# Patient Record
Sex: FEMALE | Race: WHITE | NOT HISPANIC OR LATINO | Employment: OTHER | URBAN - METROPOLITAN AREA
[De-identification: names, ages, dates, MRNs, and addresses within clinical notes are randomized per-mention and may not be internally consistent; named-entity substitution may affect disease eponyms.]

---

## 2017-01-10 DIAGNOSIS — E78.5 HYPERLIPIDEMIA LDL GOAL <130: Primary | ICD-10-CM

## 2017-01-10 RX ORDER — PRAVASTATIN SODIUM 80 MG/1
TABLET ORAL
Qty: 90 TABLET | Refills: 0 | Status: SHIPPED | OUTPATIENT
Start: 2017-01-10 | End: 2017-01-18

## 2017-01-10 NOTE — TELEPHONE ENCOUNTER
Prescription approved per [facility] Refill Protocol.  Medication is being filled for 1 time refill only due to:  Patient needs labs lipids.

## 2017-01-10 NOTE — TELEPHONE ENCOUNTER
pravastatin (PRAVACHOL) 80 MG tablet     Last Written Prescription Date: 2/23/2016  Last Fill Quantity: 90, # refills: 2  Last Office Visit with [facility], [facility] or [facility] prescribing provider: 3/18/2016   Next 5 appointments (look out 90 days)     Mar 20, 2017 10:00 AM   PHYSICAL with Dariel Weber MD   [facility] ([facility])    [address] [place] [address] [id number] [phone]                   CHOL      189   1/7/2016  HDL       54   1/7/2016  LDL      111   1/7/2016  TRIG      121   1/7/2016  CHOLHDLRATIO      3.0   2/23/2015

## 2017-01-16 DIAGNOSIS — E78.5 HYPERLIPIDEMIA LDL GOAL <130: ICD-10-CM

## 2017-01-16 LAB
ALBUMIN SERPL-MCNC: 3.6 G/DL (ref 3.4–5)
ALP SERPL-CCNC: 76 U/L (ref 40–150)
ALT SERPL W P-5'-P-CCNC: 16 U/L (ref 0–50)
ANION GAP SERPL CALCULATED.3IONS-SCNC: 5 MMOL/L (ref 3–14)
AST SERPL W P-5'-P-CCNC: 10 U/L (ref 0–45)
BILIRUB SERPL-MCNC: 0.5 MG/DL (ref 0.2–1.3)
BUN SERPL-MCNC: 17 MG/DL (ref 7–30)
CALCIUM SERPL-MCNC: 8.8 MG/DL (ref 8.5–10.1)
CHLORIDE SERPL-SCNC: 106 MMOL/L (ref 94–109)
CHOLEST SERPL-MCNC: 174 MG/DL
CO2 SERPL-SCNC: 30 MMOL/L (ref 20–32)
CREAT SERPL-MCNC: 0.82 MG/DL (ref 0.52–1.04)
GFR SERPL CREATININE-BSD FRML MDRD: 68 ML/MIN/1.7M2
GLUCOSE SERPL-MCNC: 99 MG/DL (ref 70–99)
HDLC SERPL-MCNC: 54 MG/DL
LDLC SERPL CALC-MCNC: 104 MG/DL
NONHDLC SERPL-MCNC: 120 MG/DL
POTASSIUM SERPL-SCNC: 4.4 MMOL/L (ref 3.4–5.3)
PROT SERPL-MCNC: 7.3 G/DL (ref 6.8–8.8)
SODIUM SERPL-SCNC: 141 MMOL/L (ref 133–144)
TRIGL SERPL-MCNC: 81 MG/DL

## 2017-01-16 PROCEDURE — 80053 COMPREHEN METABOLIC PANEL: CPT | Performed by: INTERNAL MEDICINE

## 2017-01-16 PROCEDURE — 80061 LIPID PANEL: CPT | Performed by: INTERNAL MEDICINE

## 2017-01-16 PROCEDURE — 36415 COLL VENOUS BLD VENIPUNCTURE: CPT | Performed by: INTERNAL MEDICINE

## 2017-01-18 ENCOUNTER — TELEPHONE (OUTPATIENT)
Dept: INTERNAL MEDICINE | Facility: CLINIC | Age: 77
End: 2017-01-18

## 2017-01-18 DIAGNOSIS — E78.5 HYPERLIPIDEMIA LDL GOAL <130: Primary | ICD-10-CM

## 2017-01-18 RX ORDER — PRAVASTATIN SODIUM 80 MG/1
TABLET ORAL
Qty: 90 TABLET | Refills: 3 | Status: SHIPPED | OUTPATIENT
Start: 2017-01-18 | End: 2018-03-22

## 2017-01-18 RX ORDER — CLOPIDOGREL BISULFATE 75 MG/1
75 TABLET ORAL DAILY
Qty: 90 TABLET | Refills: 3 | Status: SHIPPED | OUTPATIENT
Start: 2017-01-18 | End: 2018-03-22

## 2017-01-18 NOTE — TELEPHONE ENCOUNTER
Reason for Call:  Request for results:    Name of test or procedure: cholesterol     Date of test of procedure: 1/16    Location of the test or procedure: [facility]      OK to leave the result message on voice mail or with a family member? no    Phone number Patient can be reached at:  Other phone number:  [phone]    Additional comments: please call asap with the results. She is waiting for a prescription depending on the results.    Call taken on 1/18/2017 at 3:16 PM by Jenna Eng

## 2017-01-18 NOTE — TELEPHONE ENCOUNTER
Labs done 1/16. Letter done that day and was mailed to pt yesterday. RF of Pravastatin was done 1/10 for 90 days and  additional refills for a year have been sent to her pharmacy. Inform pt

## 2017-01-30 ENCOUNTER — TELEPHONE (OUTPATIENT)
Dept: INTERNAL MEDICINE | Facility: CLINIC | Age: 77
End: 2017-01-30

## 2017-01-30 NOTE — TELEPHONE ENCOUNTER
Reason for Call:  Medication or medication refill: Original refill was sent to the wrong pharmacy.     Do you use a [facility]?  Name of the pharmacy and phone number for the current request:  [address], [phone]    Name of the medication requested: pravastatin     Other request: None    Can we leave a detailed message on this number? YES    Phone number patient can be reached at: Home number on file [phone] (home)    Best Time: Any time    Call taken on 1/30/2017 at 4:20 PM by Marisol Bazzi

## 2017-02-07 ENCOUNTER — TRANSFERRED RECORDS (OUTPATIENT)
Dept: HEALTH INFORMATION MANAGEMENT | Facility: CLINIC | Age: 77
End: 2017-02-07

## 2017-03-20 ENCOUNTER — OFFICE VISIT (OUTPATIENT)
Dept: INTERNAL MEDICINE | Facility: CLINIC | Age: 77
End: 2017-03-20
Payer: COMMERCIAL

## 2017-03-20 ENCOUNTER — RADIANT APPOINTMENT (OUTPATIENT)
Dept: GENERAL RADIOLOGY | Facility: CLINIC | Age: 77
End: 2017-03-20
Attending: INTERNAL MEDICINE
Payer: COMMERCIAL

## 2017-03-20 VITALS
SYSTOLIC BLOOD PRESSURE: 128 MMHG | HEART RATE: 66 BPM | BODY MASS INDEX: 27 KG/M2 | HEIGHT: 67 IN | WEIGHT: 172 LBS | DIASTOLIC BLOOD PRESSURE: 72 MMHG | TEMPERATURE: 98.4 F | OXYGEN SATURATION: 98 %

## 2017-03-20 DIAGNOSIS — M54.50 CHRONIC RIGHT-SIDED LOW BACK PAIN WITHOUT SCIATICA: ICD-10-CM

## 2017-03-20 DIAGNOSIS — Z23 NEED FOR TDAP VACCINATION: ICD-10-CM

## 2017-03-20 DIAGNOSIS — G89.29 CHRONIC RIGHT-SIDED LOW BACK PAIN WITHOUT SCIATICA: ICD-10-CM

## 2017-03-20 DIAGNOSIS — L98.9 SKIN DISORDER: ICD-10-CM

## 2017-03-20 DIAGNOSIS — Z00.00 MEDICARE ANNUAL WELLNESS VISIT, SUBSEQUENT: Primary | ICD-10-CM

## 2017-03-20 PROCEDURE — G0439 PPPS, SUBSEQ VISIT: HCPCS | Performed by: INTERNAL MEDICINE

## 2017-03-20 PROCEDURE — 90471 IMMUNIZATION ADMIN: CPT | Performed by: INTERNAL MEDICINE

## 2017-03-20 PROCEDURE — 90715 TDAP VACCINE 7 YRS/> IM: CPT | Performed by: INTERNAL MEDICINE

## 2017-03-20 PROCEDURE — 72100 X-RAY EXAM L-S SPINE 2/3 VWS: CPT

## 2017-03-20 NOTE — NURSING NOTE
"Chief Complaint   Patient presents with     Medicare Visit       Initial /72  Pulse 66  Temp 98.4  F (36.9  C) (Oral)  Ht 5' 6.75\" (1.695 m)  Wt 172 lb (78 kg)  SpO2 98%  Breastfeeding? No  BMI 27.14 kg/m2 Estimated body mass index is 27.14 kg/(m^2) as calculated from the following:    Height as of this encounter: 5' 6.75\" (1.695 m).    Weight as of this encounter: 172 lb (78 kg).  Medication Reconciliation: complete    "

## 2017-03-20 NOTE — PROGRESS NOTES
SUBJECTIVE:                                                            Liana Giraldo is a 77 year old female who presents for Preventive Visit.      Are you in the first 12 months of your Medicare Part B coverage?  No    Healthy Habits:    Do you get at least three servings of calcium containing foods daily (dairy, green leafy vegetables, etc.)? yes    Amount of exercise or daily activities, outside of work: Active without specific program    Problems taking medications regularly No    Medication side effects: No    Have you had an eye exam in the past two years? yes    Do you see a dentist twice per year? yes    Do you have sleep apnea, excessive snoring or daytime drowsiness?yes    COGNITIVE SCREEN  1) Repeat 3 items (Banana, Sunrise, Chair)    2) Clock draw: NORMAL  3) 3 item recall: Recalls 3 objects  Results: 3 items recalled: COGNITIVE IMPAIRMENT LESS LIKELY    Mini-CogTM Copyright S Mo. Licensed by the author for use in [facility]; reprinted with permission ([email or web address]). All rights reserved.        Reviewed and updated as needed this visit by clinical staff  Tobacco  Allergies  Med Hx  Surg Hx  Fam Hx  Soc Hx        Reviewed and updated as needed this visit by Provider        Social History   Substance Use Topics     Smoking status: Never Smoker     Smokeless tobacco: Never Used     Alcohol use Yes      Comment: occ       The patient does not drink >3 drinks per day nor >7 drinks per week.    Today's PHQ-2 Score:   PHQ-2 ( 1999 Pf[employer]) 3/20/2017 3/18/2016   Q1: Little interest or pleasure in doing things 0 0   Q2: Feeling down, depressed or hopeless 0 0   PHQ-2 Score 0 0       Do you feel safe in your environment - Yes    Do you have a Health Care Directive?: Yes: Advance Directive has been received and scanned.    Current providers sharing in care for this patient include:   Patient Care Team:  Dariel Weber MD as PCP - General      Hearing impairment: No    Ability to successfully  perform activities of daily living: Yes, no assistance needed     Fall risk:  Fallen 2 or more times in the past year?: No  Any fall with injury in the past year?: No    Home safety:  none identified      The following health maintenance items are reviewed in Epic and correct as of today:  Health Maintenance   Topic Date Due     COLONOSCOPY Q10 YR INBASKET MESSAGE  08/23/2015     TETANUS IMMUNIZATION (SYSTEM ASSIGNED)  01/03/2016     FALL RISK ASSESSMENT  03/18/2017     LIPID MONITORING Q1 YEAR( NO INBASKET)  01/16/2018     ADVANCE DIRECTIVE PLANNING Q5 YRS (NO INBASKET)  02/20/2018     DEXA SCAN SCREENING (SYSTEM ASSIGNED)  Completed     PNEUMOCOCCAL  Completed          ROS:  C: NEGATIVE for fever, chills, change in weight  I: NEGATIVE for worrisome  moles or lesions. Has pots on back and face that wants checked out. Once spot left face  With recurrently bleeding and never heals  E: NEGATIVE for vision changes or irritation  E/M: NEGATIVE for ear, mouth and throat problems  R: NEGATIVE for significant cough or SOB  B: NEGATIVE for masses, tenderness or discharge  CV: NEGATIVE for chest pain, palpitations or peripheral edema  GI: NEGATIVE for nausea, abdominal pain, heartburn, or change in bowel habits  : NEGATIVE for frequency, dysuria, or hematuria  M: NEGATIVE for significant arthralgias or myalgia that limit activity.  Occ soreness and  stiffness in low back with prolonged walking. Sees chiro occ. No Xrays done. No radicular sx into LEs  N: NEGATIVE for weakness, dizziness. Occ tingle in hands bilaterally. Better with movement. No sx now  E: NEGATIVE for temperature intolerance. On statin therapy  H: NEGATIVE for bleeding problems  P: NEGATIVE for changes in mood or affect.     Problem list, Medication list, Allergies, and Medical/Social/Surgical histories reviewed in [facility] and updated as appropriate.  OBJECTIVE:                                                            /72  Pulse 66  Temp 98.4  F (36.9  " C) (Oral)  Ht 5' 6.75\" (1.695 m)  Wt 172 lb (78 kg)  SpO2 98%  Breastfeeding? No  BMI 27.14 kg/m2 Estimated body mass index is 27.14 kg/(m^2) as calculated from the following:    Height as of this encounter: 5' 6.75\" (1.695 m).    Weight as of this encounter: 172 lb (78 kg).  EXAM:   General appearance - healthy, alert, no distress  Skin -   Multiple 2-3mm plugged pore bumps on forehead and  2mm slight raised irreg border  Skin lesion left medial face  with scab. Few large seborrheic keratosis on back.  Forehead at hairline shoes 1cm serous crusty skin change. Not removed  Head - normocephalic, atraumatic  Eyes - RAFI, EOMI, fundi exam with nondilated pupils negative.  Ears - External ears normal. Canals clear. TM's normal.  Nose/Sinuses - Nares normal. Septum midline. Mucosa normal. No drainage or sinus tenderness.  Oropharynx - No erythema, no adenopathy, no exudates.  Neck - Supple without adenopathy or thyromegaly. No bruits.  Lungs - Clear to auscultation without wheezes/rhonchi.  Heart - Regular rate and rhythm without murmurs, clicks, or gallops.  Nodes - No supraclavicular, axillary, or inguinal adenopathy palpable.  Breasts - deferred  Abdomen - Abdomen soft, non-tender. BS normal. No masses or hepatosplenomegaly palpable. No bruits.  Extremities -No cyanosis, clubbing or edema.    Musculoskeletal - Spine ROM normal. Muscular strength intact.   Peripheral pulses - radial=4/4, femoral=4/4, posterior tibial=4/4, dorsalis pedis=4/4,  Neuro - Gait normal. Reflexes normal and symmetric. Sensation grossly WNL.  Genital/Rectal - deferred      ASSESSMENT / PLAN:                                                            1. Medicare annual wellness visit, subsequent  HCM UTD except for TDAP as below and need for future mammogram, DEXA. See plan discusison below    2. Skin disorder  referral to dermatology for further skin eval and possible biopsy left facial cheek nonhealing lesion  - DERMATOLOGY REFERRAL    3. " "Chronic right-sided low back pain without sciatica  Xray ordered as below. Possible PT referral. Will await results  - XR Lumbar Spine 2/3 Views; Future    4. Need for Tdap vaccination  - TDAP (ADACEL AGES 11-64)      End of Life Planning:  Patient currently has an advanced directive: Yes.  Practitioner is supportive of decision.    COUNSELING:  Reviewed preventive health counseling, as reflected in patient instructions  Special attention given to:       discussion of backand skin issues        Estimated body mass index is 27.14 kg/(m^2) as calculated from the following:    Height as of this encounter: 5' 6.75\" (1.695 m).    Weight as of this encounter: 172 lb (78 kg).     reports that she has never smoked. She has never used smokeless tobacco.      Appropriate preventive services were discussed with this patient, including applicable screening as appropriate for cardiovascular disease, diabetes, osteopenia/osteoporosis, and glaucoma.  As appropriate for age/gender, discussed screening for colorectal cancer, prostate cancer, breast cancer, and cervical cancer. Checklist reviewing preventive services available has been given to the patient.    Reviewed patients plan of care and provided an AVS. The Basic Care Plan (routine screening as documented in Health Maintenance) for Liana meets the Care Plan requirement. This Care Plan has been established and reviewed with the Patient.    Counseling Resources:  ATP IV Guidelines  Pooled Cohorts Equation Calculator  Breast Cancer Risk Calculator  FRAX Risk Assessment  ICSI Preventive Guidelines  Dietary Guidelines for Americans, 2010  USDA's MyPlate  ASA Prophylaxis  Lung CA Screening      PLAN:  TDAP vaccine  Lumbar  Spine Xray  Bone density at  a [facility] due to [facility] remodeling or wait until later this year when due for mammogram in November to have done at [facility] and have both ayaan same day  [facility] [phone] ([address], [address])  [facility] [phone] ([address] [facility] [address])  Continue current meds  Referral to Dermatology. Call for appt              Dariel Weber MD  [facility]

## 2017-03-20 NOTE — PATIENT INSTRUCTIONS
TDAP vaccine  Lumbar  Spine Xray  Bone density at  a [facility] due to [facility] remodeling or wait until later this year when due for mammogram in November to have done at [facility] and have both dome same day  [facility] [phone] ([address], [address])  [facility] [phone] ([address] [facility] [address])  Continue current meds  Referral to Dermatology. Call for appt

## 2017-03-20 NOTE — MR AVS SNAPSHOT
After Visit Summary   3/20/2017    Liana Giraldo    MRN: [id number]           Patient Information     Date Of Birth          1940        Visit Information        Provider Department      3/20/2017 10:00 AM Dariel Weber MD [facility]        Today's Diagnoses     Medicare annual wellness visit, subsequent    -  1    Need for prophylactic vaccination with tetanus-diphtheria (TD)        Skin disorder        Chronic right-sided low back pain without sciatica        Need for Tdap vaccination          Care Instructions    TDAP vaccine  Lumbar  Spine Xray  [facility]  [facility] or wait until later this year when due for mammogram in November to have done at [facility] and have both dome same day  [facility] [phone] ([address], [address])  [facility] [phone] ([address] [facility] [address])  Continue current meds  Referral to Dermatology. Call for appt        Follow-ups after your visit        Additional Services     DERMATOLOGY REFERRAL       Your provider has referred you to: [facility]: [facility] Dermatology [facility] [phone]   [email or web address]    Please be aware that coverage of these services is subject to the terms and limitations of your health insurance plan.  Call member services at your health plan with any benefit or coverage questions.      Please bring the following with you to your appointment:    (1) Any X-Rays, CTs or MRIs which have been performed.  Contact the facility where they were done to arrange for  prior to your scheduled appointment.    (2) List of current medications  (3) This referral request   (4) Any documents/labs given to you for this referral                  Future tests that were ordered for you today     Open Future Orders        Priority Expected Expires Ordered    XR Lumbar Spine 2/3 Views  "Routine 3/20/2017 3/20/2018 3/20/2017            Who to contact     If you have questions or need follow up information about today's clinic visit or your schedule please contact [facility] directly at [phone].  Normal or non-critical lab and imaging results will be communicated to you by MyChart, letter or phone within 4 business days after the clinic has received the results. If you do not hear from us within 7 days, please contact the clinic through MyChart or phone. If you have a critical or abnormal lab result, we will notify you by phone as soon as possible.  Submit refill requests through [employer] or call your pharmacy and they will forward the refill request to us. Please allow 3 business days for your refill to be completed.          Additional Information About Your Visit        MyChart Information     [employer] lets you send messages to your doctor, view your test results, renew your prescriptions, schedule appointments and more. To sign up, go to [email or web address] . Click on \"Log in\" on the left side of the screen, which will take you to the Welcome page. Then click on \"Sign up Now\" on the right side of the page.     You will be asked to enter the access code listed below, as well as some personal information. Please follow the directions to create your username and password.     Your access code is: [email or web address]  Expires: 2017 10:47 AM     Your access code will  in 90 days. If you need help or a new code, please call your [facility] or [phone].        Care EveryWhere ID     This is your Care EveryWhere ID. This could be used by other organizations to access your [place] medical records  [id number]        Your Vitals Were     Pulse Temperature Height Pulse Oximetry Breastfeeding? BMI (Body Mass Index)    66 98.4  F (36.9  C) (Oral) 5' 6.75\" (1.695 m) 98% No 27.14 kg/m2       Blood Pressure from Last 3 Encounters:   17 128/72   16 116/62 "   02/25/16 122/72    Weight from Last 3 Encounters:   03/20/17 172 lb (78 kg)   03/18/16 171 lb (77.6 kg)   01/07/16 171 lb (77.6 kg)              We Performed the Following     DERMATOLOGY REFERRAL     TDAP (ADACEL AGES 11-64)        Primary Care Provider Office Phone # Fax #    Dariel Weber [phone]       [facility] [address] [facility] [address]        Thank you!     Thank you for choosing [facility]  for your care. Our goal is always to provide you with excellent care. Hearing back from our patients is one way we can continue to improve our services. Please take a few minutes to complete the written survey that you may receive in the mail after your visit with us. Thank you!             Your Updated Medication List - Protect others around you: Learn how to safely use, store and throw away your medicines at www.disposemymeds.org.          This list is accurate as of: 3/20/17 10:47 AM.  Always use your most recent med list.                   Brand Name Dispense Instructions for use    calcium + D 600-200 MG-UNIT Tabs   Generic drug:  calcium carbonate-vitamin D      1 TABLET DAILY       clopidogrel 75 MG tablet    PLAVIX    90 tablet    Take 1 tablet (75 mg) by mouth daily       clorazepate 3.75 MG tablet    TRANXENE    30 tablet    Take 1 tablet by mouth. Daily as needed for severe anxiety       MULTIVITAMIN TABS   OR      1 tablet daily       pravastatin 80 MG tablet    PRAVACHOL    90 tablet    Take 1 tablet by mouth. at bedtime.       vitamin D 2000 UNITS tablet     200 tablet    2 tabs (total 4,000 IU) daily

## 2017-03-20 NOTE — NURSING NOTE
Screening Questionnaire for Adult Immunization    Are you sick today?   No   Do you have allergies to medications, food, a vaccine component or latex?   Yes   Have you ever had a serious reaction after receiving a vaccination?   No   Do you have a long-term health problem with heart disease, lung disease, asthma, kidney disease, metabolic disease (e.g. diabetes), anemia, or other blood disorder?   No   Do you have cancer, leukemia, HIV/AIDS, or any other immune system problem?   No   In the past 3 months, have you taken medications that affect  your immune system, such as prednisone, other steroids, or anticancer drugs; drugs for the treatment of rheumatoid arthritis, Crohn s disease, or psoriasis; or have you had radiation treatments?   No   Have you had a seizure, or a brain or other nervous system problem?   No   During the past year, have you received a transfusion of blood or blood     products, or been given immune (gamma) globulin or antiviral drug?   No   For women: Are you pregnant or is there a chance you could become        pregnant during the next month?   No   Have you received any vaccinations in the past 4 weeks?   No     Immunization questionnaire was positive for at least one answer.  Notified PMD.      MNVFC doesn't apply on this patient    Per orders of Dr. Weber, injection of TDAP given by Adriana Sherman. Patient instructed to remain in clinic for 20 minutes afterwards, and to report any adverse reaction to me immediately.       Screening performed by Adriana Sherman on 3/20/2017 at 10:59 AM.

## 2017-03-24 ENCOUNTER — TELEPHONE (OUTPATIENT)
Dept: INTERNAL MEDICINE | Facility: CLINIC | Age: 77
End: 2017-03-24

## 2017-03-24 NOTE — TELEPHONE ENCOUNTER
Reason for Call:  Other results - wants copy of report mailed    Detailed comments: the patient had an X-Ray on 03.20.17 @ [facility], and she already did receive the results from Dr Weber, but she would like to have a copy of the report mailed to her attention    Phone Number Patient can be reached at: Home number on file [phone] (home)    Best Time: anytime    Can we leave a detailed message on this number? YES    Call taken on 3/24/2017 at 9:04 AM by Jacqueline Arguelles

## 2017-04-20 ENCOUNTER — OFFICE VISIT (OUTPATIENT)
Dept: DERMATOLOGY | Facility: CLINIC | Age: 77
End: 2017-04-20
Payer: COMMERCIAL

## 2017-04-20 VITALS — HEART RATE: 67 BPM | SYSTOLIC BLOOD PRESSURE: 112 MMHG | DIASTOLIC BLOOD PRESSURE: 71 MMHG | OXYGEN SATURATION: 95 %

## 2017-04-20 DIAGNOSIS — L82.1 SK (SEBORRHEIC KERATOSIS): Primary | ICD-10-CM

## 2017-04-20 DIAGNOSIS — L57.0 AK (ACTINIC KERATOSIS): ICD-10-CM

## 2017-04-20 DIAGNOSIS — L73.8 SENILE SEBACEOUS GLAND HYPERPLASIA: ICD-10-CM

## 2017-04-20 DIAGNOSIS — L81.4 LENTIGO: ICD-10-CM

## 2017-04-20 PROCEDURE — 11100 HC BIOPSY SKIN/SUBQ/MUC MEM, SINGLE LESION: CPT | Performed by: DERMATOLOGY

## 2017-04-20 PROCEDURE — 88331 PATH CONSLTJ SURG 1 BLK 1SPC: CPT | Performed by: DERMATOLOGY

## 2017-04-20 PROCEDURE — 99203 OFFICE O/P NEW LOW 30 MIN: CPT | Mod: 25 | Performed by: DERMATOLOGY

## 2017-04-20 NOTE — PROGRESS NOTES
Liana Giraldo is a 77 year old year old female patient here today in consultation for spots on face by Dr. Weber.  She notes bleeding area on left cheek and spots on face for years.  Location face and neck .  Patient reports the following symptoms:  Bleeding on cheek .  Patient reports the following previous treatments none.  Patient reports the following modifying factors none.  Associated symptoms: none.  Patient has no other skin complaints today.  Remainder of the HPI, Meds, PMH, Allergies, FH, and SH was reviewed in chart.      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     Osteopenia      Other and unspecified hyperlipidemia      Personal history of colonic polyps 8/05    Hyperplastic     Rotator cuff (capsule) sprain 12/01     right with 3mm supraspinatous tendon tear     Synovial cyst of popliteal space 7/30/03    right     TIA (transient ischemic attack) 2/25/16     Vitamin D deficiency      Vitamin D deficiency disease 2/23/2013       Past Surgical History:   Procedure Laterality Date     C NONSPECIFIC PROCEDURE      appy        Family History   Problem Relation Age of Onset     Cardiovascular Mother      MI     Cardiovascular Father      Aneurysm     Breast Cancer Sister      C.A.D. Brother      Brother       Social History     Social History     Marital status:      Spouse name: N/A     Number of children: N/A     Years of education: N/A     Occupational History     Not on file.     Social History Main Topics     Smoking status: Never Smoker     Smokeless tobacco: Never Used     Alcohol use Yes      Comment: occ     Drug use: No     Sexual activity: Yes     Partners: Male     Other Topics Concern     Not on file     Social History Narrative       Outpatient Encounter Prescriptions as of 4/20/2017   Medication Sig Dispense Refill      pravastatin (PRAVACHOL) 80 MG tablet Take 1 tablet by mouth. at bedtime. 90 tablet 3     clopidogrel (PLAVIX) 75 MG tablet Take 1 tablet (75 mg) by mouth daily 90 tablet 3     Cholecalciferol (VITAMIN D) 2000 UNITS tablet 2 tabs (total 4,000 IU) daily 200 tablet 3     clorazepate (TRANXENE) 3.75 MG tablet Take 1 tablet by mouth. Daily as needed for severe anxiety 30 tablet 1     MULTIVITAMIN TABS   OR 1 tablet daily       CALCIUM + D 600-200 MG-IU OR TABS 1 TABLET DAILY       No facility-administered encounter medications on file as of 4/20/2017.              Review Of Systems  Skin: As above  Eyes: negative  Ears/Nose/Throat: negative  Respiratory: No shortness of breath, dyspnea on exertion, cough, or hemoptysis  Cardiovascular: negative  Gastrointestinal: negative  Genitourinary: negative  Musculoskeletal: negative  Neurologic: negative  Psychiatric: negative  Hematologic/Lymphatic/Immunologic: negative  Endocrine: negative      O:   NAD, WDWN, Alert & Oriented, Mood & Affect wnl, Vitals stable   Here today alone   /71  Pulse 67  SpO2 95%   General appearance kannan ii   Vitals stable   Alert, oriented and in no acute distress      Following lymph nodes palpated: Occipital, Cervical, Supraclavicular no lad   L medial cheek 7mm bleeding scaly papule    Stuck on papules and brown macules on trunk and ext    Yellow papules on face        The remainder of expanded problem focused exam was unremarkable; the following areas were examined:  scalp/hair, conjunctiva/lids, face, neck, lips/teeth, chest, digits/nails, RUE, LUE.      Eyes: Conjunctivae/lids:Normal     ENT: Lips, buccal mucosa, tongue: normal    MSK:Normal    Cardiovascular: peripheral edema none    Pulm: Breathing Normal    Lymph Nodes: No Head and Neck Lymphadenopathy     Neuro/Psych: Orientation:Normal; Mood/Affect:Normal      MICRO:   L medial cheek :There is hyperkeratosis and focal parakeratosis of the epidermis, overlying atypical keratinocytes,   by areas of orthokeratosis, there are scattered basal atypical keratinocytes: with varying degrees of overlying loss of maturation, hyperchromatism, pleomorphism, increased and abnormal mitoses, dyskeratosis.  The dermis shows a variable inflammatory infiltrate.   A/P:  1. L medial cheek  TANGENTIAL BIOPSY IN HOUSE:  After consent, anesthesia with LEC and prep, tangential excision performed and dx above confirmed with frozen section histology.  No complications and routine wound care.  Patient told result actinic keratosis schedule cryo .      2. Seborrheic keratosis, lentigo,sh  Fractional CO2 laser discussed with patient   Referral to [facility] clinic  BENIGN LESIONS DISCUSSED WITH PATIENT:  I discussed the specifics of tumor, prognosis, and genetics of benign lesions.  I explained that treatment of these lesions would be purely cosmetic and not medically neccessary.  I discussed with patient different removal options including excision, cautery and /or laser.      Nature and genetics of benign skin lesions dicussed with patient.  Signs and Symptoms of skin cancer discussed with patient.  Patient encouraged to perform monthly skin exams.  UV precautions reviewed with patient.  Skin care regimen reviewed with patient: Eliminate harsh soaps, i.e. Dial, zest, irsih spring; Mild soaps such as Cetaphil or Dove sensitive skin, avoid hot or cold showers, aggressive use of emollients including vanicream, cetaphil or cerave discussed with patient.    Risks of non-melanoma skin cancer discussed with patient   Return to clinic 6 mnths

## 2017-04-20 NOTE — NURSING NOTE
"Initial /71  Pulse 67  SpO2 95% Estimated body mass index is 27.14 kg/(m^2) as calculated from the following:    Height as of 3/20/17: 1.695 m (5' 6.75\").    Weight as of 3/20/17: 78 kg (172 lb). .      "

## 2017-04-20 NOTE — PATIENT INSTRUCTIONS
Wound Care Instructions     FOR SUPERFICIAL WOUNDS     [facility] [phone]    [facility] [phone]                       AFTER 24 HOURS YOU SHOULD REMOVE THE BANDAGE AND BEGIN DAILY DRESSING CHANGES AS FOLLOWS:     1) Remove Dressing.     2) Clean and dry the area with tap water using a Q-tip or sterile gauze pad.     3) Apply Vaseline, Aquaphor, Polysporin ointment or Bacitracin ointment over entire wound.  Do NOT use Neosporin ointment.     4) Cover the wound with a band-aid, or a sterile non-stick gauze pad and micropore paper tape      REPEAT THESE INSTRUCTIONS AT LEAST ONCE A DAY UNTIL THE WOUND HAS COMPLETELY HEALED.    It is an old wives tale that a wound heals better when it is exposed to air and allowed to dry out. The wound will heal faster with a better cosmetic result if it is kept moist with ointment and covered with a bandage.    **Do not let the wound dry out.**      Supplies Needed:      *Cotton tipped applicators (Q-tips)    *Polysporin Ointment or Bacitracin Ointment (NOT NEOSPORIN)    *Band-aids or non-stick gauze pads and micropore paper tape.      PATIENT INFORMATION:    During the healing process you will notice a number of changes. All wounds develop a small halo of redness surrounding the wound.  This means healing is occurring. Severe itching with extensive redness usually indicates sensitivity to the ointment or bandage tape used to dress the wound.  You should call our office if this develops.      Swelling  and/or discoloration around your surgical site is common, particularly when performed around the eye.    All wounds normally drain.  The larger the wound the more drainage there will be.  After 7-10 days, you will notice the wound beginning to shrink and new skin will begin to grow.  The wound is healed when you can see skin has formed over the entire area.  A healed wound has a healthy, shiny look to the surface and is red to dark pink in color  to normalize.  Wounds may take approximately 4-6 weeks to heal.  Larger wounds may take 6-8 weeks.  After the wound is healed you may discontinue dressing changes.    You may experience a sensation of tightness as your wound heals. This is normal and will gradually subside.    Your healed wound may be sensitive to temperature changes. This sensitivity improves with time, but if you re having a lot of discomfort, try to avoid temperature extremes.    Patients frequently experience itching after their wound appears to have healed because of the continue healing under the skin.  Plain Vaseline will help relieve the itching.        POSSIBLE COMPLICATIONS    BLEEDIN. Leave the bandage in place.  2. Use tightly rolled up gauze or a cloth to apply direct pressure over the bandage for 30  minutes.  3. Reapply pressure for an additional 30 minutes if necessary  4. Use additional gauze and tape to maintain pressure once the bleeding has stopped.

## 2017-04-20 NOTE — MR AVS SNAPSHOT
After Visit Summary   4/20/2017    Liana Giraldo    MRN: [id number]           Patient Information     Date Of Birth          1940        Visit Information        Provider Department      4/20/2017 10:30 AM Edilberto Rosales MD [facility]        Care Instructions          Wound Care Instructions     FOR SUPERFICIAL WOUNDS     [facility] [phone]    [facility] [phone]                       AFTER 24 HOURS YOU SHOULD REMOVE THE BANDAGE AND BEGIN DAILY DRESSING CHANGES AS FOLLOWS:     1) Remove Dressing.     2) Clean and dry the area with tap water using a Q-tip or sterile gauze pad.     3) Apply Vaseline, Aquaphor, Polysporin ointment or Bacitracin ointment over entire wound.  Do NOT use Neosporin ointment.     4) Cover the wound with a band-aid, or a sterile non-stick gauze pad and micropore paper tape      REPEAT THESE INSTRUCTIONS AT LEAST ONCE A DAY UNTIL THE WOUND HAS COMPLETELY HEALED.    It is an old wives tale that a wound heals better when it is exposed to air and allowed to dry out. The wound will heal faster with a better cosmetic result if it is kept moist with ointment and covered with a bandage.    **Do not let the wound dry out.**      Supplies Needed:      *Cotton tipped applicators (Q-tips)    *Polysporin Ointment or Bacitracin Ointment (NOT NEOSPORIN)    *Band-aids or non-stick gauze pads and micropore paper tape.      PATIENT INFORMATION:    During the healing process you will notice a number of changes. All wounds develop a small halo of redness surrounding the wound.  This means healing is occurring. Severe itching with extensive redness usually indicates sensitivity to the ointment or bandage tape used to dress the wound.  You should call our office if this develops.      Swelling  and/or discoloration around your surgical site is common, particularly when performed around the eye.    All wounds normally drain.   The larger the wound the more drainage there will be.  After 7-10 days, you will notice the wound beginning to shrink and new skin will begin to grow.  The wound is healed when you can see skin has formed over the entire area.  A healed wound has a healthy, shiny look to the surface and is red to dark pink in color to normalize.  Wounds may take approximately 4-6 weeks to heal.  Larger wounds may take 6-8 weeks.  After the wound is healed you may discontinue dressing changes.    You may experience a sensation of tightness as your wound heals. This is normal and will gradually subside.    Your healed wound may be sensitive to temperature changes. This sensitivity improves with time, but if you re having a lot of discomfort, try to avoid temperature extremes.    Patients frequently experience itching after their wound appears to have healed because of the continue healing under the skin.  Plain Vaseline will help relieve the itching.        POSSIBLE COMPLICATIONS    BLEEDIN. Leave the bandage in place.  2. Use tightly rolled up gauze or a cloth to apply direct pressure over the bandage for 30  minutes.  3. Reapply pressure for an additional 30 minutes if necessary  4. Use additional gauze and tape to maintain pressure once the bleeding has stopped.          Follow-ups after your visit        Who to contact     If you have questions or need follow up information about today's clinic visit or your schedule please contact [facility] directly at [phone].  Normal or non-critical lab and imaging results will be communicated to you by [employer], letter or phone within 4 business days after the clinic has received the results. If you do not hear from us within 7 days, please contact the clinic through [employer] or phone. If you have a critical or abnormal lab result, we will notify you by phone as soon as possible.  Submit refill requests through [employer] or call your pharmacy and they will  "forward the refill request to us. Please allow 3 business days for your refill to be completed.          Additional Information About Your Visit        [employer] Information     [employer] lets you send messages to your doctor, view your test results, renew your prescriptions, schedule appointments and more. To sign up, go to [email or web address] . Click on \"Log in\" on the left side of the screen, which will take you to the Welcome page. Then click on \"Sign up Now\" on the right side of the page.     You will be asked to enter the access code listed below, as well as some personal information. Please follow the directions to create your username and password.     Your access code is: [email or web address]  Expires: 2017 10:47 AM     Your access code will  in 90 days. If you need help or a new code, please call your [place] clinic or [phone].        Care EveryWhere ID     This is your Care EveryWhere ID. This could be used by other organizations to access your [place] medical records  [id number]        Your Vitals Were     Pulse Pulse Oximetry                67 95%           Blood Pressure from Last 3 Encounters:   17 112/71   17 128/72   16 116/62    Weight from Last 3 Encounters:   17 78 kg (172 lb)   16 77.6 kg (171 lb)   16 77.6 kg (171 lb)              Today, you had the following     No orders found for display       Primary Care Provider Office Phone # Fax #    Dariel Weber [phone]       [facility] [address] [facility] [address]        Thank you!     Thank you for choosing [facility]  for your care. Our goal is always to provide you with excellent care. Hearing back from our patients is one way we can continue to improve our services. Please take a few minutes to complete the written survey that you may receive in the mail after your visit with us. Thank you!             Your Updated Medication List - " Protect others around you: Learn how to safely use, store and throw away your medicines at www.disposemymeds.org.          This list is accurate as of: 4/20/17 11:02 AM.  Always use your most recent med list.                   Brand Name Dispense Instructions for use    calcium + D 600-200 MG-UNIT Tabs   Generic drug:  calcium carbonate-vitamin D      1 TABLET DAILY       clopidogrel 75 MG tablet    PLAVIX    90 tablet    Take 1 tablet (75 mg) by mouth daily       clorazepate 3.75 MG tablet    TRANXENE    30 tablet    Take 1 tablet by mouth. Daily as needed for severe anxiety       MULTIVITAMIN TABS   OR      1 tablet daily       pravastatin 80 MG tablet    PRAVACHOL    90 tablet    Take 1 tablet by mouth. at bedtime.       vitamin D 2000 UNITS tablet     200 tablet    2 tabs (total 4,000 IU) daily

## 2017-04-21 ENCOUNTER — TELEPHONE (OUTPATIENT)
Dept: DERMATOLOGY | Facility: CLINIC | Age: 77
End: 2017-04-21

## 2017-04-21 NOTE — TELEPHONE ENCOUNTER
----- Message from Edilberto Rosales MD sent at 4/20/2017 11:43 AM CDT -----  L cheek actinic keratosis schedule cryo

## 2017-05-08 ENCOUNTER — TELEPHONE (OUTPATIENT)
Dept: DERMATOLOGY | Facility: CLINIC | Age: 77
End: 2017-05-08

## 2017-05-08 NOTE — TELEPHONE ENCOUNTER
Reason for Call:  Other call back    Detailed comments: The patient gave no details, other than to have Dr. Rosales's office call her back.     Phone Number Patient can be reached at: Home number on file [phone] (home)    Best Time: Any time    Can we leave a detailed message on this number? NO    Call taken on 5/8/2017 at 1:46 PM by Marisol Bazzi

## 2017-07-10 DIAGNOSIS — F41.9 ANXIETY: Primary | ICD-10-CM

## 2017-07-10 NOTE — TELEPHONE ENCOUNTER
clorazepate (TRANXENE) 3.75 MG tablet      Last Written Prescription Date: 08/16/2011  Last Fill Quantity: 30,  # refills: 1   Last Office Visit with [facility], [facility] or [facility] prescribing provider: 03/20/2017

## 2017-07-12 RX ORDER — CLORAZEPATE DIPOTASSIUM 3.75 MG/1
TABLET ORAL
Qty: 30 TABLET | Refills: 1
Start: 2017-07-12 | End: 2017-07-19

## 2017-07-14 ENCOUNTER — MYC MEDICAL ADVICE (OUTPATIENT)
Dept: INTERNAL MEDICINE | Facility: CLINIC | Age: 77
End: 2017-07-14

## 2017-07-17 ENCOUNTER — RADIANT APPOINTMENT (OUTPATIENT)
Dept: MAMMOGRAPHY | Facility: CLINIC | Age: 77
End: 2017-07-17
Attending: INTERNAL MEDICINE
Payer: COMMERCIAL

## 2017-07-17 DIAGNOSIS — Z12.31 VISIT FOR SCREENING MAMMOGRAM: ICD-10-CM

## 2017-07-17 PROCEDURE — G0202 SCR MAMMO BI INCL CAD: HCPCS | Mod: TC

## 2017-07-17 PROCEDURE — 77063 BREAST TOMOSYNTHESIS BI: CPT | Mod: TC

## 2017-07-18 ENCOUNTER — MYC MEDICAL ADVICE (OUTPATIENT)
Dept: INTERNAL MEDICINE | Facility: CLINIC | Age: 77
End: 2017-07-18

## 2017-07-18 DIAGNOSIS — F41.9 ANXIETY: Primary | ICD-10-CM

## 2017-07-19 RX ORDER — LORAZEPAM 0.5 MG/1
TABLET ORAL
Qty: 30 TABLET | Refills: 0
Start: 2017-07-19 | End: 2019-12-02

## 2017-11-29 ENCOUNTER — TELEPHONE (OUTPATIENT)
Dept: INTERNAL MEDICINE | Facility: CLINIC | Age: 77
End: 2017-11-29

## 2017-11-29 DIAGNOSIS — Z12.31 ENCOUNTER FOR SCREENING MAMMOGRAM FOR BREAST CANCER: Primary | ICD-10-CM

## 2018-01-08 ENCOUNTER — OFFICE VISIT (OUTPATIENT)
Dept: PODIATRY | Facility: CLINIC | Age: 78
End: 2018-01-08
Payer: COMMERCIAL

## 2018-01-08 VITALS
DIASTOLIC BLOOD PRESSURE: 70 MMHG | SYSTOLIC BLOOD PRESSURE: 118 MMHG | WEIGHT: 176.5 LBS | BODY MASS INDEX: 27.7 KG/M2 | HEIGHT: 67 IN

## 2018-01-08 DIAGNOSIS — M20.11 HALLUX VALGUS OF RIGHT FOOT: ICD-10-CM

## 2018-01-08 DIAGNOSIS — M21.6X1 PRONATION OF BOTH FEET: ICD-10-CM

## 2018-01-08 DIAGNOSIS — M21.6X2 PRONATION OF BOTH FEET: ICD-10-CM

## 2018-01-08 DIAGNOSIS — M72.2 PLANTAR FASCIAL FIBROMATOSIS: Primary | ICD-10-CM

## 2018-01-08 PROCEDURE — 99203 OFFICE O/P NEW LOW 30 MIN: CPT | Performed by: PODIATRIST

## 2018-01-08 NOTE — PATIENT INSTRUCTIONS
Heel Pain Instructions:    1)  A rigid-soled, athletic shoe like a hiking shoe is recommended.    2)  Avoid barefoot walking in your home.  It is a good idea to wear a clean athletic shoe inside.    3)  Stretch your calf musculature and plantar fascia frequently.  It is a good idea to do this before getting out of bed.    4)  Ice and ibuprofen can help if pain is related to inflammation - more likely early on in the process    5)  Some good over the counter inserts might help:  Spenco, Super Feet, others. These are found at [employer], and other [employer]    If pain persists, please return to clinic.  Future options include a walking boot, physical therapy, night splints, and injections.        PLANTAR FASCIITIS  Plantar fasciitis is often referred to as heel spurs or heel pain. Plantar fasciitis is a very common problem that affects people of all foot shapes, age, weight and activity level. Pain may be in the arch or on the weight-bearing surface of the heel. The pain may come on without injury or identifiable cause. Pain is generally present when first getting out of bed in the morning or up from a seated break.     CAUSES  The plantar fascia is a dense fibrous band of tissue that stretches across the bottom surface of the foot. The fascia helps support the foot muscles and arch. Plantar fasciitis is thought to be caused by mechanical strain or overload. Frequent walking without shoes or wearing unsupportive shoes is thought to cause structural overload and ultimately inflammation of the plantar fascia. Some people have heel spurs that can be seen on x-ray. The heel spur is actually a minor component of plantar fascitis and is largely ignored.       SELF TREATMENT   The easiest solution is to stop walking around your home without shoes. Plantar fasciitis is largely a shoe problem. Shoes are either not being worn often enough or your current shoes are inadequate for your weight,  foot structure or activity level. The majority of shoes on the market today are not sufficient to resist development of plantar fasciitis or to promote healing. Assume that your current shoes are inadequate and will need to be replaced. Even high quality shoes wear out with 6 months to one year of frequent use. Weight loss is another option. Losing ten pounds in the next two months may be enough to resolve the problem. Ice applied to the area of pain two to three times per day for ten minutes each session can be very helpful. Warm foot soaks in epsom salts can also relieve pain. This should continue until the problem resolves. Achilles tendon stretching is essential. Stretch multiple times daily to promote healing and to prevent recurrence in the future. Over all stretching of the body is helpful as well such as the calves, thighs and lower back. Normally when one area of the body is tight, other areas are too. Gentle Yoga can be good for this.     Over the counter topical anti inflammatories can be helpful such as biofreeze, bengay, salon pas, ect...  Oral ibuprofen or aleve is recommended as well to try to calm down inflammation.     Night splints can be helpful to gradually stretch the foot at night as a lot of pain is when you get up in the morning. Taking a towel or thera band and stretching the foot back multiple times before you get ou of bed can be beneficial as well.     MEDICAL TREATMENT  Medical treatments often include custom arch supports, cortisone injections, physical therapy, splints to be worn in bed, prescription medications and surgery. The home treatments listed above will be necessary regardless of these advanced medical treatments. Surgery is rarely needed but is very helpful in selected cases.     PROGNOSIS  Plantar fasciitis can last from one day to a lifetime. Some people get intermittent fascitis that is very short-lived. Others suffer daily for years. Excessive body weight, frequent bare  foot walking, long hours on the feet, inadequate shoes, predisposing foot structures and excessive activity such as running are all potential issues that lead to chronic and/or recurring plantar fascitis. Having plantar fasciitis means that you are forever prone to this problem and will require modification of some of the above factors. Most people seek treatment within one to four months. Healing usually requires a similar one to four month time frame. Healing time is relative to the amount of effort spent treating the problem.   Plantar fasciitis is highly recurrent. Risk factors often continue, including return to bare foot walking, inadequate shoes, excessive body weight, excessive activities, etc. Your life style and foot structure may predispose you to recurrent plantar fasciitis. A daily prevention regimen can be very helpful. Ongoing use of shoe inserts, careful attention to appropriate shoes, daily Achilles stretching, etc. may prevent recurrence. Prompt attention at the earliest warning signs of heel pain can resolve the problem in as short as a few days.     EXERCISES  Stair Exercise: Step on the stairs with the ball of your foot and hold your position for at least 15 seconds, then slowly step down with the heels of your foot. You can do this daily and as often as you want.   Picking the Towel: Sit comfortably and then pick the towel up with your toes. You can use any object other than a towel as long as the material can be soft and you can pick it up with your toes.  Rolling the Bottle: Use a small ball or frozen water bottle and then roll it around with your foot.   Flex the Toes: Sit comfortably and then flex your toes by pointing it towards the floor or towards your body. This will relax and flex your foot and exercise your plantar fascia, the calf, and the Achilles tendon. The inability of the foot to stretch often causes the bunching up of the plantar fascia area leading to the pain.  Calf/Achilles  Stretching: Lay on you back and raise one foot, then point your toes towards the floor. See photo below:               Hold each stretch for 10 seconds. Stretch 10 times per set, three sets per day. Morning, afternoon and evening. If your heel pain is very severe in the morning, consider doing the first set of stretches before you get out of bed.      OVER THE COUNTER INSERT RECOMMENDATIONS  SuperFeet   Sofsole Fit Spenco   Power Step   Walk-Fit Arch Cradles     Most of these can be found at your local [employer], [employer], or online.  **A good high quality over the counter insert should cost around $40-$50      [employer] LOCATIONS  [place]  [address] [place] [address]  [phone]   [address]  [phone] [place]  [address] [place] [address]  [phone]   [place]  [address] [place] [address] [address]  [phone]   [place]  [address]  [phone] [place] [address]  [phone]   [place]  [address]  [phone]   [place]  [address] [place] [address]  [phone] [place]  [address]  [phone]

## 2018-01-08 NOTE — NURSING NOTE
"Chief Complaint   Patient presents with     Foot Problems     left heel pain [id number] on the bottom, does not recall any injury        Initial /70  Ht 5' 6.75\" (1.695 m)  Wt 176 lb 8 oz (80.1 kg)  BMI 27.85 kg/m2 Estimated body mass index is 27.85 kg/(m^2) as calculated from the following:    Height as of this encounter: 5' 6.75\" (1.695 m).    Weight as of this encounter: 176 lb 8 oz (80.1 kg).  Medication Reconciliation: complete   Yakov Johnston MA      "

## 2018-01-08 NOTE — PROGRESS NOTES
ASSESSMENT/PLAN:    Encounter Diagnoses   Name Primary?     Plantar fascial fibromatosis Yes     Hallux valgus of right foot      Pronation of both feet      It was not her chief complaint, but I discussed then nature of bunions and joint arthritis.  It does not bother her much.  Adequate shoe width and support discussed.      Pt educated about the cause and nature of heel pain.  Treatment plan discussed includes icing, calf and plantar fascial stretching, avoidance of barefoot walking, wearing sturdy, supportive athletic-type shoes, and activity modification.  Educational handouts provided.    Over the counter inserts/ arch support discussed.    Body mass index is 27.85 kg/(m^2).    Weight management plan: Patient was referred to their PCP to discuss a diet and exercise plan.      Edilberto Do DPM, FACFAS, MS    [facility]le [facility]      ____________________________________________________________________    HPI:         Chief Complaint: left heel pain  Onset of problem: 6 weeks  Ratin/10   Frequency:  daily    The pain is made worse with walking  Previous treatment: she purchased some over the counter inserts for her shoes  *  Past Medical History:   Diagnosis Date     Anxiety      Diffuse cystic mastopathy      Disturbances of sulphur-bearing amino-acid metabolism      ECZEMATOUS DERMATITIS      MILD MITRAL VALVE INSUFFUCIENCY      Osteopenia      Other and unspecified hyperlipidemia      Personal history of colonic polyps     Hyperplastic     Rotator cuff (capsule) sprain      right with 3mm supraspinatous tendon tear     Synovial cyst of popliteal space 03    right     TIA (transient ischemic attack) 16     Vitamin D deficiency      Vitamin D deficiency disease 2013   *  *  Past Surgical History:   Procedure Laterality Date     C NONSPECIFIC PROCEDURE      appy   *  *  Current Outpatient Prescriptions   Medication Sig Dispense Refill      "LORazepam (ATIVAN) 0.5 MG tablet 1 tab daily prn anxiety 30 tablet 0     pravastatin (PRAVACHOL) 80 MG tablet Take 1 tablet by mouth. at bedtime. 90 tablet 3     clopidogrel (PLAVIX) 75 MG tablet Take 1 tablet (75 mg) by mouth daily 90 tablet 3     Cholecalciferol (VITAMIN D) 2000 UNITS tablet 2 tabs (total 4,000 IU) daily 200 tablet 3     MULTIVITAMIN TABS   OR 1 tablet daily       CALCIUM + D 600-200 MG-IU OR TABS 1 TABLET DAILY         ROS:     A 10-point review of systems was performed and is positive for that noted in the HPI and as seen below.  All other areas are negative.     Numbness in feet?  no   Calf pain with walking? no  Recent foot/ankle injury? no  Weight change over past 12 months? no  Self perception as overweight? yes  Recent flu-like symptoms? no  Joint pain other than feet ? no    Social History: Employment:  no;  Exercise/Physical activity:  yes;  Tobacco use:  no  Social History     Social History     Marital status:      Spouse name: N/A     Number of children: N/A     Years of education: N/A     Occupational History     Not on file.     Social History Main Topics     Smoking status: Never Smoker     Smokeless tobacco: Never Used     Alcohol use Yes      Comment: occ     Drug use: No     Sexual activity: Yes     Partners: Male     Other Topics Concern     Not on file     Social History Narrative       Family history:  Family History   Problem Relation Age of Onset     Cardiovascular Mother      MI     Cardiovascular Father      Aneurysm     Breast Cancer Sister      C.A.D. Brother      Brother       Rheumatoid arthritis:  yes  Foot Problems: sibling - swelling  Diabetes: no      EXAM:    Vitals: /70  Ht 5' 6.75\" (1.695 m)  Wt 176 lb 8 oz (80.1 kg)  BMI 27.85 kg/m2  BMI: Body mass index is 27.85 kg/(m^2).  Height: 5' 6.75\"    Constitutional/ general:  Pt is in no apparent distress, appears well-nourished.  Cooperative with history and physical exam.     Vascular:  Pedal pulses " are palpable bilaterally for both the DP and PT arteries.  CFT < 3 sec.  No edema.  Pedal hair growth noted.     Neuro:  Alert and oriented x 3. Coordinated gait.  Light touch sensation is intact to the L4, L5, S1 distributions. No obvious deficits.  No evidence of neurological-based weakness, spasticity, or contracture in the lower extremities.     Derm: Normal texture and turgor.   No open lesions.  Some erythema, without skin breakdown, over the right foot bunion bump.     Musculoskeletal:    Lower extremity muscle strength is normal.  Patient is ambulatory without an assistive device or brace .  Hallux abducto valgus right foot.  The deformity is not fully reducible in the transverse plane - there is tracking.  Also very limited sagittal plane ROM at the 1st metatarsophalangeal joint.      Pain on palpation to the plantar medial aspect of the left heel.  No pain with side-to-side compression of the heel.      Edilberto Do DPM, FACFAS, MS    [facility] & [facility]le Surgery

## 2018-01-08 NOTE — LETTER
2018         RE: Liana Giraldo   [place] [address]        Dear Colleague,    Thank you for referring your patient, Liana Giraldo, to the [facility]. Please see a copy of my visit note below.      ASSESSMENT/PLAN:    Encounter Diagnoses   Name Primary?     Plantar fascial fibromatosis Yes     Hallux valgus of right foot      Pronation of both feet      It was not her chief complaint, but I discussed then nature of bunions and joint arthritis.  It does not bother her much.  Adequate shoe width and support discussed.      Pt educated about the cause and nature of heel pain.  Treatment plan discussed includes icing, calf and plantar fascial stretching, avoidance of barefoot walking, wearing sturdy, supportive athletic-type shoes, and activity modification.  Educational handouts provided.    Over the counter inserts/ arch support discussed.    Body mass index is 27.85 kg/(m^2).    Weight management plan: Patient was referred to their PCP to discuss a diet and exercise plan.      Edilberto Do DPM, FACFAS, MS    [facility] & [facility]le [facility]      ____________________________________________________________________    HPI:         Chief Complaint: left heel pain  Onset of problem: 6 weeks  Ratin/10   Frequency:  daily    The pain is made worse with walking  Previous treatment: she purchased some over the counter inserts for her shoes  *  Past Medical History:   Diagnosis Date     Anxiety      Diffuse cystic mastopathy      Disturbances of sulphur-bearing amino-acid metabolism      ECZEMATOUS DERMATITIS      MILD MITRAL VALVE INSUFFUCIENCY      Osteopenia      Other and unspecified hyperlipidemia      Personal history of colonic polyps     Hyperplastic     Rotator cuff (capsule) sprain      right with 3mm supraspinatous tendon tear     Synovial cyst of popliteal space 03    right     TIA (transient ischemic attack) 16      "Vitamin D deficiency      Vitamin D deficiency disease 2/23/2013   *  *  Past Surgical History:   Procedure Laterality Date     C NONSPECIFIC PROCEDURE      appy   *  *  Current Outpatient Prescriptions   Medication Sig Dispense Refill     LORazepam (ATIVAN) 0.5 MG tablet 1 tab daily prn anxiety 30 tablet 0     pravastatin (PRAVACHOL) 80 MG tablet Take 1 tablet by mouth. at bedtime. 90 tablet 3     clopidogrel (PLAVIX) 75 MG tablet Take 1 tablet (75 mg) by mouth daily 90 tablet 3     Cholecalciferol (VITAMIN D) 2000 UNITS tablet 2 tabs (total 4,000 IU) daily 200 tablet 3     MULTIVITAMIN TABS   OR 1 tablet daily       CALCIUM + D 600-200 MG-IU OR TABS 1 TABLET DAILY         ROS:     A 10-point review of systems was performed and is positive for that noted in the HPI and as seen below.  All other areas are negative.     Numbness in feet?  no   Calf pain with walking? no  Recent foot/ankle injury? no  Weight change over past 12 months? no  Self perception as overweight? yes  Recent flu-like symptoms? no  Joint pain other than feet ? no    Social History: Employment:  no;  Exercise/Physical activity:  yes;  Tobacco use:  no  Social History     Social History     Marital status:      Spouse name: N/A     Number of children: N/A     Years of education: N/A     Occupational History     Not on file.     Social History Main Topics     Smoking status: Never Smoker     Smokeless tobacco: Never Used     Alcohol use Yes      Comment: occ     Drug use: No     Sexual activity: Yes     Partners: Male     Other Topics Concern     Not on file     Social History Narrative       Family history:  Family History   Problem Relation Age of Onset     Cardiovascular Mother      MI     Cardiovascular Father      Aneurysm     Breast Cancer Sister      C.A.D. Brother      Brother       Rheumatoid arthritis:  yes  Foot Problems: sibling - swelling  Diabetes: no      EXAM:    Vitals: /70  Ht 5' 6.75\" (1.695 m)  Wt 176 lb 8 oz " "(80.1 kg)  BMI 27.85 kg/m2  BMI: Body mass index is 27.85 kg/(m^2).  Height: 5' 6.75\"    Constitutional/ general:  Pt is in no apparent distress, appears well-nourished.  Cooperative with history and physical exam.     Vascular:  Pedal pulses are palpable bilaterally for both the DP and PT arteries.  CFT < 3 sec.  No edema.  Pedal hair growth noted.     Neuro:  Alert and oriented x 3. Coordinated gait.  Light touch sensation is intact to the L4, L5, S1 distributions. No obvious deficits.  No evidence of neurological-based weakness, spasticity, or contracture in the lower extremities.     Derm: Normal texture and turgor.   No open lesions.  Some erythema, without skin breakdown, over the right foot bunion bump.     Musculoskeletal:    Lower extremity muscle strength is normal.  Patient is ambulatory without an assistive device or brace .  Hallux abducto valgus right foot.  The deformity is not fully reducible in the transverse plane - there is tracking.  Also very limited sagittal plane ROM at the 1st metatarsophalangeal joint.      Pain on palpation to the plantar medial aspect of the left heel.  No pain with side-to-side compression of the heel.      Edilberto Do DPM, FACFAS, MS    [facility] & [facility]le Surgery                Again, thank you for allowing me to participate in the care of your patient.        Sincerely,        Edilberto Do DPM    "

## 2018-01-08 NOTE — MR AVS SNAPSHOT
After Visit Summary   1/8/2018    Liana Giraldo    MRN: [id number]           Patient Information     Date Of Birth          1940        Visit Information        Provider Department      1/8/2018 10:45 AM Edilberto Do DPM [facility]        Today's Diagnoses     Plantar fascial fibromatosis    -  1    Hallux valgus of right foot        Pronation of both feet          Care Instructions    Heel Pain Instructions:    1)  A rigid-soled, athletic shoe like a hiking shoe is recommended.    2)  Avoid barefoot walking in your home.  It is a good idea to wear a clean athletic shoe inside.    3)  Stretch your calf musculature and plantar fascia frequently.  It is a good idea to do this before getting out of bed.    4)  Ice and ibuprofen can help if pain is related to inflammation - more likely early on in the process    5)  Some good over the counter inserts might help:  Spenco, Super Feet, others. These are found at [employer], and other [employer]    If pain persists, please return to clinic.  Future options include a walking boot, physical therapy, night splints, and injections.        PLANTAR FASCIITIS  Plantar fasciitis is often referred to as heel spurs or heel pain. Plantar fasciitis is a very common problem that affects people of all foot shapes, age, weight and activity level. Pain may be in the arch or on the weight-bearing surface of the heel. The pain may come on without injury or identifiable cause. Pain is generally present when first getting out of bed in the morning or up from a seated break.     CAUSES  The plantar fascia is a dense fibrous band of tissue that stretches across the bottom surface of the foot. The fascia helps support the foot muscles and arch. Plantar fasciitis is thought to be caused by mechanical strain or overload. Frequent walking without shoes or wearing unsupportive shoes is thought to cause structural  overload and ultimately inflammation of the plantar fascia. Some people have heel spurs that can be seen on x-ray. The heel spur is actually a minor component of plantar fascitis and is largely ignored.       SELF TREATMENT   The easiest solution is to stop walking around your home without shoes. Plantar fasciitis is largely a shoe problem. Shoes are either not being worn often enough or your current shoes are inadequate for your weight, foot structure or activity level. The majority of shoes on the market today are not sufficient to resist development of plantar fasciitis or to promote healing. Assume that your current shoes are inadequate and will need to be replaced. Even high quality shoes wear out with 6 months to one year of frequent use. Weight loss is another option. Losing ten pounds in the next two months may be enough to resolve the problem. Ice applied to the area of pain two to three times per day for ten minutes each session can be very helpful. Warm foot soaks in epsom salts can also relieve pain. This should continue until the problem resolves. Achilles tendon stretching is essential. Stretch multiple times daily to promote healing and to prevent recurrence in the future. Over all stretching of the body is helpful as well such as the calves, thighs and lower back. Normally when one area of the body is tight, other areas are too. Gentle Yoga can be good for this.     Over the counter topical anti inflammatories can be helpful such as biofreeze, bengay, salon pas, ect...  Oral ibuprofen or aleve is recommended as well to try to calm down inflammation.     Night splints can be helpful to gradually stretch the foot at night as a lot of pain is when you get up in the morning. Taking a towel or thera band and stretching the foot back multiple times before you get ou of bed can be beneficial as well.     MEDICAL TREATMENT  Medical treatments often include custom arch supports, cortisone injections, physical  therapy, splints to be worn in bed, prescription medications and surgery. The home treatments listed above will be necessary regardless of these advanced medical treatments. Surgery is rarely needed but is very helpful in selected cases.     PROGNOSIS  Plantar fasciitis can last from one day to a lifetime. Some people get intermittent fascitis that is very short-lived. Others suffer daily for years. Excessive body weight, frequent bare foot walking, long hours on the feet, inadequate shoes, predisposing foot structures and excessive activity such as running are all potential issues that lead to chronic and/or recurring plantar fascitis. Having plantar fasciitis means that you are forever prone to this problem and will require modification of some of the above factors. Most people seek treatment within one to four months. Healing usually requires a similar one to four month time frame. Healing time is relative to the amount of effort spent treating the problem.   Plantar fasciitis is highly recurrent. Risk factors often continue, including return to bare foot walking, inadequate shoes, excessive body weight, excessive activities, etc. Your life style and foot structure may predispose you to recurrent plantar fasciitis. A daily prevention regimen can be very helpful. Ongoing use of shoe inserts, careful attention to appropriate shoes, daily Achilles stretching, etc. may prevent recurrence. Prompt attention at the earliest warning signs of heel pain can resolve the problem in as short as a few days.     EXERCISES  Stair Exercise: Step on the stairs with the ball of your foot and hold your position for at least 15 seconds, then slowly step down with the heels of your foot. You can do this daily and as often as you want.   Picking the Towel: Sit comfortably and then pick the towel up with your toes. You can use any object other than a towel as long as the material can be soft and you can pick it up with your  toes.  Rolling the Bottle: Use a small ball or frozen water bottle and then roll it around with your foot.   Flex the Toes: Sit comfortably and then flex your toes by pointing it towards the floor or towards your body. This will relax and flex your foot and exercise your plantar fascia, the calf, and the Achilles tendon. The inability of the foot to stretch often causes the bunching up of the plantar fascia area leading to the pain.  Calf/Achilles Stretching: Lay on you back and raise one foot, then point your toes towards the floor. See photo below:               Hold each stretch for 10 seconds. Stretch 10 times per set, three sets per day. Morning, afternoon and evening. If your heel pain is very severe in the morning, consider doing the first set of stretches before you get out of bed.      OVER THE COUNTER INSERT RECOMMENDATIONS  SuperFeet   Sofsole Fit Spenco   Power Step   Walk-Fit Arch Cradles     Most of these can be found at your local [employer], sporting [employer] stores, or online.  **A good high quality over the counter insert should cost around $40-$50      [employer] [address]  [phone]   [address]  [phone] [place]  [address] [place] [address]  [phone]   [address]  [phone]   [place]  [address] [facility]  [phone] [place] [address]  [phone]   [place]  [address]  [phone]   [place]  [address]  [phone] [place]  [address]  [phone]                 Follow-ups after your visit        Your next 10 appointments already scheduled     Mar 22, 2018  9:30 AM CDT   [facility]    [address] [address] [phone]              Who to contact     If you have questions or need follow up information about today's clinic  "visit or your schedule please contact [facility] directly at [phone].  Normal or non-critical lab and imaging results will be communicated to you by MyChart, letter or phone within 4 business days after the clinic has received the results. If you do not hear from us within 7 days, please contact the clinic through MyChart or phone. If you have a critical or abnormal lab result, we will notify you by phone as soon as possible.  Submit refill requests through [employer] or call your pharmacy and they will forward the refill request to us. Please allow 3 business days for your refill to be completed.          Additional Information About Your Visit        [employer] Information     [employer] gives you secure access to your electronic health record. If you see a primary care provider, you can also send messages to your care team and make appointments. If you have questions, please call your primary care clinic.  If you do not have a primary care provider, please call [phone] and they will assist you.        Care EveryWhere ID     This is your Care EveryWhere ID. This could be used by other organizations to access your [facility] medical records  [id number]        Your Vitals Were     Height BMI (Body Mass Index)                5' 6.75\" (1.695 m) 27.85 kg/m2           Blood Pressure from Last 3 Encounters:   01/08/18 118/70   04/20/17 112/71   03/20/17 128/72    Weight from Last 3 Encounters:   01/08/18 176 lb 8 oz (80.1 kg)   03/20/17 172 lb (78 kg)   03/18/16 171 lb (77.6 kg)              Today, you had the following     No orders found for display       Primary Care Provider Office Phone # Fax #    Dariel Weber [phone]       [address] [facility] [address]        Equal Access to Services     SHAYE RANKIN : Hemal Hampton, joyce stone, leonard lewis. So [facility] [phone].    ATENCIÓN: Si habla " español, tiene a basurto disposición servicios gratuitos de asistencia lingüística. Melissa angel [phone].    We comply with applicable federal civil rights laws and [place] laws. We do not discriminate on the basis of race, color, national origin, age, disability, sex, sexual orientation, or gender identity.            Thank you!     Thank you for choosing [facility]  for your care. Our goal is always to provide you with excellent care. Hearing back from our patients is one way we can continue to improve our services. Please take a few minutes to complete the written survey that you may receive in the mail after your visit with us. Thank you!             Your Updated Medication List - Protect others around you: Learn how to safely use, store and throw away your medicines at www.disposemymeds.org.          This list is accurate as of: 1/8/18 11:05 AM.  Always use your most recent med list.                   Brand Name Dispense Instructions for use Diagnosis    calcium + D 600-200 MG-UNIT Tabs   Generic drug:  calcium carbonate-vitamin D      1 TABLET DAILY        clopidogrel 75 MG tablet    PLAVIX    90 tablet    Take 1 tablet (75 mg) by mouth daily    Hyperlipidemia LDL goal <130       LORazepam 0.5 MG tablet    ATIVAN    30 tablet    1 tab daily prn anxiety    Anxiety       MULTIVITAMIN TABS   OR      1 tablet daily        pravastatin 80 MG tablet    PRAVACHOL    90 tablet    Take 1 tablet by mouth. at bedtime.    Hyperlipidemia LDL goal <130       vitamin D 2000 UNITS tablet     200 tablet    2 tabs (total 4,000 IU) daily    Vitamin D deficiency disease

## 2018-03-22 ENCOUNTER — OFFICE VISIT (OUTPATIENT)
Dept: INTERNAL MEDICINE | Facility: CLINIC | Age: 78
End: 2018-03-22
Payer: COMMERCIAL

## 2018-03-22 VITALS
HEART RATE: 66 BPM | DIASTOLIC BLOOD PRESSURE: 72 MMHG | TEMPERATURE: 97.9 F | BODY MASS INDEX: 27.14 KG/M2 | WEIGHT: 172 LBS | RESPIRATION RATE: 16 BRPM | SYSTOLIC BLOOD PRESSURE: 120 MMHG

## 2018-03-22 DIAGNOSIS — R53.83 OTHER FATIGUE: ICD-10-CM

## 2018-03-22 DIAGNOSIS — E78.5 HYPERLIPIDEMIA LDL GOAL <130: ICD-10-CM

## 2018-03-22 DIAGNOSIS — Z86.73 HISTORY OF TIA (TRANSIENT ISCHEMIC ATTACK) AND STROKE: ICD-10-CM

## 2018-03-22 DIAGNOSIS — Z00.00 MEDICARE ANNUAL WELLNESS VISIT, SUBSEQUENT: Primary | ICD-10-CM

## 2018-03-22 DIAGNOSIS — E55.9 VITAMIN D DEFICIENCY DISEASE: ICD-10-CM

## 2018-03-22 DIAGNOSIS — M85.80 OSTEOPENIA, UNSPECIFIED LOCATION: ICD-10-CM

## 2018-03-22 DIAGNOSIS — M65.4 RADIAL STYLOID TENOSYNOVITIS: ICD-10-CM

## 2018-03-22 LAB
ALBUMIN SERPL-MCNC: 3.7 G/DL (ref 3.4–5)
ALP SERPL-CCNC: 77 U/L (ref 40–150)
ALT SERPL W P-5'-P-CCNC: 14 U/L (ref 0–50)
ANION GAP SERPL CALCULATED.3IONS-SCNC: 6 MMOL/L (ref 3–14)
AST SERPL W P-5'-P-CCNC: 15 U/L (ref 0–45)
BILIRUB SERPL-MCNC: 0.7 MG/DL (ref 0.2–1.3)
BUN SERPL-MCNC: 12 MG/DL (ref 7–30)
CALCIUM SERPL-MCNC: 8.8 MG/DL (ref 8.5–10.1)
CHLORIDE SERPL-SCNC: 107 MMOL/L (ref 94–109)
CHOLEST SERPL-MCNC: 190 MG/DL
CO2 SERPL-SCNC: 28 MMOL/L (ref 20–32)
CREAT SERPL-MCNC: 0.8 MG/DL (ref 0.52–1.04)
ERYTHROCYTE [DISTWIDTH] IN BLOOD BY AUTOMATED COUNT: 13.1 % (ref 10–15)
GFR SERPL CREATININE-BSD FRML MDRD: 69 ML/MIN/1.7M2
GLUCOSE SERPL-MCNC: 96 MG/DL (ref 70–99)
HCT VFR BLD AUTO: 42.4 % (ref 35–47)
HDLC SERPL-MCNC: 57 MG/DL
HGB BLD-MCNC: 13.9 G/DL (ref 11.7–15.7)
LDLC SERPL CALC-MCNC: 116 MG/DL
MCH RBC QN AUTO: 31.4 PG (ref 26.5–33)
MCHC RBC AUTO-ENTMCNC: 32.8 G/DL (ref 31.5–36.5)
MCV RBC AUTO: 96 FL (ref 78–100)
NONHDLC SERPL-MCNC: 133 MG/DL
PLATELET # BLD AUTO: 296 10E9/L (ref 150–450)
POTASSIUM SERPL-SCNC: 4.4 MMOL/L (ref 3.4–5.3)
PROT SERPL-MCNC: 7.9 G/DL (ref 6.8–8.8)
RBC # BLD AUTO: 4.42 10E12/L (ref 3.8–5.2)
SODIUM SERPL-SCNC: 141 MMOL/L (ref 133–144)
TRIGL SERPL-MCNC: 84 MG/DL
TSH SERPL DL<=0.005 MIU/L-ACNC: 2.26 MU/L (ref 0.4–4)
WBC # BLD AUTO: 5 10E9/L (ref 4–11)

## 2018-03-22 PROCEDURE — G0439 PPPS, SUBSEQ VISIT: HCPCS | Performed by: INTERNAL MEDICINE

## 2018-03-22 PROCEDURE — 85027 COMPLETE CBC AUTOMATED: CPT | Performed by: INTERNAL MEDICINE

## 2018-03-22 PROCEDURE — 80061 LIPID PANEL: CPT | Performed by: INTERNAL MEDICINE

## 2018-03-22 PROCEDURE — 80053 COMPREHEN METABOLIC PANEL: CPT | Performed by: INTERNAL MEDICINE

## 2018-03-22 PROCEDURE — 36415 COLL VENOUS BLD VENIPUNCTURE: CPT | Performed by: INTERNAL MEDICINE

## 2018-03-22 PROCEDURE — 84443 ASSAY THYROID STIM HORMONE: CPT | Performed by: INTERNAL MEDICINE

## 2018-03-22 RX ORDER — CHOLECALCIFEROL (VITAMIN D3) 50 MCG
TABLET ORAL
Qty: 90 TABLET | Refills: 3 | Status: SHIPPED | OUTPATIENT
Start: 2018-03-22 | End: 2019-12-02

## 2018-03-22 RX ORDER — CLOPIDOGREL BISULFATE 75 MG/1
75 TABLET ORAL DAILY
Qty: 90 TABLET | Refills: 3 | Status: SHIPPED | OUTPATIENT
Start: 2018-03-22 | End: 2018-06-28

## 2018-03-22 RX ORDER — PRAVASTATIN SODIUM 80 MG/1
TABLET ORAL
Qty: 90 TABLET | Refills: 3 | Status: SHIPPED | OUTPATIENT
Start: 2018-03-22 | End: 2019-04-02

## 2018-03-22 ASSESSMENT — PAIN SCALES - GENERAL: PAINLEVEL: MILD PAIN (2)

## 2018-03-22 ASSESSMENT — ACTIVITIES OF DAILY LIVING (ADL)
I_NEED_ASSISTANCE_FOR_THE_FOLLOWING_DAILY_ACTIVITIES:: NO ASSISTANCE IS NEEDED
CURRENT_FUNCTION: NO ASSISTANCE NEEDED

## 2018-03-22 NOTE — PROGRESS NOTES
SUBJECTIVE:   Liana Giraldo is a 78 year old female who presents for Preventive Visit.      Are you in the first 12 months of your Medicare Part B coverage?  No    Healthy Habits:  Answers for HPI/ROS submitted by the patient on 3/22/2018   Annual Exam:  Getting at least 3 servings of Calcium per day:: Yes  Bi-annual eye exam:: Yes  Dental care twice a year:: Yes  Sleep apnea or symptoms of sleep apnea:: Daytime drowsiness  Diet:: Regular (no restrictions)  Taking medications regularly:: Yes  Medication side effects:: None  Additional concerns today:: YES  Activities of Daily Living: no assistance needed  Home safety: throw rugs in the hallway  Hearing Impairment:: no hearing concerns  PHQ-2 Score: 0         Ability to successfully perform activities of daily living: Yes, no assistance needed    Home safety:  none identified     Hearing impairment: No    Fall risk:  Fallen 2 or more times in the past year?: No  Any fall with injury in the past year?: No        COGNITIVE SCREEN  1) Repeat 3 items (Banana, Sunrise, Chair)    2) Clock draw: NORMAL  3) 3 item recall: Recalls 3 objects  Results: NORMAL clock, 1-2 items recalled: COGNITIVE IMPAIRMENT LESS LIKELY    Mini-CogTM Copyright S Mo. Licensed by the author for use in [facility]; reprinted with permission (soob@[facility]). All rights reserved.                Reviewed and updated as needed this visit by clinical staff  Tobacco  Allergies  Meds         Reviewed and updated as needed this visit by Provider        Social History   Substance Use Topics     Smoking status: Never Smoker     Smokeless tobacco: Never Used     Alcohol use Yes      Comment: occ       If you drink alcohol do you typically have >3 drinks per day or >7 drinks per week? No                        Today's PHQ-2 Score:   PHQ-2 ( 1999 Pfizer) 3/22/2018 3/20/2017   Q1: Little interest or pleasure in doing things 0 0   Q2: Feeling down, depressed or hopeless 0 0   PHQ-2 Score 0 0    Q1: Little interest or pleasure in doing things Not at all -   Q2: Feeling down, depressed or hopeless Not at all -   PHQ-2 Score 0 -       Do you feel safe in your environment - Yes    Do you have a Health Care Directive?: Yes: Advance Directive has been received and scanned.    Current providers sharing in care for this patient include:   Patient Care Team:  Dariel Weber MD as PCP - General    The following health maintenance items are reviewed in Epic and correct as of today:  Health Maintenance   Topic Date Due     LIPID MONITORING Q1 YEAR  01/16/2018     ADVANCE DIRECTIVE PLANNING Q5 YRS  02/20/2018     FALL RISK ASSESSMENT  03/20/2018     TETANUS IMMUNIZATION (SYSTEM ASSIGNED)  03/20/2027     DEXA SCAN SCREENING (SYSTEM ASSIGNED)  Completed     PNEUMOCOCCAL  Completed     Labs reviewed in EPIC        ROS:  CONSTITUTIONAL: NEGATIVE for fever, chills. Weight down 4 pounds after prior 5 pound gain. HAs some general fatigue. Sleeping well per pt. Denies known snoring  INTEGUMENTARY/SKIN: NEGATIVE for worrisome rashes, moles or lesions  EYES: NEGATIVE for vision changes or irritation. Has glasses. Eye exam Nov 2017  ENT/MOUTH: NEGATIVE for ear, mouth and throat problems  RESP: NEGATIVE for significant cough or SOB  BREAST: NEGATIVE for masses, tenderness or discharge  CV: NEGATIVE for chest pain, palpitations or peripheral edema  GI: NEGATIVE for nausea, abdominal pain, heartburn (except with rare foods) or change in bowel habits  : NEGATIVE for frequency, dysuria, or hematuria. No vaginal sx  MUSCULOSKELETAL: NEGATIVE for significant arthralgias or myalgia except for occ right thumb base wrist pain. No trauma  NEURO: NEGATIVE for weakness, dizziness or paresthesias  ENDOCRINE: NEGATIVE for temperature intolerance. On statin therapy  HEME: NEGATIVE for bleeding problems  PSYCHIATRIC: NEGATIVE for changes in mood or affect recently. Has not used  Lorazepam in quite awhile    OBJECTIVE:   /72  Pulse 66   "Temp 97.9  F (36.6  C) (Oral)  Resp 16  Wt 172 lb (78 kg)  BMI 27.14 kg/m2 Estimated body mass index is 27.14 kg/(m^2) as calculated from the following:    Height as of 1/8/18: 5' 6.75\" (1.695 m).    Weight as of this encounter: 172 lb (78 kg).  EXAM:   General appearance - healthy, alert, no distress. Calmnaffect  Skin - No rashes or lesions.  Head - normocephalic, atraumatic  Eyes - RAFI, EOMI, fundi exam with nondilated pupils negative.  Ears - External ears normal. Canals clear. TM's normal.  Nose/Sinuses - Nares normal. Septum midline. Mucosa normal. No drainage or sinus tenderness.  Oropharynx - No erythema, no adenopathy, no exudates.  Neck - Supple without adenopathy or thyromegaly. No bruits.  Lungs - Clear to auscultation without wheezes/rhonchi.  Heart - Regular rate and rhythm without murmurs, clicks, or gallops.  Nodes - No supraclavicular, axillary, or inguinal adenopathy palpable.  Breasts - deferred  Abdomen - Abdomen soft, non-tender. BS normal. No masses or hepatosplenomegaly palpable. No bruits.  Extremities -No cyanosis, clubbing or edema.    Musculoskeletal - Spine ROM normal. Muscular strength intact.   Positive Finklestein's test right wrist/thumb base. Small ganglion cyst also palpable   Palmar side of  Right wrist on radial side  Peripheral pulses - radial=4/4, femoral=4/4, posterior tibial=4/4, dorsalis pedis=4/4,  Neuro - Gait normal. Reflexes normal and symmetric. Sensation grossly WNL.  Genital/Rectal - deferred        ASSESSMENT / PLAN:   1. Medicare annual wellness visit, subsequent  Healthcare maintenance up-to-date.  Repeat mammogram late 2018.  Discussed possible future Shingrx vaccine.  Patient will review insurance coverage first    2. Hyperlipidemia LDL goal <130  Labs as ordered.  Continue current medication  - pravastatin (PRAVACHOL) 80 MG tablet; Take 1 tablet by mouth. at bedtime.  Dispense: 90 tablet; Refill: 3  - Comprehensive metabolic panel  - Lipid panel reflex to " "direct LDL Fasting    3. Vitamin D deficiency disease  Vitamin D level at goal with supplementation.  Will continue  - Cholecalciferol (VITAMIN D) 2000 UNITS tablet; 1 tab  daily  Dispense: 90 tablet; Refill: 3    4. History of TIA (transient ischemic attack) and stroke  Patient has remained asymptomatic since Plavix and statin therapy use.  Continue current medication  - clopidogrel (PLAVIX) 75 MG tablet; Take 1 tablet (75 mg) by mouth daily  Dispense: 90 tablet; Refill: 3    5. Osteopenia, unspecified location  Due for f/u DEXA.   Not on therapy currently  - DX Hip/Pelvis/Spine; Future    6. Radial styloid tenosynovitis  Ice as needed.  Patient will monitor symptoms.  Patient will see Ortho  if more bothersome    7. Other fatigue  Screening labs.  Patient's  to watch patient's sleep to see if any sleep apnea.  Symptoms not consistent with adrenal insufficiency  - CBC with platelets  - TSH with free T4 reflex      End of Life Planning:  Patient currently has an advanced directive: Yes.  Practitioner is supportive of decision.    COUNSELING:  Reviewed preventive health counseling, as reflected in patient instructions        Estimated body mass index is 27.14 kg/(m^2) as calculated from the following:    Height as of 1/8/18: 5' 6.75\" (1.695 m).    Weight as of this encounter: 172 lb (78 kg).       reports that she has never smoked. She has never used smokeless tobacco.      Appropriate preventive services were discussed with this patient, including applicable screening as appropriate for cardiovascular disease, diabetes, osteopenia/osteoporosis, and glaucoma.  As appropriate for age/gender, discussed screening for colorectal cancer, prostate cancer, breast cancer, and cervical cancer. Checklist reviewing preventive services available has been given to the patient.    Reviewed patients plan of care and provided an AVS. The Basic Care Plan (routine screening as documented in Health Maintenance) for Liana meets " the Care Plan requirement. This Care Plan has been established and reviewed with the Patient.    Counseling Resources:  ATP IV Guidelines  Pooled Cohorts Equation Calculator  Breast Cancer Risk Calculator  FRAX Risk Assessment  ICSI Preventive Guidelines  Dietary Guidelines for Americans, 2010  USDA's MyPlate  ASA Prophylaxis  Lung CA Screening      PLAN:  Continue current meds  Prescriptions refilled.    Labs today as ordered  [facility]  - [facility]   Mammogram  Late July  Ice to  Right wrist area. Stretching in AM. If pain persists, then see [facility]. Sites include [facility] [phone] or [place] [phone] or [place] [phone]. Call for appointment.            Dariel Weber MD  [facility]

## 2018-03-22 NOTE — PATIENT INSTRUCTIONS
Continue current meds  Prescriptions refilled.    Labs today as ordered  Bone density test  - [facility]   Mammogram  Late July  Ice to  Right wrist area. Stretching in AM. If pain persists, then see [facility]. Sites include [facility] [phone] or [place] [phone].  Call for appointment.

## 2018-03-22 NOTE — MR AVS SNAPSHOT
After Visit Summary   3/22/2018    Liana Giraldo    MRN: [id number]           Patient Information     Date Of Birth          1940        Visit Information        Provider Department      3/22/2018 9:30 AM Dariel Weber MD [facility]        Today's Diagnoses     Medicare annual wellness visit, subsequent    -  1    Hyperlipidemia LDL goal <130        Vitamin D deficiency disease        Osteopenia, unspecified location        Other fatigue          Care Instructions    Continue current meds  Prescriptions refilled.    Labs today as ordered  Bone density test  - [facility]   Mammogram  Late July  Ice to  Right wrist area. Stretching in AM. If pain persists, then see [facility]. Sites include [facility] [phone] or [place] [phone].  Call for appointment.           Follow-ups after your visit        Future tests that were ordered for you today     Open Future Orders        Priority Expected Expires Ordered    DX Hip/Pelvis/Spine Routine  3/22/2019 3/22/2018            Who to contact     If you have questions or need follow up information about today's clinic visit or your schedule please contact [facility] directly at [phone].  Normal or non-critical lab and imaging results will be communicated to you by MyChart, letter or phone within 4 business days after the clinic has received the results. If you do not hear from us within 7 days, please contact the clinic through [employer] or phone. If you have a critical or abnormal lab result, we will notify you by phone as soon as possible.  Submit refill requests through [employer] or call your pharmacy and they will forward the refill request to us. Please allow 3 business days for your refill to be completed.          Additional Information About Your Visit        MyChart Information     [employer] gives you secure access to your electronic health record. If you see a primary care provider,  you can also send messages to your care team and make appointments. If you have questions, please call your primary care clinic.  If you do not have a primary care provider, please call [phone] and they will assist you.        Care EveryWhere ID     This is your Care EveryWhere ID. This could be used by other organizations to access your [place] medical records  [id number]        Your Vitals Were     Pulse Temperature Respirations BMI (Body Mass Index)          66 97.9  F (36.6  C) (Oral) 16 27.14 kg/m2         Blood Pressure from Last 3 Encounters:   03/22/18 120/72   01/08/18 118/70   04/20/17 112/71    Weight from Last 3 Encounters:   03/22/18 172 lb (78 kg)   01/08/18 176 lb 8 oz (80.1 kg)   03/20/17 172 lb (78 kg)              We Performed the Following     CBC with platelets     Comprehensive metabolic panel     Lipid panel reflex to direct LDL Fasting     TSH with free T4 reflex          Today's Medication Changes          These changes are accurate as of 3/22/18 10:12 AM.  If you have any questions, ask your nurse or doctor.               These medicines have changed or have updated prescriptions.        Dose/Directions    vitamin D 2000 UNITS tablet   This may have changed:  additional instructions   Used for:  Vitamin D deficiency disease   Changed by:  Dariel Weber MD        1 tab  daily   Quantity:  90 tablet   Refills:  3         Stop taking these medicines if you haven't already. Please contact your care team if you have questions.     calcium + D 600-200 MG-UNIT Tabs   Generic drug:  calcium carbonate-vitamin D   Stopped by:  Dariel Weber MD                Where to get your medicines      These medications were sent to [id number] - [place], [place]  [address]  [address] [address]     Phone:  [phone]     clopidogrel 75 MG tablet    pravastatin 80 MG tablet    vitamin D 2000 UNITS tablet                Primary Care Provider Office Phone # Fax #    Dariel MEAD  MD [place] [phone]       [address] [facility] [address]        Equal Access to Services     SHAYE RANKIN : Hadii aad ku hadterrymey [place], shellyda leejeevanha, jamar adammiguelda anabela, leonard simpson yovannypaco marcano laGeovanihoma motta. So [facility] [phone].    ATENCIÓN: Si habla español, tiene a basurto disposición servicios gratuitos de asistencia lingüística. Llame al [phone].    We comply with applicable federal civil rights laws and [place] laws. We do not discriminate on the basis of race, color, national origin, age, disability, sex, sexual orientation, or gender identity.            Thank you!     Thank you for choosing [facility]  for your care. Our goal is always to provide you with excellent care. Hearing back from our patients is one way we can continue to improve our services. Please take a few minutes to complete the written survey that you may receive in the mail after your visit with us. Thank you!             Your Updated Medication List - Protect others around you: Learn how to safely use, store and throw away your medicines at www.disposemymeds.org.          This list is accurate as of 3/22/18 10:12 AM.  Always use your most recent med list.                   Brand Name Dispense Instructions for use Diagnosis    clopidogrel 75 MG tablet    PLAVIX    90 tablet    Take 1 tablet (75 mg) by mouth daily    Hyperlipidemia LDL goal <130       LORazepam 0.5 MG tablet    ATIVAN    30 tablet    1 tab daily prn anxiety    Anxiety       MULTIVITAMIN TABS   OR      1 tablet daily        pravastatin 80 MG tablet    PRAVACHOL    90 tablet    Take 1 tablet by mouth. at bedtime.    Hyperlipidemia LDL goal <130       vitamin D 2000 UNITS tablet     90 tablet    1 tab  daily    Vitamin D deficiency disease

## 2018-03-27 ENCOUNTER — TRANSFERRED RECORDS (OUTPATIENT)
Dept: HEALTH INFORMATION MANAGEMENT | Facility: CLINIC | Age: 78
End: 2018-03-27

## 2018-04-02 ENCOUNTER — MYC MEDICAL ADVICE (OUTPATIENT)
Dept: INTERNAL MEDICINE | Facility: CLINIC | Age: 78
End: 2018-04-02

## 2018-04-02 DIAGNOSIS — E78.5 HYPERLIPIDEMIA LDL GOAL <130: Primary | ICD-10-CM

## 2018-06-01 ENCOUNTER — ALLIED HEALTH/NURSE VISIT (OUTPATIENT)
Dept: PEDIATRICS | Facility: CLINIC | Age: 78
End: 2018-06-01
Payer: COMMERCIAL

## 2018-06-01 VITALS — SYSTOLIC BLOOD PRESSURE: 124 MMHG | DIASTOLIC BLOOD PRESSURE: 76 MMHG

## 2018-06-01 DIAGNOSIS — Z01.30 BP CHECK: Primary | ICD-10-CM

## 2018-06-01 PROCEDURE — 99207 ZZC NO CHARGE NURSE ONLY: CPT | Performed by: INTERNAL MEDICINE

## 2018-06-01 NOTE — PROGRESS NOTES
Liana Giraldo is enrolled/participating in the retail pharmacy Blood Pressure Goals Achievement Program (BPGAP).  Liana Giraldo was evaluated at [facility] on June 1, 2018 at which time her blood pressure was:    BP Readings from Last 3 Encounters:   06/01/18 124/76   03/22/18 120/72   01/08/18 118/70     Reviewed lifestyle modifications for blood pressure control and reduction: including making healthy food choices, managing weight, getting regular exercise, smoking cessation, reducing alcohol consumption, monitoring blood pressure regularly.     Liana Giraldo is not experiencing symptoms.    Follow-Up: BP is at goal of < 150/90 mmHg (patient 60+ years of age without diabetes).  Recommended follow-up at pharmacy in 6 months.     Recommendation to Provider: none    Liana Giraldo was evaluated for enrollment into the PGEN study today.    Patient eligible for enrollment:  No  Patient interested in enrollment:  Unknown    Completed by: Thank You~  Margaret Monge, PharmD,   [facility], [place]  [phone]

## 2018-06-01 NOTE — MR AVS SNAPSHOT
"              After Visit Summary   6/1/2018    Liana Giraldo    MRN: [id number]           Patient Information     Date Of Birth          1940        Visit Information        Provider Department      6/1/2018 1:16 PM Dariel Weber MD [facility]        Today's Diagnoses     BP check    -  1       Follow-ups after your visit        Your next 10 appointments already scheduled     Jul 25, 2018 10:15 AM CDT   LAB with [facility]   [facility]    [address] [place] [id number]   [phone]           Please do not eat 10-12 hours before your appointment if you are coming in fasting for labs on lipids, cholesterol, or glucose (sugar). This does not apply to pregnant women. Water, hot tea and black coffee (with nothing added) are okay. Do not drink other fluids, diet soda or chew gum.            Jul 25, 2018 10:30 AM CDT   MA SCREENING DIGITAL BILATERAL with [facility]   [facility]    [address] [place] [id number]   [phone]           Do not use any powder, lotion or deodorant under your arms or on your breast. If you do, we will ask you to remove it before your exam.  Wear comfortable, two-piece clothing.  If you have any allergies, tell your care team.  Bring any previous mammograms from other facilities or have them mailed to the breast center. Three-dimensional (3D) mammograms are available at [place] locations in [facility], [facility], [facility], and [place]. [facility] locations include [place] [facility] in [place]. Benefits of 3D mammograms include: - Improved rate of cancer detection - Decreases your chance of having to go back for more tests, which means fewer: - \"False-positive\" results (This means that there is an abnormal area but it isn't cancer.) - Invasive testing " procedures, such as a biopsy or surgery - Can provide clearer images of the breast if you have dense breast tissue. 3D mammography is an optional exam that anyone can have with a 2D mammogram. It doesn't replace or take the place of a 2D mammogram. 2D mammograms remain an effective screening test for all women.  Not all insurance companies cover the cost of a 3D mammogram. Check with your insurance.              Who to contact     If you have questions or need follow up information about today's clinic visit or your schedule please contact [facility] directly at [phone].  Normal or non-critical lab and imaging results will be communicated to you by [employer], letter or phone within 4 business days after the clinic has received the results. If you do not hear from us within 7 days, please contact the clinic through [employer] or phone. If you have a critical or abnormal lab result, we will notify you by phone as soon as possible.  Submit refill requests through [employer] or call your pharmacy and they will forward the refill request to us. Please allow 3 business days for your refill to be completed.          Additional Information About Your Visit        [employer] Information     [employer] gives you secure access to your electronic health record. If you see a primary care provider, you can also send messages to your care team and make appointments. If you have questions, please call your primary care clinic.  If you do not have a primary care provider, please call [phone] and they will assist you.        Care EveryWhere ID     This is your Care EveryWhere ID. This could be used by other organizations to access your [place] medical records  [id number]         Blood Pressure from Last 3 Encounters:   06/01/18 124/76   03/22/18 120/72   01/08/18 118/70    Weight from Last 3 Encounters:   03/22/18 172 lb (78 kg)   01/08/18 176 lb 8 oz (80.1 kg)   03/20/17 172 lb (78 kg)              Today, you had the  following     No orders found for display       Primary Care Provider Office Phone # Fax #    Dariel Weber [phone]       [address] [facility] [address]        Equal Access to Services     SHAYE RANKIN : Hemal veloz ku nitao Sogaudencioali, waaxda luqadaha, qaybta kaalmada adeegyada, leonard halln braulio marcano laalcon motta. So [facility] [phone].    ATENCIÓN: Si habla español, tiene a basurto disposición servicios gratuitos de asistencia lingüística. Llame al [phone].    We comply with applicable federal civil rights laws and [place] laws. We do not discriminate on the basis of race, color, national origin, age, disability, sex, sexual orientation, or gender identity.            Thank you!     Thank you for choosing [facility]  for your care. Our goal is always to provide you with excellent care. Hearing back from our patients is one way we can continue to improve our services. Please take a few minutes to complete the written survey that you may receive in the mail after your visit with us. Thank you!             Your Updated Medication List - Protect others around you: Learn how to safely use, store and throw away your medicines at www.disposemymeds.org.          This list is accurate as of 6/1/18  1:17 PM.  Always use your most recent med list.                   Brand Name Dispense Instructions for use Diagnosis    clopidogrel 75 MG tablet    PLAVIX    90 tablet    Take 1 tablet (75 mg) by mouth daily    History of TIA (transient ischemic attack) and stroke       LORazepam 0.5 MG tablet    ATIVAN    30 tablet    1 tab daily prn anxiety    Anxiety       MULTIVITAMIN TABS   OR      1 tablet daily        pravastatin 80 MG tablet    PRAVACHOL    90 tablet    Take 1 tablet by mouth. at bedtime.    Hyperlipidemia LDL goal <130       vitamin D 2000 units tablet     90 tablet    1 tab  daily    Vitamin D deficiency disease

## 2018-06-21 ENCOUNTER — MYC MEDICAL ADVICE (OUTPATIENT)
Dept: INTERNAL MEDICINE | Facility: CLINIC | Age: 78
End: 2018-06-21

## 2018-06-21 ENCOUNTER — OFFICE VISIT (OUTPATIENT)
Dept: INTERNAL MEDICINE | Facility: CLINIC | Age: 78
End: 2018-06-21
Payer: COMMERCIAL

## 2018-06-21 ENCOUNTER — TELEPHONE (OUTPATIENT)
Dept: INTERNAL MEDICINE | Facility: CLINIC | Age: 78
End: 2018-06-21

## 2018-06-21 VITALS
HEART RATE: 69 BPM | BODY MASS INDEX: 27.5 KG/M2 | DIASTOLIC BLOOD PRESSURE: 70 MMHG | TEMPERATURE: 98.3 F | OXYGEN SATURATION: 97 % | WEIGHT: 174.3 LBS | RESPIRATION RATE: 16 BRPM | SYSTOLIC BLOOD PRESSURE: 115 MMHG

## 2018-06-21 DIAGNOSIS — N63.10 MASS OF RIGHT BREAST: Primary | ICD-10-CM

## 2018-06-21 PROCEDURE — 99213 OFFICE O/P EST LOW 20 MIN: CPT | Performed by: INTERNAL MEDICINE

## 2018-06-21 NOTE — PROGRESS NOTES
SUBJECTIVE:   Liana iGraldo is a 78 year old female who presents to clinic today for the following health issues:      Lump in right breast      Duration: Noticed this morning from some pain    Description (location/character/radiation): right side of breast    Intensity:  No pain    Accompanying signs and symptoms: none    History (similar episodes/previous evaluation): None    Precipitating or alleviating factors: None    Therapies tried and outcome: None           Problem list and histories reviewed & adjusted, as indicated.  Additional history:     For only the last few days, the patient has noted a firm mass in her right breast, just inferior to her right nipple.  No obvious pain, no skin changes, no nipple discharge.    Patient has had a cyst in her right breast, near her areola, and has also undergone unspecified biopsy of tissue in her right breast, results which was apparently benign.  She is up-to-date on mammogram screening.    Reviewed and updated as needed this visit by clinical staff  Tobacco  Allergies  Meds  Problems       Reviewed and updated as needed this visit by Provider  Allergies  Meds  Problems         ROS:  Constitutional, HEENT, cardiovascular, pulmonary, GI, , musculoskeletal, neuro, skin, endocrine and psych systems are negative, except as otherwise noted.    OBJECTIVE:     /70  Pulse 69  Temp 98.3  F (36.8  C) (Oral)  Resp 16  Wt 174 lb 4.8 oz (79.1 kg)  SpO2 97%  BMI 27.5 kg/m2  Body mass index is 27.5 kg/(m^2).  GENERAL: healthy, alert and no distress  NECK: no adenopathy, no asymmetry, masses, or scars and thyroid normal to palpation  RESP: lungs clear to auscultation - no rales, rhonchi or wheezes  BREAST: Right breast - firm, rubbery, mobile mass at least 2 cm wide, just inferior to right areola.  Left breast - normal.  No obvious axillary adenopathy bilaterally  CV: regular rate and rhythm, normal S1 S2, no S3 or S4, no murmur, click or rub, no peripheral  edema and peripheral pulses strong  ABDOMEN: soft, nontender, no hepatosplenomegaly, no masses and bowel sounds normal  MS: no gross musculoskeletal defects noted, no edema        ASSESSMENT/PLAN:     1. Mass of right breast  Will arrange for ultrasound and diagnostic mammogram - patient prefers to have this done at [facility].  - MA Diagnostic Digital Bilateral; Future  - US Breast Right Complete 4 Quadrants; Future        Hermann Gee MD  [facility]

## 2018-06-21 NOTE — TELEPHONE ENCOUNTER
Patient called back. Appt scheduled for 6:45 pm. Denies any fever, redness, discharge. Pain has not been bad.       Guideline used:  Telephone Triage Protocols for Nurses, Fifth Edition, Nadine Preciado RN

## 2018-06-21 NOTE — MR AVS SNAPSHOT
"              After Visit Summary   6/21/2018    Liana Giraldo    MRN: [id number]           Patient Information     Date Of Birth          1940        Visit Information        Provider Department      6/21/2018 6:45 PM Hermann Gee MD [facility]        Today's Diagnoses     Mass of right breast    -  1       Follow-ups after your visit        Your next 10 appointments already scheduled     Jun 27, 2018  3:00 PM CDT   MA DIAGNOSTIC DIGITAL BILATERAL with [id number]   [facility] ([facility])    [address] [facility], [address]  [facility] [id number] [phone]           Do not use any powder, lotion or deodorant under your arms or on your breast. If you do, we will ask you to remove it before your exam.  Wear comfortable, two-piece clothing.  If you have any allergies, tell your care team.  Bring any previous mammograms from other facilities or have them mailed to the breast center.  Three-dimensional (3D) mammograms are available at [place] locations in [facility], [facility], [facility], and [place]. -Health locations include [place] and [facility] in [place]. Benefits of 3D mammograms include: - Improved rate of cancer detection - Decreases your chance of having to go back for more tests, which means fewer: - \"False-positive\" results (This means that there is an abnormal area but it isn't cancer.) - Invasive testing procedures, such as a biopsy or surgery - Can provide clearer images of the breast if you have dense breast tissue. 3D mammography is an optional exam that anyone can have with a 2D mammogram. It doesn't replace or take the place of a 2D mammogram. 2D mammograms remain an effective screening test for all women.  Not all insurance companies cover the cost of a 3D mammogram. Check with your insurance.            Jun 27, 2018  3:30 PM CDT   US BREAST RIGHT CMPL 4 QUAD with " [id number]   [facility] ([facility])    [address]  [facility] [id number] [phone]           Please bring a list of your medicines (including vitamins, minerals and over-the-counter drugs). Also, tell your doctor about any allergies you may have. Wear comfortable clothes and leave your valuables at home.  You do not need to do anything special to prepare for your exam.  Please call the Imaging Department at your exam site with any questions.            Jul 25, 2018 10:15 AM CDT   LAB with [facility]   [facility]    [address] [address] [phone]           Please do not eat 10-12 hours before your appointment if you are coming in fasting for labs on lipids, cholesterol, or glucose (sugar). This does not apply to pregnant women. Water, hot tea and black coffee (with nothing added) are okay. Do not drink other fluids, diet soda or chew gum.            Jul 25, 2018 10:30 AM CDT   MA SCREENING DIGITAL BILATERAL with [id number]   [facility]    [address] [address] [phone]           Do not use any powder, lotion or deodorant under your arms or on your breast. If you do, we will ask you to remove it before your exam.  Wear comfortable, two-piece clothing.  If you have any allergies, tell your care team.  Bring any previous mammograms from other facilities or have them mailed to the breast center. Three-dimensional (3D) mammograms are available at [place] locations in [facility], [facility], [facility], and [place]. -[facility] locations include [place] and [facility] in [place]. Benefits of 3D mammograms include: - Improved rate of cancer detection - Decreases your chance of having to go back for more tests, which means  "fewer: - \"False-positive\" results (This means that there is an abnormal area but it isn't cancer.) - Invasive testing procedures, such as a biopsy or surgery - Can provide clearer images of the breast if you have dense breast tissue. 3D mammography is an optional exam that anyone can have with a 2D mammogram. It doesn't replace or take the place of a 2D mammogram. 2D mammograms remain an effective screening test for all women.  Not all insurance companies cover the cost of a 3D mammogram. Check with your insurance.              Future tests that were ordered for you today     Open Future Orders        Priority Expected Expires Ordered    MA Diagnostic Digital Bilateral Routine  6/21/2019 6/21/2018    US Breast Right Complete 4 Quadrants Routine  6/21/2019 6/21/2018            Who to contact     If you have questions or need follow up information about today's clinic visit or your schedule please contact [facility] directly at [phone].  Normal or non-critical lab and imaging results will be communicated to you by Dragon [place], letter or phone within 4 business days after the clinic has received the results. If you do not hear from us within 7 days, please contact the clinic through PixelFish or phone. If you have a critical or abnormal lab result, we will notify you by phone as soon as possible.  Submit refill requests through PixelFish or call your pharmacy and they will forward the refill request to us. Please allow 3 business days for your refill to be completed.          Additional Information About Your Visit        PixelFish Information     PixelFish gives you secure access to your electronic health record. If you see a primary care provider, you can also send messages to your care team and make appointments. If you have questions, please call your primary care clinic.  If you do not have a primary care provider, please call [phone] and they will assist you.        Care EveryWhere ID     " This is your Care EveryWhere ID. This could be used by other organizations to access your [place] medical records  [id number]        Your Vitals Were     Pulse Temperature Respirations Pulse Oximetry BMI (Body Mass Index)       69 98.3  F (36.8  C) (Oral) 16 97% 27.5 kg/m2        Blood Pressure from Last 3 Encounters:   06/21/18 115/70   06/01/18 124/76   03/22/18 120/72    Weight from Last 3 Encounters:   06/21/18 174 lb 4.8 oz (79.1 kg)   03/22/18 172 lb (78 kg)   01/08/18 176 lb 8 oz (80.1 kg)               Primary Care Provider Office Phone # Fax #    Dariel Weber [phone]       [address] [place] [id number]        Equal Access to Services     SHAYE RANKIN : Hadii magdiel robison hadasho Soomaali, waaxda luqadaha, qaybta kaalmada adeegyada, leonard rivera . So [facility] [phone].    ATENCIÓN: Si habla español, tiene a basurto disposición servicios gratuitos de asistencia lingüística. Melissa al [phone].    We comply with applicable federal civil rights laws and [place] laws. We do not discriminate on the basis of race, color, national origin, age, disability, sex, sexual orientation, or gender identity.            Thank you!     Thank you for choosing [facility]  for your care. Our goal is always to provide you with excellent care. Hearing back from our patients is one way we can continue to improve our services. Please take a few minutes to complete the written survey that you may receive in the mail after your visit with us. Thank you!             Your Updated Medication List - Protect others around you: Learn how to safely use, store and throw away your medicines at www.disposemymeds.org.          This list is accurate as of 6/21/18 11:59 PM.  Always use your most recent med list.                   Brand Name Dispense Instructions for use Diagnosis    clopidogrel 75 MG tablet    PLAVIX    90 tablet    Take 1 tablet (75 mg) by mouth daily     History of TIA (transient ischemic attack) and stroke       LORazepam 0.5 MG tablet    ATIVAN    30 tablet    1 tab daily prn anxiety    Anxiety       MULTIVITAMIN TABS   OR      1 tablet daily        pravastatin 80 MG tablet    PRAVACHOL    90 tablet    Take 1 tablet by mouth. at bedtime.    Hyperlipidemia LDL goal <130       vitamin D 2000 units tablet     90 tablet    1 tab  daily    Vitamin D deficiency disease

## 2018-06-21 NOTE — TELEPHONE ENCOUNTER
Reason for call:  Patient reporting a symptom    Symptom or request: tightness in breast and what felt like a bump.    Duration (how long have symptoms been present):     Have you been treated for this before? No    Additional comments: Patient contacted the breast center in regards to if she needs to be seen at the breast center due to her symptoms- they confirmed she needs to be seen at the breast center/[facility] but they need an order for a diagnostic mammogram and ultrasound. Patient did specifically ask for [facility] location.    Phone Number patient can be reached at:  Home number on file [phone] (home)    Best Time:  ASAP    Can we leave a detailed message on this number:  YES    Call taken on 6/21/2018 at 11:52 AM by Obdulia Concepcion

## 2018-06-27 ENCOUNTER — HOSPITAL ENCOUNTER (OUTPATIENT)
Dept: ULTRASOUND IMAGING | Facility: CLINIC | Age: 78
End: 2018-06-27
Attending: INTERNAL MEDICINE
Payer: COMMERCIAL

## 2018-06-27 ENCOUNTER — HOSPITAL ENCOUNTER (OUTPATIENT)
Dept: MAMMOGRAPHY | Facility: CLINIC | Age: 78
Discharge: HOME | End: 2018-06-27
Attending: INTERNAL MEDICINE | Admitting: INTERNAL MEDICINE
Payer: COMMERCIAL

## 2018-06-27 DIAGNOSIS — N63.10 MASS OF RIGHT BREAST: ICD-10-CM

## 2018-06-27 PROCEDURE — 77066 DX MAMMO INCL CAD BI: CPT

## 2018-06-27 PROCEDURE — 76642 ULTRASOUND BREAST LIMITED: CPT | Mod: RT

## 2018-06-28 ENCOUNTER — HOSPITAL ENCOUNTER (OUTPATIENT)
Dept: MAMMOGRAPHY | Facility: CLINIC | Age: 78
End: 2018-06-28
Attending: INTERNAL MEDICINE
Payer: COMMERCIAL

## 2018-06-28 ENCOUNTER — HOSPITAL ENCOUNTER (OUTPATIENT)
Dept: ULTRASOUND IMAGING | Facility: CLINIC | Age: 78
Discharge: HOME | End: 2018-06-28
Attending: INTERNAL MEDICINE | Admitting: INTERNAL MEDICINE
Payer: COMMERCIAL

## 2018-06-28 DIAGNOSIS — N63.10 MASS OF RIGHT BREAST: ICD-10-CM

## 2018-06-28 PROCEDURE — 27210206 US BREAST BIOPSY CORE NEEDLE RIGHT

## 2018-06-28 PROCEDURE — 40000986 MA POST PROCEDURE RIGHT

## 2018-06-28 PROCEDURE — 88305 TISSUE EXAM BY PATHOLOGIST: CPT | Mod: 26 | Performed by: INTERNAL MEDICINE

## 2018-06-28 PROCEDURE — 88305 TISSUE EXAM BY PATHOLOGIST: CPT | Performed by: INTERNAL MEDICINE

## 2018-06-28 NOTE — DISCHARGE INSTRUCTIONS
After Your Breast Biopsy    Bleeding or bruising: Slight bruising is normal.  If you bleed through the bandage, put direct pressure on the breast.  If you are still bleeding after 20 minutes, call the doctor who ordered the exam.    Bandages: Keep your bandage in place until tomorrow morning.  Do not get it wet.     Activity: You may shower the morning after the exam.  No heavy activity (lifting, vacuuming) for 24 hours.    Discomfort: Wear your bra overnight to support the breast.  You may take Tylenol (acetaminophen) for pain.  If you had a stereotactic of MR-directed biopsy, you may take aspirin or ibuprofen (Advil, Motrin) the morning after your biopsy, unless your doctor tells you not to.    Infection: Infection is rare.  Symptoms include fever, redness, increasing pain and fluid draining from the biopsy site.  If you have any of these symptoms, please call the doctor who ordered your exam.    Results: Results may take up to three business days.  If you have not heard your results in three days, call the Breast Center Nurse at [phone].  In rare cases, we may need to do another biopsy.    Call the doctor who ordered your exam if:    You have bleeding that lasts more than 20 minutes.    You have pain that cannot be controlled.    You have signs of infection (fever, redness, drainage or other signs).    You have not had your results within three days.    Nurse navigator: Our nurse navigator is here to answer your questions and help you set up future clinic visits.  Please call [phone].    Thank you for choosing [facility].  Please call us if you have questions or concerns about your biopsy.

## 2018-06-29 LAB — COPATH REPORT: NORMAL

## 2018-06-29 NOTE — PROGRESS NOTES
Pathology report reviewed with our breast radiologist Dr. Harshad Henriquez. I phoned and informed patient of results showing benign BREAST TISSUE, ACUTE and CHRONIC INFLAMMATION. Recommended follow up is Routine Annual Screening Mammogram, and monthly self breast exams.  I also informed patient if this cystic mass continues to bother her, she could meet with a surgeon to discuss having an excisional biopsy.  Patient states she will just continue to monitor and mention it to Dr. Weber the next time she is seen in office.  Patient states no problems with biopsy site. Questions were answered and my phone number given if she has further questions or concerns.  Informed patient I will route this message on to Dr. Alicia and Dr. Weber to inform.  Patient verbalized understanding of the plan of care.  Ami Cuevas, LIBBYN, RN

## 2018-08-09 DIAGNOSIS — E78.5 HYPERLIPIDEMIA LDL GOAL <130: ICD-10-CM

## 2018-08-09 LAB
CHOLEST SERPL-MCNC: 169 MG/DL
HDLC SERPL-MCNC: 55 MG/DL
LDLC SERPL CALC-MCNC: 91 MG/DL
NONHDLC SERPL-MCNC: 114 MG/DL
TRIGL SERPL-MCNC: 115 MG/DL

## 2018-08-09 PROCEDURE — 36415 COLL VENOUS BLD VENIPUNCTURE: CPT | Performed by: INTERNAL MEDICINE

## 2018-08-09 PROCEDURE — 80061 LIPID PANEL: CPT | Performed by: INTERNAL MEDICINE

## 2018-08-15 ENCOUNTER — MYC MEDICAL ADVICE (OUTPATIENT)
Dept: INTERNAL MEDICINE | Facility: CLINIC | Age: 78
End: 2018-08-15

## 2018-08-15 DIAGNOSIS — E78.5 HYPERLIPIDEMIA LDL GOAL <100: Primary | ICD-10-CM

## 2018-10-11 ENCOUNTER — TRANSFERRED RECORDS (OUTPATIENT)
Dept: HEALTH INFORMATION MANAGEMENT | Facility: CLINIC | Age: 78
End: 2018-10-11

## 2018-11-28 ENCOUNTER — MYC MEDICAL ADVICE (OUTPATIENT)
Dept: INTERNAL MEDICINE | Facility: CLINIC | Age: 78
End: 2018-11-28

## 2018-11-28 NOTE — TELEPHONE ENCOUNTER
Patient asking for triage advise regarding her spouse. Information placed in appropriate chart.     Ora IRELAND RN, BSN, PHN

## 2019-03-30 ASSESSMENT — ACTIVITIES OF DAILY LIVING (ADL): CURRENT_FUNCTION: NO ASSISTANCE NEEDED

## 2019-04-01 NOTE — PROGRESS NOTES
"  SUBJECTIVE:   Liana Giraldo is a 79 year old female who presents for Preventive Visit.  Are you in the first 12 months of your Medicare Part B coverage?  No    Physical Health:  Answers for HPI/ROS submitted by the patient on 3/30/2019   Annual Exam:  In general, how would you rate your overall physical health?: good  Frequency of exercise:: 1 day/week  Do you usually eat at least 4 servings of fruit and vegetables a day, include whole grains & fiber, and avoid regularly eating high fat or \"junk\" foods? : No  Taking medications regularly:: Yes  Medication side effects:: None  Activities of Daily Living: no assistance needed  Home safety: no safety concerns identified  Hearing Impairment:: no hearing concerns  In the past 6 months, have you been bothered by leaking of urine?: Yes  In general, how would you rate your overall mental or emotional health?: good  Duration of exercise:: Less than 15 minutes        Mental Health:    In general, how would you rate your overall mental or emotional health? good  PHQ-2 Score: (P) 0    Do you feel safe in your environment? Yes    Do you have a Health Care Directive? Yes: Advance Directive has been received and scanned.    Additional concerns to address?  No    Fall risk:  Fallen 2 or more times in the past year?: No  Any fall with injury in the past year?: No    Cognitive Screenin) Repeat 3 items (Leader, Season, Table)    2) Clock draw: NORMAL  3) 3 item recall: Recalls 3 objects  Results: 3 items recalled: COGNITIVE IMPAIRMENT LESS LIKELY    Mini-CogTM Copyright BRITT Hassan. Licensed by the author for use in [facility]; reprinted with permission ([email or web address]@.[email or web address]). All rights reserved.      Do you have sleep apnea, excessive snoring or daytime drowsiness?: no             Reviewed and updated as needed this visit by clinical staff         Reviewed and updated as needed this visit by Provider        Social History     Tobacco Use     Smoking status: Never Smoker "     Smokeless tobacco: Never Used   Substance Use Topics     Alcohol use: Yes     Comment: occ                           Current providers sharing in care for this patient include:   Patient Care Team:  Dariel Weber MD as PCP - General  Hermann Gee MD as Assigned PCP    The following health maintenance items are reviewed in Epic and correct as of today:  Health Maintenance   Topic Date Due     ZOSTER IMMUNIZATION (2 of 3) 04/10/2009     MEDICARE ANNUAL WELLNESS VISIT  03/22/2019     FALL RISK ASSESSMENT  03/22/2019     LIPID MONITORING Q1 YEAR  08/09/2019     PHQ-2 Q1 YR  04/02/2020     ADVANCE DIRECTIVE PLANNING Q5 YRS  06/21/2023     DTAP/TDAP/TD IMMUNIZATION (3 - Td) 03/20/2027     DEXA SCAN SCREENING (SYSTEM ASSIGNED)  Completed     IPV IMMUNIZATION  Aged Out     MENINGITIS IMMUNIZATION  Aged Out     Labs reviewed in EPIC      ROS:  CONSTITUTIONAL: NEGATIVE for fever, chills. Weight down 4 pounds  INTEGUMENTARY/SKIN: NEGATIVE for worrisome rashes, moles or lesions  EYES: NEGATIVE for vision changes or irritation. Has glasses. Eye exam  Dec 2018  ENT/MOUTH: NEGATIVE for ear, mouth and throat problems  RESP: NEGATIVE for significant cough or SOB  BREAST: NEGATIVE for  some pain in right breast area that has persisted since bx last June 2018  CV: NEGATIVE for chest pain, palpitations or peripheral edema  GI: NEGATIVE for nausea, abdominal pain,  or change in bowel habits. Rare GERD with certain foods  : NEGATIVE for frequency, dysuria, or hematuria. No vaginal sx  MUSCULOSKELETAL: NEGATIVE for significant arthralgias or myalgia that limits activity. Rare neck stiffness. No sx now  NEURO: NEGATIVE for weakness, dizziness or paresthesias  ENDOCRINE: NEGATIVE for temperature intolerance, skin/hair changes. On statin therapy  HEME: NEGATIVE for bleeding problems  PSYCHIATRIC: NEGATIVE for changes in mood or affect    OBJECTIVE:   /72   Pulse 66   Temp 98  F (36.7  C) (Oral)   Resp 16   Ht 1.651 m  "(5' 5\")   Wt 77.1 kg (170 lb)   SpO2 98%   BMI 28.29 kg/m   Estimated body mass index is 27.5 kg/m  as calculated from the following:    Height as of 1/8/18: 1.695 m (5' 6.75\").    Weight as of 6/21/18: 79.1 kg (174 lb 4.8 oz).  EXAM:   General appearance - healthy, alert, no distress  Skin - No rashes or lesions.  Head - normocephalic, atraumatic  Eyes - RAFI, EOMI, fundi exam with nondilated pupils negative.  Ears - External ears normal. Canals clear. TM's normal.  Nose/Sinuses - Nares normal. Septum midline. Mucosa normal. No drainage or sinus tenderness.  Oropharynx - No erythema, no adenopathy, no exudates.  Neck - Supple without adenopathy or thyromegaly. No bruits.  Lungs - Clear to auscultation without wheezes/rhonchi.  Heart - Regular rate and rhythm without murmurs, clicks, or gallops.  Nodes - No supraclavicular, axillary, or inguinal adenopathy palpable.  Breasts -mild tenderness to palpation right breast at the 10 o'clock position approximately 10 cm from the nipple.  No mass palpable.  No nipple discharge with palpation right breast.  Left breast exam normal.  Abdomen - Abdomen soft, non-tender. BS normal. No masses or hepatosplenomegaly palpable. No bruits.  Extremities -No cyanosis, clubbing or edema.    Musculoskeletal - Spine ROM normal. Muscular strength intact.   Peripheral pulses - radial=4/4, femoral=4/4, posterior tibial=4/4, dorsalis pedis=4/4,  Neuro - Gait normal. Reflexes normal and symmetric. Sensation grossly WNL.  Genital/Rectal - deferred        ASSESSMENT / PLAN:   1. Medicare annual wellness visit, subsequent   See plan discuss below. Other HCM UTD    2. Hyperlipidemia LDL goal <130  Previously controlled.  Continue current medication.  Fasting labs in the next couple days as previously ordered  - pravastatin (PRAVACHOL) 80 MG tablet; Take 1 tablet by mouth. at bedtime.  Dispense: 90 tablet; Refill: 3    3. Breast pain  We will obtain diagnostic mammogram, possible ultrasound.  " "Breast center referral done  - MA Diagnostic Digital Bilateral; Future    End of Life Planning:  Patient currently has an advanced directive: Yes.  Practitioner is supportive of decision.    COUNSELING:  Reviewed preventive health counseling, as reflected in patient instructions    BP Readings from Last 1 Encounters:   06/21/18 115/70     Estimated body mass index is 27.5 kg/m  as calculated from the following:    Height as of 1/8/18: 1.695 m (5' 6.75\").    Weight as of 6/21/18: 79.1 kg (174 lb 4.8 oz).           reports that  has never smoked. she has never used smokeless tobacco.      Appropriate preventive services were discussed with this patient, including applicable screening as appropriate for cardiovascular disease, diabetes, osteopenia/osteoporosis, and glaucoma.  As appropriate for age/gender, discussed screening for colorectal cancer, prostate cancer, breast cancer, and cervical cancer. Checklist reviewing preventive services available has been given to the patient.    Reviewed patients plan of care and provided an AVS. The Basic Care Plan (routine screening as documented in Health Maintenance) for Liana meets the Care Plan requirement. This Care Plan has been established and reviewed with the Patient.    Counseling Resources:  ATP IV Guidelines  Pooled Cohorts Equation Calculator  Breast Cancer Risk Calculator  FRAX Risk Assessment  ICSI Preventive Guidelines  Dietary Guidelines for Americans, 2010  USDA's MyPlate  ASA Prophylaxis  Lung CA Screening      PLAN:  Continue current meds  Prescriptions refilled.    Schedule a future lab appointment  fasting in  the next 1-2 weeks. Call for appt  [facility] to [facility]   Check with insurance or speak with your pharmacist re: Shingrix vaccine coverage for shingles prevention.  This is a 2 shot series done 2-6 months apart  Diagnostic mammogram at [facility] or [facility] [place]. They will call to schedule         Dariel Weber " MD  [facility]

## 2019-04-01 NOTE — PATIENT INSTRUCTIONS
Continue current meds  Prescriptions refilled.    Schedule a future lab appointment  fasting in  the next 1-2 weeks. Call for appt  Bone density test  - [facility]. Call to schedule   Check with insurance or speak with your pharmacist re: Shingrix vaccine coverage for shingles prevention.  This is a 2 shot series done 2-6 months apart  Diagnostic mammogram at [facility] or [facility]. They will call to schedule

## 2019-04-02 ENCOUNTER — OFFICE VISIT (OUTPATIENT)
Dept: INTERNAL MEDICINE | Facility: CLINIC | Age: 79
End: 2019-04-02
Payer: COMMERCIAL

## 2019-04-02 VITALS
TEMPERATURE: 98 F | WEIGHT: 170 LBS | RESPIRATION RATE: 16 BRPM | DIASTOLIC BLOOD PRESSURE: 72 MMHG | HEIGHT: 65 IN | BODY MASS INDEX: 28.32 KG/M2 | OXYGEN SATURATION: 98 % | HEART RATE: 66 BPM | SYSTOLIC BLOOD PRESSURE: 116 MMHG

## 2019-04-02 DIAGNOSIS — Z00.00 MEDICARE ANNUAL WELLNESS VISIT, SUBSEQUENT: Primary | ICD-10-CM

## 2019-04-02 DIAGNOSIS — N64.4 BREAST PAIN: ICD-10-CM

## 2019-04-02 DIAGNOSIS — E78.5 HYPERLIPIDEMIA LDL GOAL <130: ICD-10-CM

## 2019-04-02 PROCEDURE — 99213 OFFICE O/P EST LOW 20 MIN: CPT | Mod: 25 | Performed by: INTERNAL MEDICINE

## 2019-04-02 PROCEDURE — G0439 PPPS, SUBSEQ VISIT: HCPCS | Performed by: INTERNAL MEDICINE

## 2019-04-02 RX ORDER — PRAVASTATIN SODIUM 80 MG/1
TABLET ORAL
Qty: 90 TABLET | Refills: 3 | Status: SHIPPED | OUTPATIENT
Start: 2019-04-02 | End: 2019-12-02

## 2019-04-02 ASSESSMENT — MIFFLIN-ST. JEOR: SCORE: 1251.99

## 2019-04-03 DIAGNOSIS — E78.5 HYPERLIPIDEMIA LDL GOAL <100: ICD-10-CM

## 2019-04-03 LAB
ALBUMIN SERPL-MCNC: 3.7 G/DL (ref 3.4–5)
ALP SERPL-CCNC: 72 U/L (ref 40–150)
ALT SERPL W P-5'-P-CCNC: 20 U/L (ref 0–50)
ANION GAP SERPL CALCULATED.3IONS-SCNC: 4 MMOL/L (ref 3–14)
AST SERPL W P-5'-P-CCNC: 17 U/L (ref 0–45)
BILIRUB SERPL-MCNC: 0.7 MG/DL (ref 0.2–1.3)
BUN SERPL-MCNC: 17 MG/DL (ref 7–30)
CALCIUM SERPL-MCNC: 9.2 MG/DL (ref 8.5–10.1)
CHLORIDE SERPL-SCNC: 108 MMOL/L (ref 94–109)
CHOLEST SERPL-MCNC: 186 MG/DL
CO2 SERPL-SCNC: 30 MMOL/L (ref 20–32)
CREAT SERPL-MCNC: 0.78 MG/DL (ref 0.52–1.04)
GFR SERPL CREATININE-BSD FRML MDRD: 73 ML/MIN/{1.73_M2}
GLUCOSE SERPL-MCNC: 94 MG/DL (ref 70–99)
HDLC SERPL-MCNC: 62 MG/DL
LDLC SERPL CALC-MCNC: 108 MG/DL
NONHDLC SERPL-MCNC: 124 MG/DL
POTASSIUM SERPL-SCNC: 4.7 MMOL/L (ref 3.4–5.3)
PROT SERPL-MCNC: 7.9 G/DL (ref 6.8–8.8)
SODIUM SERPL-SCNC: 140 MMOL/L (ref 133–144)
TRIGL SERPL-MCNC: 78 MG/DL

## 2019-04-03 PROCEDURE — 36415 COLL VENOUS BLD VENIPUNCTURE: CPT | Performed by: INTERNAL MEDICINE

## 2019-04-03 PROCEDURE — 80061 LIPID PANEL: CPT | Performed by: INTERNAL MEDICINE

## 2019-04-03 PROCEDURE — 80053 COMPREHEN METABOLIC PANEL: CPT | Performed by: INTERNAL MEDICINE

## 2019-04-06 ENCOUNTER — MYC MEDICAL ADVICE (OUTPATIENT)
Dept: INTERNAL MEDICINE | Facility: CLINIC | Age: 79
End: 2019-04-06

## 2019-04-06 DIAGNOSIS — E78.5 HYPERLIPIDEMIA LDL GOAL <100: Primary | ICD-10-CM

## 2019-04-09 ENCOUNTER — HOSPITAL ENCOUNTER (OUTPATIENT)
Dept: ULTRASOUND IMAGING | Facility: CLINIC | Age: 79
End: 2019-04-09
Attending: INTERNAL MEDICINE
Payer: COMMERCIAL

## 2019-04-09 ENCOUNTER — HOSPITAL ENCOUNTER (OUTPATIENT)
Dept: MAMMOGRAPHY | Facility: CLINIC | Age: 79
Discharge: HOME | End: 2019-04-09
Attending: INTERNAL MEDICINE | Admitting: INTERNAL MEDICINE
Payer: COMMERCIAL

## 2019-04-09 DIAGNOSIS — N64.4 BREAST PAIN: ICD-10-CM

## 2019-04-09 PROCEDURE — 77066 DX MAMMO INCL CAD BI: CPT

## 2019-04-09 PROCEDURE — G0279 TOMOSYNTHESIS, MAMMO: HCPCS

## 2019-04-09 PROCEDURE — 76642 ULTRASOUND BREAST LIMITED: CPT | Mod: RT

## 2019-06-10 ENCOUNTER — MYC MEDICAL ADVICE (OUTPATIENT)
Dept: INTERNAL MEDICINE | Facility: CLINIC | Age: 79
End: 2019-06-10

## 2019-09-30 ENCOUNTER — HEALTH MAINTENANCE LETTER (OUTPATIENT)
Age: 79
End: 2019-09-30

## 2019-11-05 ENCOUNTER — MYC MEDICAL ADVICE (OUTPATIENT)
Dept: INTERNAL MEDICINE | Facility: CLINIC | Age: 79
End: 2019-11-05

## 2019-11-10 ENCOUNTER — MYC MEDICAL ADVICE (OUTPATIENT)
Dept: INTERNAL MEDICINE | Facility: CLINIC | Age: 79
End: 2019-11-10

## 2019-11-10 DIAGNOSIS — E78.5 HYPERLIPIDEMIA LDL GOAL <100: Primary | ICD-10-CM

## 2019-11-19 DIAGNOSIS — E78.5 HYPERLIPIDEMIA LDL GOAL <100: ICD-10-CM

## 2019-11-19 PROCEDURE — 80061 LIPID PANEL: CPT | Performed by: INTERNAL MEDICINE

## 2019-11-19 PROCEDURE — 80053 COMPREHEN METABOLIC PANEL: CPT | Performed by: INTERNAL MEDICINE

## 2019-11-19 PROCEDURE — 36415 COLL VENOUS BLD VENIPUNCTURE: CPT | Performed by: INTERNAL MEDICINE

## 2019-11-20 ENCOUNTER — TELEPHONE (OUTPATIENT)
Dept: INTERNAL MEDICINE | Facility: CLINIC | Age: 79
End: 2019-11-20

## 2019-11-20 LAB
ALBUMIN SERPL-MCNC: 3.8 G/DL (ref 3.4–5)
ALP SERPL-CCNC: 75 U/L (ref 40–150)
ALT SERPL W P-5'-P-CCNC: 18 U/L (ref 0–50)
ANION GAP SERPL CALCULATED.3IONS-SCNC: 7 MMOL/L (ref 3–14)
AST SERPL W P-5'-P-CCNC: 12 U/L (ref 0–45)
BILIRUB SERPL-MCNC: 0.6 MG/DL (ref 0.2–1.3)
BUN SERPL-MCNC: 10 MG/DL (ref 7–30)
CALCIUM SERPL-MCNC: 8.8 MG/DL (ref 8.5–10.1)
CHLORIDE SERPL-SCNC: 108 MMOL/L (ref 94–109)
CHOLEST SERPL-MCNC: 187 MG/DL
CO2 SERPL-SCNC: 26 MMOL/L (ref 20–32)
CREAT SERPL-MCNC: 0.78 MG/DL (ref 0.52–1.04)
GFR SERPL CREATININE-BSD FRML MDRD: 72 ML/MIN/{1.73_M2}
GLUCOSE SERPL-MCNC: 80 MG/DL (ref 70–99)
HDLC SERPL-MCNC: 60 MG/DL
LDLC SERPL CALC-MCNC: 110 MG/DL
NONHDLC SERPL-MCNC: 127 MG/DL
POTASSIUM SERPL-SCNC: 4.3 MMOL/L (ref 3.4–5.3)
PROT SERPL-MCNC: 7.6 G/DL (ref 6.8–8.8)
SODIUM SERPL-SCNC: 141 MMOL/L (ref 133–144)
TRIGL SERPL-MCNC: 86 MG/DL

## 2019-11-20 NOTE — TELEPHONE ENCOUNTER
Reason for Call:  Other call back    Detailed comments: pt wants a call back regarding her labs results. Please call pt back with results. Thanks.    Phone Number Patient can be reached at: Home number on file [phone] (home)    Best Time: anytime    Can we leave a detailed message on this number? yes    Call taken on 11/20/2019 at 11:35 AM by DAYANARA AGUILLON

## 2019-11-21 NOTE — TELEPHONE ENCOUNTER
Pt has appt 12/2/19  To see me  Total Cholesterol, HDL (good) Cholesterol, Non-HDL (bad) cholesterol, Triglycerides, Electrolyte, Glucose/Sugar, Kidney function and Liver lab results were normal.  LDL (bad) Cholesterol lab results were abnormal.   Continue meds for now  See me as scheduled and will discuss recommendations further at that time

## 2019-12-02 ENCOUNTER — OFFICE VISIT (OUTPATIENT)
Dept: INTERNAL MEDICINE | Facility: CLINIC | Age: 79
End: 2019-12-02
Payer: COMMERCIAL

## 2019-12-02 VITALS
TEMPERATURE: 98.2 F | WEIGHT: 170 LBS | HEART RATE: 74 BPM | SYSTOLIC BLOOD PRESSURE: 118 MMHG | BODY MASS INDEX: 28.29 KG/M2 | DIASTOLIC BLOOD PRESSURE: 70 MMHG | OXYGEN SATURATION: 97 % | RESPIRATION RATE: 16 BRPM

## 2019-12-02 DIAGNOSIS — E78.5 HYPERLIPIDEMIA LDL GOAL <130: ICD-10-CM

## 2019-12-02 DIAGNOSIS — M25.561 CHRONIC PAIN OF RIGHT KNEE: ICD-10-CM

## 2019-12-02 DIAGNOSIS — G89.29 CHRONIC PAIN OF RIGHT KNEE: ICD-10-CM

## 2019-12-02 DIAGNOSIS — Z86.73 HISTORY OF TIA (TRANSIENT ISCHEMIC ATTACK) AND STROKE: ICD-10-CM

## 2019-12-02 DIAGNOSIS — E55.9 VITAMIN D DEFICIENCY DISEASE: ICD-10-CM

## 2019-12-02 PROCEDURE — 99207 C PAF COMPLETED  NO CHARGE: CPT | Mod: 25 | Performed by: INTERNAL MEDICINE

## 2019-12-02 PROCEDURE — 99214 OFFICE O/P EST MOD 30 MIN: CPT | Performed by: INTERNAL MEDICINE

## 2019-12-02 RX ORDER — CHOLECALCIFEROL (VITAMIN D3) 50 MCG
TABLET ORAL
Qty: 90 TABLET | Refills: 3 | Status: SHIPPED | OUTPATIENT
Start: 2019-12-02

## 2019-12-02 RX ORDER — PRAVASTATIN SODIUM 80 MG/1
TABLET ORAL
Qty: 90 TABLET | Refills: 3 | Status: SHIPPED | OUTPATIENT
Start: 2019-12-02 | End: 2020-10-20

## 2019-12-02 NOTE — PROGRESS NOTES
"Subjective     Liana Giraldo is a 79 year old female who presents to clinic today for the following health issues:    HPI   Chief Complaint   Patient presents with     Discuss     Patient wants to discuss current health status     Pt's past medical history, family history, habits, medications and allergies were reviewed with the patient today.  See snap shot for  HCM status. Most recent lab results reviewed with pt. Problem list and histories reviewed & adjusted, as indicated.  Additional history as below:    Denies chest pain, shortness of breath, abdominal pain, headache, vision changes or side effects with medications.  Diet a little worse recently after 's death 6 mos ago and eating out more with friends  Chronic right  knee pain with previous injection in distant past that helped.     Hx previous TIA. Not taking ASA recently. No recurrent neuro sx     Lab Results   Component Value Date    GLC 80 11/19/2019     No results found for: A1C  Lab Results   Component Value Date    CHOL 187 11/19/2019     Lab Results   Component Value Date     11/19/2019     Lab Results   Component Value Date    HDL 60 11/19/2019     Lab Results   Component Value Date    TRIG 86 11/19/2019     Lab Results   Component Value Date    CR 0.78 11/19/2019     Lab Results   Component Value Date    ALT 18 11/19/2019     Lab Results   Component Value Date    AST 12 11/19/2019     No results found for: MICROL  Lab Results   Component Value Date    TSH 2.26 03/22/2018          Additional ROS:   Constitutional, HEENT, Cardiovascular, Pulmonary, GI and , Neuro, MSK and Psych review of systems/symptoms are otherwise negative or unchanged from previous, except as noted above.      OBJECTIVE:  /70   Pulse 74   Temp 98.2  F (36.8  C) (Temporal)   Resp 16   Wt 77.1 kg (170 lb)   SpO2 97%   BMI 28.29 kg/m     Estimated body mass index is 28.29 kg/m  as calculated from the following:    Height as of 4/2/19: 1.651 m (5' 5\").    " Weight as of this encounter: 77.1 kg (170 lb).     Pulm: Lungs clear to auscultation   CV: Regular rates and rhythm  GI: Soft, nontender, Normal active bowel sounds, No hepatosplenomegaly or masses palpable  Ext: Peripheral pulses intact. No edema. Tenderness to palpation right knee joint line.  Ligament exam grossly intac. NO erythema. Minimal effusion  Neuro: Normal strength and tone, sensory exam grossly normal    Assessment/Plan: (See plan discussion below for further details)  1. Hyperlipidemia LDL goal <130  Controlled. Continue med. Repeat lab 1 year  - pravastatin (PRAVACHOL) 80 MG tablet; Take 1 tablet by mouth. at bedtime.  Dispense: 90 tablet; Refill: 3  - Lipid panel reflex to direct LDL Fasting; Future  - Comprehensive metabolic panel; Future       2. Vitamin D deficiency disease  Controlled with med. RF done. Lab monitoring UTD  - vitamin D3 (CHOLECALCIFEROL) 2000 units (50 mcg) tablet; 1 tab  daily  Dispense: 90 tablet; Refill: 3    3. Chronic pain of right knee   Hx DJD. Pt to see ortho for possible steroid injection/other    4. History of TIA (transient ischemic attack) and stroke   No sx now. Restart ASA  - aspirin (ASA) 81 MG tablet; Take 1 tablet (81 mg) by mouth daily    Plan discussion:  Continue current meds  Prescriptions refilled on file.     Aspirin 81mg daily for stroke protection  Call  [phone] or use [employer] to schedule a future lab appointment  fasting in 1 year.   For fasting labs, please refrain from eating for 8 hours or more.   Drink 2 glasses of water before your lab appointment. It is fine to take your  oral medications on the morning of the lab test as usual  Appt with [facility] re: right knee and possible knee injection          Dariel Weber MD  Internal Medicine Department  [facility]    (Chart documentation was completed, in part, with [employer] voice-recognition software. Even though reviewed, some grammatical, spelling, and word errors may remain.)

## 2019-12-02 NOTE — PATIENT INSTRUCTIONS
Continue current meds  Prescriptions refilled on file.     Aspirin 81mg daily for stroke protection  Call  [phone] or use [employer] to schedule a future lab appointment  fasting in 1 year.   For fasting labs, please refrain from eating for 8 hours or more.   Drink 2 glasses of water before your lab appointment. It is fine to take your  oral medications on the morning of the lab test as usual   Icing right  Hip bursa as needed. Stretching in AM   Appt with [facility] re: right knee and possible knee injection  Possible future physical therapy if  right back and bursa not improving  with better posture after knee injection

## 2019-12-05 ENCOUNTER — OFFICE VISIT (OUTPATIENT)
Dept: URGENT CARE | Facility: URGENT CARE | Age: 79
End: 2019-12-05
Payer: COMMERCIAL

## 2019-12-05 VITALS
DIASTOLIC BLOOD PRESSURE: 72 MMHG | TEMPERATURE: 98.1 F | OXYGEN SATURATION: 98 % | BODY MASS INDEX: 28.29 KG/M2 | RESPIRATION RATE: 20 BRPM | SYSTOLIC BLOOD PRESSURE: 120 MMHG | HEART RATE: 78 BPM | WEIGHT: 170 LBS

## 2019-12-05 DIAGNOSIS — H92.01 OTALGIA, RIGHT: Primary | ICD-10-CM

## 2019-12-05 DIAGNOSIS — M79.12 STERNOCLEIDOMASTOID MUSCLE TENDERNESS: ICD-10-CM

## 2019-12-05 PROCEDURE — 99213 OFFICE O/P EST LOW 20 MIN: CPT | Performed by: FAMILY MEDICINE

## 2019-12-05 RX ORDER — CYCLOBENZAPRINE HCL 5 MG
5 TABLET ORAL EVERY 8 HOURS PRN
Qty: 30 TABLET | Refills: 0 | Status: SHIPPED | OUTPATIENT
Start: 2019-12-05 | End: 2020-02-21

## 2019-12-05 NOTE — PROGRESS NOTES
SUBJECTIVE:   Liana Giraldo is a 79 year old female presenting with a chief complaint of right sided ear pain.  Intermittent pain, will shoot up.  Endorsed more stiff neck and headache, hurts to turn her head.  No fever.  More stress  Onset of symptoms was 4 day(s) ago.  Course of illness is worsening.    Severity moderate  Current and Associated symptoms: right sided ear pain  Treatment measures tried include Fluids, Rest and aleve, aspirin.  Predisposing factors include None.    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     Osteopenia      Other and unspecified hyperlipidemia      Personal history of colonic polyps 8/05    Hyperplastic     Rotator cuff (capsule) sprain 12/01     right with 3mm supraspinatous tendon tear     Synovial cyst of popliteal space 7/30/03    right     TIA (transient ischemic attack) 2/25/16     Vitamin D deficiency disease 2/23/2013     Current Outpatient Medications   Medication Sig Dispense Refill     aspirin (ASA) 81 MG tablet Take 1 tablet (81 mg) by mouth daily       pravastatin (PRAVACHOL) 80 MG tablet Take 1 tablet by mouth. at bedtime. 90 tablet 3     vitamin D3 (CHOLECALCIFEROL) 2000 units (50 mcg) tablet 1 tab  daily 90 tablet 3     Social History     Tobacco Use     Smoking status: Never Smoker     Smokeless tobacco: Never Used   Substance Use Topics     Alcohol use: Yes     Comment: occ       ROS:  Review of systems negative except as stated above.    OBJECTIVE:  /72   Pulse 78   Temp 98.1  F (36.7  C) (Tympanic)   Resp 20   Wt 77.1 kg (170 lb)   SpO2 98%   BMI 28.29 kg/m    GENERAL APPEARANCE: healthy, alert and no distress  EYES: EOMI,  PERRL, conjunctiva clear  HENT: ear canals and TM's normal, mild cerumen  Nose and mouth without ulcers, erythema or lesions  NECK: supple, mild tenderness on  right sternocleidomastoid muscle right, no lymphadenopathy  RESP: lungs clear to auscultation - no rales, rhonchi or wheezes  CV: regular rates and rhythm, normal S1 S2, no murmur noted  SKIN: no suspicious lesions or rashes  PSYCH: mentation appears normal and affect normal/bright    ASSESSMENT/PLAN:  (H92.01) Otalgia, right  (primary encounter diagnosis)  Plan: sudafed, symptomatic treatment    (M79.12) Sternocleidomastoid muscle tenderness  Comment: spasm  Plan: cyclobenzaprine (FLEXERIL) 5 MG tablet            Reassurance given, reviewed symptomatic treatment with tylenol, NSAIDs, heat to area of discomfort.  Discussed that does not have acute ear infection, ear pain may be due to eustachian tube dysfunction and okay to try sudafed if needed.  RX flexeril given to help with muscle spasm/tightness in neck area.    Follow up with primary provider if no resolution of symptoms in 1 week    Roland Allen MD, MD  December 5, 2019 9:37 AM

## 2019-12-05 NOTE — PATIENT INSTRUCTIONS
Okay for tylenol for discomfort  Okay for aleve for discomfort  Consider sudafed to help with eustachian tube dysfunction    Okay to take flexeril to help with muscle tightness/spasm.          Patient Education     Your Neck Muscles  The muscles in the neck and shoulders need to be strong to hold the neck and head in place. These muscles also help move the neck and shoulders. Your healthcare provider can recommend exercises to help stretch and strengthen your neck muscles.    Date Last Reviewed: 5/1/2018 2000-2018 [employer]. [address] [place] [address], [place] [address]. All rights reserved. This information is not intended as a substitute for professional medical care. Always follow your healthcare professional's instructions.            foreign body in right nare

## 2020-02-19 DIAGNOSIS — M79.12 STERNOCLEIDOMASTOID MUSCLE TENDERNESS: ICD-10-CM

## 2020-02-19 NOTE — TELEPHONE ENCOUNTER
Routing refill request to provider for review/approval because:  Drug not on the [facility] refill protocol     Last Written Prescription Date:  12/5/19  Last Fill Quantity: 30,  # refills: 0   Last office visit: 12/5/2019 with prescribing provider:  Prescribed at Urgent Care. Routed to PCP to review   Future Office Visit:      Requested Prescriptions   Pending Prescriptions Disp Refills     CYCLOBENZAPRINE 5 MG PO tablet [Pharmacy Med Name: Cyclobenzaprine HCl Oral Tablet 5 MG] 30 tablet 0     Sig: Take 1 tablet (5 mg) by mouth every 8 hours as needed for muscle spasms       There is no refill protocol information for this order        LIBBY LazaroN, RN

## 2020-02-21 RX ORDER — CYCLOBENZAPRINE HCL 5 MG
5 TABLET ORAL EVERY 8 HOURS PRN
Qty: 30 TABLET | Refills: 0 | Status: SHIPPED | OUTPATIENT
Start: 2020-02-21 | End: 2020-10-20

## 2020-03-11 ENCOUNTER — TRANSFERRED RECORDS (OUTPATIENT)
Dept: HEALTH INFORMATION MANAGEMENT | Facility: CLINIC | Age: 80
End: 2020-03-11

## 2020-03-24 ENCOUNTER — TELEPHONE (OUTPATIENT)
Dept: INTERNAL MEDICINE | Facility: CLINIC | Age: 80
End: 2020-03-24

## 2020-03-24 NOTE — TELEPHONE ENCOUNTER
Reason for Call:  Patient request    Detailed comments: Patient is requesting that you call her back to inform her of the missed message left on her [employer] accountl, she has changed computers since the last time she communicated with you.    Phone Number Patient can be reached at: Home number on file [phone] (home)    Best Time: Anytime    Can we leave a detailed message on this number? YES    Call taken on 3/24/2020 at 1:11 PM by Kan Claudio

## 2020-03-24 NOTE — TELEPHONE ENCOUNTER
Called pt and there is no messages. Pt is good for all things and not due. Pt will wait until around July to call for her annual.

## 2020-07-02 ENCOUNTER — HOSPITAL ENCOUNTER (OUTPATIENT)
Dept: MAMMOGRAPHY | Facility: CLINIC | Age: 80
Discharge: HOME | End: 2020-07-02
Attending: INTERNAL MEDICINE | Admitting: INTERNAL MEDICINE
Payer: COMMERCIAL

## 2020-07-02 DIAGNOSIS — Z12.31 VISIT FOR SCREENING MAMMOGRAM: ICD-10-CM

## 2020-07-02 PROCEDURE — 77067 SCR MAMMO BI INCL CAD: CPT

## 2020-09-24 ENCOUNTER — TELEPHONE (OUTPATIENT)
Dept: INTERNAL MEDICINE | Facility: CLINIC | Age: 80
End: 2020-09-24

## 2020-09-24 NOTE — TELEPHONE ENCOUNTER
Patient Quality Outreach      Summary:    Patient is due/failing the following:   Annual wellness, date due: 04/02/2020 and Immunizations    Type of outreach:    Sent [employer] message.    Questions for provider review:    None                                                                                   **Start Working phrase here:**       Patient has the following on her problem list/HM:   Immunizations     No immunizations due                                                    Yanique Dey CMA       Chart routed to jordin

## 2020-10-20 ENCOUNTER — OFFICE VISIT (OUTPATIENT)
Dept: INTERNAL MEDICINE | Facility: CLINIC | Age: 80
End: 2020-10-20
Payer: COMMERCIAL

## 2020-10-20 VITALS
DIASTOLIC BLOOD PRESSURE: 70 MMHG | OXYGEN SATURATION: 97 % | TEMPERATURE: 97.4 F | RESPIRATION RATE: 16 BRPM | BODY MASS INDEX: 27.32 KG/M2 | SYSTOLIC BLOOD PRESSURE: 122 MMHG | WEIGHT: 164 LBS | HEIGHT: 65 IN | HEART RATE: 59 BPM

## 2020-10-20 DIAGNOSIS — Z00.00 MEDICARE ANNUAL WELLNESS VISIT, SUBSEQUENT: Primary | ICD-10-CM

## 2020-10-20 DIAGNOSIS — E78.5 HYPERLIPIDEMIA LDL GOAL <100: ICD-10-CM

## 2020-10-20 DIAGNOSIS — Z78.0 ASYMPTOMATIC MENOPAUSAL STATE: ICD-10-CM

## 2020-10-20 DIAGNOSIS — L65.9 HAIR LOSS: ICD-10-CM

## 2020-10-20 LAB
ALBUMIN SERPL-MCNC: 3.8 G/DL (ref 3.4–5)
ALP SERPL-CCNC: 67 U/L (ref 40–150)
ALT SERPL W P-5'-P-CCNC: 17 U/L (ref 0–50)
ANION GAP SERPL CALCULATED.3IONS-SCNC: 2 MMOL/L (ref 3–14)
AST SERPL W P-5'-P-CCNC: 15 U/L (ref 0–45)
BILIRUB SERPL-MCNC: 0.7 MG/DL (ref 0.2–1.3)
BUN SERPL-MCNC: 16 MG/DL (ref 7–30)
CALCIUM SERPL-MCNC: 9.2 MG/DL (ref 8.5–10.1)
CHLORIDE SERPL-SCNC: 108 MMOL/L (ref 94–109)
CHOLEST SERPL-MCNC: 191 MG/DL
CO2 SERPL-SCNC: 30 MMOL/L (ref 20–32)
CREAT SERPL-MCNC: 0.8 MG/DL (ref 0.52–1.04)
ERYTHROCYTE [DISTWIDTH] IN BLOOD BY AUTOMATED COUNT: 13 % (ref 10–15)
GFR SERPL CREATININE-BSD FRML MDRD: 70 ML/MIN/{1.73_M2}
GLUCOSE SERPL-MCNC: 94 MG/DL (ref 70–99)
HCT VFR BLD AUTO: 40.7 % (ref 35–47)
HDLC SERPL-MCNC: 65 MG/DL
HGB BLD-MCNC: 13.3 G/DL (ref 11.7–15.7)
LDLC SERPL CALC-MCNC: 107 MG/DL
MCH RBC QN AUTO: 30.9 PG (ref 26.5–33)
MCHC RBC AUTO-ENTMCNC: 32.7 G/DL (ref 31.5–36.5)
MCV RBC AUTO: 95 FL (ref 78–100)
NONHDLC SERPL-MCNC: 126 MG/DL
PLATELET # BLD AUTO: 259 10E9/L (ref 150–450)
POTASSIUM SERPL-SCNC: 4.6 MMOL/L (ref 3.4–5.3)
PROT SERPL-MCNC: 7.9 G/DL (ref 6.8–8.8)
RBC # BLD AUTO: 4.3 10E12/L (ref 3.8–5.2)
SODIUM SERPL-SCNC: 140 MMOL/L (ref 133–144)
TRIGL SERPL-MCNC: 97 MG/DL
TSH SERPL DL<=0.005 MIU/L-ACNC: 2.08 MU/L (ref 0.4–4)
WBC # BLD AUTO: 5 10E9/L (ref 4–11)

## 2020-10-20 PROCEDURE — 99397 PER PM REEVAL EST PAT 65+ YR: CPT | Performed by: INTERNAL MEDICINE

## 2020-10-20 PROCEDURE — 36415 COLL VENOUS BLD VENIPUNCTURE: CPT | Performed by: INTERNAL MEDICINE

## 2020-10-20 PROCEDURE — 80061 LIPID PANEL: CPT | Performed by: INTERNAL MEDICINE

## 2020-10-20 PROCEDURE — 99213 OFFICE O/P EST LOW 20 MIN: CPT | Mod: 25 | Performed by: INTERNAL MEDICINE

## 2020-10-20 PROCEDURE — 85027 COMPLETE CBC AUTOMATED: CPT | Performed by: INTERNAL MEDICINE

## 2020-10-20 PROCEDURE — 84443 ASSAY THYROID STIM HORMONE: CPT | Performed by: INTERNAL MEDICINE

## 2020-10-20 PROCEDURE — 80053 COMPREHEN METABOLIC PANEL: CPT | Performed by: INTERNAL MEDICINE

## 2020-10-20 RX ORDER — PRAVASTATIN SODIUM 80 MG/1
TABLET ORAL
Qty: 90 TABLET | Refills: 3 | Status: SHIPPED | OUTPATIENT
Start: 2020-10-20 | End: 2021-11-17

## 2020-10-20 ASSESSMENT — MIFFLIN-ST. JEOR: SCORE: 1211.84

## 2020-10-20 ASSESSMENT — ACTIVITIES OF DAILY LIVING (ADL): CURRENT_FUNCTION: NO ASSISTANCE NEEDED

## 2020-10-20 NOTE — PATIENT INSTRUCTIONS
Continue current meds  Prescriptions refilled on file    Labs today as ordered  Check with insurance or speak with your pharmacist re: Shingrix vaccine coverage for shingles prevention.  This is a 2 shot series done 2-6 months apart  I would recommend you receive an influenza (flu) vaccine  this Fall (October/early  November)  Bone density test - [facility] or [facility]  Pt was informed regarding extra E&M billing for management of new or established medical issues not related to today's wellness visit

## 2020-10-20 NOTE — PROGRESS NOTES
"SUBJECTIVE:   Liana Giraldo is a 80 year old female who presents for Preventive Visit and follow-up re: hyperlipidemia    Are you in the first 12 months of your Medicare coverage?  No    Healthy Habits:     In general, how would you rate your overall health?  Good    Frequency of exercise:  2-3 days/week    Duration of exercise:  Less than 15 minutes    Do you usually eat at least 4 servings of fruit and vegetables a day, include whole grains    & fiber and avoid regularly eating high fat or \"junk\" foods?  Yes    Taking medications regularly:  Yes    Medication side effects:  None    Ability to successfully perform activities of daily living:  No assistance needed    Home Safety:  No safety concerns identified    Hearing Impairment:  No hearing concerns    In the past 6 months, have you been bothered by leaking of urine? Yes    In general, how would you rate your overall mental or emotional health?  Good      PHQ-2 Total Score: 0    Additional concerns today:  No    Do you feel safe in your environment? Yes    Have you ever done Advance Care Planning? (For example, a Health Directive, POLST, or a discussion with a medical provider or your loved ones about your wishes): Yes, advance care planning is on file.      Fall risk  Fallen 2 or more times in the past year?: No  Any fall with injury in the past year?: No    Cognitive Screening PATIENT DECLINED*    Do you have sleep apnea, excessive snoring or daytime drowsiness?: no    Reviewed and updated as needed this visit by clinical staff  Tobacco  Allergies  Meds              Reviewed and updated as needed this visit by Provider                Social History     Tobacco Use     Smoking status: Never Smoker     Smokeless tobacco: Never Used   Substance Use Topics     Alcohol use: Yes     Comment: occ     If you drink alcohol do you typically have >3 drinks per day or >7 drinks per week? No    Alcohol Use 10/20/2020   Prescreen: >3 drinks/day or >7 drinks/week? No "   Prescreen: >3 drinks/day or >7 drinks/week? -         Current providers sharing in care for this patient include:   Patient Care Team:  Dariel Weber MD as PCP - General  Dariel Weber MD as Assigned PCP    The following health maintenance items are reviewed in Epic and correct as of today:  Health Maintenance   Topic Date Due     ZOSTER IMMUNIZATION (2 of 3) 04/10/2009     MEDICARE ANNUAL WELLNESS VISIT  04/02/2020     FALL RISK ASSESSMENT  04/02/2020     LIPID  11/19/2020     ADVANCE CARE PLANNING  06/21/2023     DTAP/TDAP/TD IMMUNIZATION (3 - Td) 03/20/2027     DEXA  Completed     PHQ-2  Completed     Pneumococcal Vaccine: 65+ Years  Completed     Pneumococcal Vaccine: Pediatrics (0 to 5 Years) and At-Risk Patients (6 to 64 Years)  Aged Out     IPV IMMUNIZATION  Aged Out     MENINGITIS IMMUNIZATION  Aged Out     HEPATITIS B IMMUNIZATION  Aged Out     INFLUENZA VACCINE  Discontinued     Labs reviewed in EPIC      Component      Latest Ref Rng & Units 11/19/2019   Sodium      133 - 144 mmol/L 141   Potassium      3.4 - 5.3 mmol/L 4.3   Chloride      94 - 109 mmol/L 108   Carbon Dioxide      20 - 32 mmol/L 26   Anion Gap      3 - 14 mmol/L 7   Glucose      70 - 99 mg/dL 80   Urea Nitrogen      7 - 30 mg/dL 10   Creatinine      0.52 - 1.04 mg/dL 0.78   GFR Estimate      >60 mL/min/1.73:m2 72   GFR Estimate If Black      >60 mL/min/1.73:m2 84   Calcium      8.5 - 10.1 mg/dL 8.8   Bilirubin Total      0.2 - 1.3 mg/dL 0.6   Albumin      3.4 - 5.0 g/dL 3.8   Protein Total      6.8 - 8.8 g/dL 7.6   Alkaline Phosphatase      40 - 150 U/L 75   ALT      0 - 50 U/L 18   AST      0 - 45 U/L 12   Cholesterol      <200 mg/dL 187   Triglycerides      <150 mg/dL 86   HDL Cholesterol      >49 mg/dL 60   LDL Cholesterol Calculated      <100 mg/dL 110 (H)   Non HDL Cholesterol      <130 mg/dL 127           Review of Systems  CONSTITUTIONAL: NEGATIVE for fever, chills. Weight down 6 pounds  INTEGUMENTARY/SKIN: NEGATIVE for worrisome  "rashes, moles or lesions. Some thinning of hair on scalp  EYES: NEGATIVE for vision changes or irritation. Has glasses  ENT/MOUTH: NEGATIVE for ear, mouth and throat problems  RESP: NEGATIVE for significant cough or SOB  BREAST: NEGATIVE for masses, tenderness or discharge  CV: NEGATIVE for chest pain, palpitations or peripheral edema  GI: NEGATIVE for nausea, abdominal pain, heartburn, or change in bowel habits  : NEGATIVE for frequency, dysuria, or hematuria  MUSCULOSKELETAL: NEGATIVE for significant arthralgias or myalgia that stop activity. Occ right knee soreness. Has had previous cortisone injections. No buckling. No falls  NEURO: NEGATIVE for weakness, dizziness or paresthesias  ENDOCRINE: NEGATIVE for temperature intolerance. On statin therapy. Due for f/u DEXA. Hx osteopenia  HEME: NEGATIVE for bleeding problems  PSYCHIATRIC: NEGATIVE for changes in mood or affect    OBJECTIVE:   /70   Pulse 59   Temp 97.4  F (36.3  C) (Temporal)   Resp 16   Ht 1.638 m (5' 4.5\")   Wt 74.4 kg (164 lb)   SpO2 97%   BMI 27.72 kg/m   Estimated body mass index is 27.72 kg/m  as calculated from the following:    Height as of this encounter: 1.638 m (5' 4.5\").    Weight as of this encounter: 74.4 kg (164 lb).  Physical Exam  General appearance - healthy, alert, no distress  Skin - No rashes or lesions. Multiple seborrheic keratosis on back.   Slight general  thinning of hair of scalp. No rash. No circular alopecia patches  Head - normocephalic, atraumatic  Eyes - RAFI, EOMI, fundi exam with nondilated pupils negative.  Ears - External ears normal. Canals clear. TM's normal.  Nose/Sinuses - Nares normal. Septum midline. Mucosa normal. No drainage or sinus tenderness.  Oropharynx - No erythema, no adenopathy, no exudates.  Neck - Supple without adenopathy or thyromegaly. No bruits.  Lungs - Clear to auscultation without wheezes/rhonchi.  Heart - Regular rate and rhythm without murmurs, clicks, or gallops.  Nodes - No " "supraclavicular, axillary, or inguinal adenopathy palpable.  Breasts - deferred  Abdomen - Abdomen soft, non-tender. BS normal. No masses or hepatosplenomegaly palpable. No bruits.  Extremities -No cyanosis, clubbing or edema.    Musculoskeletal - Spine ROM normal. Muscular strength intact.   Peripheral pulses - radial=4/4, femoral=4/4, posterior tibial=4/4, dorsalis pedis=4/4,  Neuro - Gait normal. Reflexes normal and symmetric. Sensation grossly WNL.  Genital/Rectal - deferred      ASSESSMENT / PLAN:   1. Medicare annual wellness visit, subsequent  See below for healthcare maintenance discussion.  Otherwise up-to-date    2. Hyperlipidemia LDL goal <100  Previously controlled.  Continue statin therapy pending lab results today  - pravastatin (PRAVACHOL) 80 MG tablet; Take 1 tablet by mouth. at bedtime.  Dispense: 90 tablet; Refill: 3  - Comprehensive metabolic panel  - Lipid panel reflex to direct LDL Fasting    3. Asymptomatic menopausal state   Due for bone density screening  - DX Hip/Pelvis/Spine; Future    4. Hair loss  No scalp rash.  Labs as ordered.  If normal, possible Rogaine versus dermatology referral  - TSH with free T4 reflex  - CBC with platelets      Patient has been advised of split billing requirements and indicates understanding: Yes       COUNSELING:  Reviewed preventive health counseling, as reflected in patient instructions    Estimated body mass index is 27.72 kg/m  as calculated from the following:    Height as of this encounter: 1.638 m (5' 4.5\").    Weight as of this encounter: 74.4 kg (164 lb).        She reports that she has never smoked. She has never used smokeless tobacco.      Appropriate preventive services were discussed with this patient, including applicable screening as appropriate for cardiovascular disease, diabetes, osteopenia/osteoporosis, and glaucoma.  As appropriate for age/gender, discussed screening for colorectal cancer, prostate cancer, breast cancer, and cervical " cancer. Checklist reviewing preventive services available has been given to the patient.    Reviewed patients plan of care and provided an AVS. The Basic Care Plan (routine screening as documented in Health Maintenance) for Liana meets the Care Plan requirement. This Care Plan has been established and reviewed with the Patient.    Counseling Resources:  ATP IV Guidelines  Pooled Cohorts Equation Calculator  Breast Cancer Risk Calculator  Breast Cancer: Medication to Reduce Risk  FRAX Risk Assessment  ICSI Preventive Guidelines  Dietary Guidelines for Americans, 2010  USDA's MyPlate  ASA Prophylaxis  Lung CA Screening      PLAN:  Continue current meds  Prescriptions refilled.    Labs today as ordered  Check with insurance or speak with your pharmacist re: Shingrix vaccine coverage for shingles prevention.  This is a 2 shot series done 2-6 months apart  I would recommend you receive an influenza (flu) vaccine  this Fall (October/November). Pt declines to receive today  B[facility] density test - [facility] or [facility]  Pt was informed regarding extra E&M billing for management of new or established medical issues not related to today's wellness visit    Dariel Weber MD  [facility]

## 2020-10-25 PROBLEM — L65.9 HAIR LOSS: Status: ACTIVE | Noted: 2020-10-25

## 2020-11-19 ENCOUNTER — TRANSFERRED RECORDS (OUTPATIENT)
Dept: HEALTH INFORMATION MANAGEMENT | Facility: CLINIC | Age: 80
End: 2020-11-19

## 2020-12-04 ENCOUNTER — OFFICE VISIT (OUTPATIENT)
Dept: INTERNAL MEDICINE | Facility: CLINIC | Age: 80
End: 2020-12-04
Payer: COMMERCIAL

## 2020-12-04 VITALS
TEMPERATURE: 98.9 F | BODY MASS INDEX: 27.72 KG/M2 | OXYGEN SATURATION: 100 % | WEIGHT: 164 LBS | DIASTOLIC BLOOD PRESSURE: 76 MMHG | HEART RATE: 88 BPM | SYSTOLIC BLOOD PRESSURE: 124 MMHG

## 2020-12-04 DIAGNOSIS — R31.0 GROSS HEMATURIA: ICD-10-CM

## 2020-12-04 DIAGNOSIS — N30.01 ACUTE CYSTITIS WITH HEMATURIA: Primary | ICD-10-CM

## 2020-12-04 DIAGNOSIS — R35.0 URINARY FREQUENCY: ICD-10-CM

## 2020-12-04 LAB
RBC #/AREA URNS AUTO: >100 /HPF
URNS CMNT MICRO: ABNORMAL
WBC #/AREA URNS AUTO: ABNORMAL /HPF

## 2020-12-04 PROCEDURE — 81015 MICROSCOPIC EXAM OF URINE: CPT | Performed by: PHYSICIAN ASSISTANT

## 2020-12-04 PROCEDURE — 99213 OFFICE O/P EST LOW 20 MIN: CPT | Performed by: PHYSICIAN ASSISTANT

## 2020-12-04 PROCEDURE — 87088 URINE BACTERIA CULTURE: CPT | Performed by: PHYSICIAN ASSISTANT

## 2020-12-04 PROCEDURE — 87186 SC STD MICRODIL/AGAR DIL: CPT | Performed by: PHYSICIAN ASSISTANT

## 2020-12-04 PROCEDURE — 87086 URINE CULTURE/COLONY COUNT: CPT | Performed by: PHYSICIAN ASSISTANT

## 2020-12-04 RX ORDER — PHENAZOPYRIDINE HYDROCHLORIDE 200 MG/1
200 TABLET, FILM COATED ORAL 3 TIMES DAILY PRN
Qty: 6 TABLET | Refills: 0 | Status: SHIPPED | OUTPATIENT
Start: 2020-12-04 | End: 2021-02-24

## 2020-12-04 RX ORDER — CIPROFLOXACIN 250 MG/1
250 TABLET, FILM COATED ORAL 2 TIMES DAILY
Qty: 14 TABLET | Refills: 0 | Status: SHIPPED | OUTPATIENT
Start: 2020-12-04 | End: 2020-12-11

## 2020-12-04 NOTE — PROGRESS NOTES
Subjective     Liana Giraldo is a 80 year old female who presents to clinic today for the following health issues:    HPI         Genitourinary - Female  Onset/Duration: 3 days  Description:   Painful urination (Dysuria): YES- today            Frequency: YES  Blood in urine (Hematuria): YES  Delay in urine (Hesitency): no  Intensity: moderate, severe  Progression of Symptoms:  worsening  Accompanying Signs & Symptoms:  Fever/chills: no  Flank pain: no  Nausea and vomiting: no  Vaginal symptoms: none  Abdominal/Pelvic Pain: no  History:   History of frequent UTI s: no  History of kidney stones: no  Sexually Active: no  Possibility of pregnancy: No  Precipitating or alleviating factors: None  Therapies tried and outcome:  nothing         Review of Systems   Constitutional, HEENT, cardiovascular, pulmonary, gi and gu systems are negative, except as otherwise noted.      Objective    /76   Pulse 88   Temp 98.9  F (37.2  C) (Oral)   Wt 74.4 kg (164 lb)   SpO2 100%   BMI 27.72 kg/m    Body mass index is 27.72 kg/m .  Physical Exam   GENERAL: healthy, alert and no distress  NECK: no adenopathy  RESP: lungs clear to auscultation - no rales, rhonchi or wheezes  CV: regular rates and rhythm and normal S1 S2, no S3 or S4  MS: no gross musculoskeletal defects noted, no edema  SKIN: no suspicious lesions or rashes    Results for orders placed or performed in visit on 12/04/20 (from the past 24 hour(s))   Urine Microscopic   Result Value Ref Range    WBC Urine 5-10 (A) OTO5^0 - 5 /HPF    RBC Urine >100 (A) OTO2^O - 2 /HPF    Comment Urine Interfering substances, dipstick not done            Assessment & Plan     Acute cystitis with hematuria    - Urine Culture Aerobic Bacterial  - ciprofloxacin (CIPRO) 250 MG tablet; Take 1 tablet (250 mg) by mouth 2 times daily for 7 days  - UA with Microscopic reflex to Culture; Future  - phenazopyridine (PYRIDIUM) 200 MG tablet; Take 1 tablet (200 mg) by mouth 3 times daily as  needed for irritation    Gross hematuria    - UA with Microscopic reflex to Culture; Future  - phenazopyridine (PYRIDIUM) 200 MG tablet; Take 1 tablet (200 mg) by mouth 3 times daily as needed for irritation    Urinary frequency    - Urine Microscopic        Patient Instructions   Push fluid    Antibiotics    Recheck urine testing. - lab appointment       Return in about 2 weeks (around 12/18/2020) for Lab Work.    Yanique Sales PA-C  [facility]

## 2020-12-05 LAB
BACTERIA SPEC CULT: ABNORMAL
SPECIMEN SOURCE: ABNORMAL

## 2020-12-15 DIAGNOSIS — N30.01 ACUTE CYSTITIS WITH HEMATURIA: ICD-10-CM

## 2020-12-15 DIAGNOSIS — R31.0 GROSS HEMATURIA: ICD-10-CM

## 2020-12-15 LAB
ALBUMIN UR-MCNC: NEGATIVE MG/DL
APPEARANCE UR: CLEAR
BACTERIA #/AREA URNS HPF: ABNORMAL /HPF
BILIRUB UR QL STRIP: NEGATIVE
COLOR UR AUTO: YELLOW
GLUCOSE UR STRIP-MCNC: NEGATIVE MG/DL
HGB UR QL STRIP: NEGATIVE
KETONES UR STRIP-MCNC: NEGATIVE MG/DL
LEUKOCYTE ESTERASE UR QL STRIP: NEGATIVE
NITRATE UR QL: NEGATIVE
PH UR STRIP: 5.5 PH (ref 5–7)
RBC #/AREA URNS AUTO: ABNORMAL /HPF
SOURCE: ABNORMAL
SP GR UR STRIP: 1.02 (ref 1–1.03)
UROBILINOGEN UR STRIP-ACNC: 0.2 EU/DL (ref 0.2–1)
WBC #/AREA URNS AUTO: ABNORMAL /HPF

## 2020-12-15 PROCEDURE — 81001 URINALYSIS AUTO W/SCOPE: CPT | Performed by: PHYSICIAN ASSISTANT

## 2020-12-27 ENCOUNTER — MYC MEDICAL ADVICE (OUTPATIENT)
Dept: INTERNAL MEDICINE | Facility: CLINIC | Age: 80
End: 2020-12-27

## 2020-12-28 ENCOUNTER — E-VISIT (OUTPATIENT)
Dept: INTERNAL MEDICINE | Facility: CLINIC | Age: 80
End: 2020-12-28

## 2020-12-28 DIAGNOSIS — R30.0 DYSURIA: ICD-10-CM

## 2020-12-28 DIAGNOSIS — R82.90 NONSPECIFIC FINDING ON EXAMINATION OF URINE: Primary | ICD-10-CM

## 2020-12-28 DIAGNOSIS — R30.0 DYSURIA: Primary | ICD-10-CM

## 2020-12-28 DIAGNOSIS — N30.00 ACUTE CYSTITIS WITHOUT HEMATURIA: ICD-10-CM

## 2020-12-28 LAB
ALBUMIN UR-MCNC: NEGATIVE MG/DL
APPEARANCE UR: CLEAR
BILIRUB UR QL STRIP: NEGATIVE
COLOR UR AUTO: YELLOW
GLUCOSE UR STRIP-MCNC: NEGATIVE MG/DL
HGB UR QL STRIP: ABNORMAL
KETONES UR STRIP-MCNC: NEGATIVE MG/DL
LEUKOCYTE ESTERASE UR QL STRIP: ABNORMAL
NITRATE UR QL: NEGATIVE
NON-SQ EPI CELLS #/AREA URNS LPF: ABNORMAL /LPF
PH UR STRIP: 5.5 PH (ref 5–7)
RBC #/AREA URNS AUTO: ABNORMAL /HPF
SOURCE: ABNORMAL
SP GR UR STRIP: 1.02 (ref 1–1.03)
UROBILINOGEN UR STRIP-ACNC: 0.2 EU/DL (ref 0.2–1)
WBC #/AREA URNS AUTO: ABNORMAL /HPF

## 2020-12-28 PROCEDURE — 87086 URINE CULTURE/COLONY COUNT: CPT | Performed by: PHYSICIAN ASSISTANT

## 2020-12-28 PROCEDURE — 99421 OL DIG E/M SVC 5-10 MIN: CPT | Performed by: PHYSICIAN ASSISTANT

## 2020-12-28 PROCEDURE — 81001 URINALYSIS AUTO W/SCOPE: CPT | Performed by: PHYSICIAN ASSISTANT

## 2020-12-28 RX ORDER — CIPROFLOXACIN 250 MG/1
250 TABLET, FILM COATED ORAL 2 TIMES DAILY
Qty: 14 TABLET | Refills: 0 | Status: SHIPPED | OUTPATIENT
Start: 2020-12-28 | End: 2021-01-04

## 2020-12-28 NOTE — TELEPHONE ENCOUNTER
Provider please advise of mychart message.     Ok with placing another UA? Or having patient follow up?    Ora SOUZAN, RN, PHN

## 2020-12-28 NOTE — TELEPHONE ENCOUNTER
Patient notified and will follow instructions to do Evisit . Please watch for this .Liset Maldonado RN

## 2020-12-28 NOTE — TELEPHONE ENCOUNTER
She can do an evisit on My chart and select me as the provider. Then can order labs and treatment in that encounter as it has been more than 2 weeks since office visit.   May need to call or message her about that option.

## 2020-12-28 NOTE — PATIENT INSTRUCTIONS
Push fluid  Antibiotic- Cipro 250 mg twice daily for one week.   Urine will be cultured will contact you on my chart if need to change treatment.   Dear Liana Giraldo    After reviewing your responses, I've been able to diagnose you with a urinary tract infection, which is a common infection of the bladder with bacteria.  This is not a sexually transmitted infection, though urinating immediately after intercourse can help prevent infections.  Drinking lots of fluids is also helpful to clear your current infection and prevent the next one.      I have sent a prescription for antibiotics to your pharmacy to treat this infection.    It is important that you take all of your prescribed medication even if your symptoms are improving after a few doses.  Taking all of your medicine helps prevent the symptoms from returning.     If your symptoms worsen, you develop pain in your back or stomach, develop fevers, or are not improving in 5 days, please contact your primary care provider for an appointment or visit any of our convenient Walk-in or Urgent Care Centers to be seen, which can be found on our website here.    Thanks again for choosing us as your health care partner,    Yanique Sales PA-C

## 2020-12-29 LAB
BACTERIA SPEC CULT: NO GROWTH
Lab: NORMAL
SPECIMEN SOURCE: NORMAL

## 2021-01-20 ENCOUNTER — MYC MEDICAL ADVICE (OUTPATIENT)
Dept: INTERNAL MEDICINE | Facility: CLINIC | Age: 81
End: 2021-01-20

## 2021-02-17 ENCOUNTER — MYC MEDICAL ADVICE (OUTPATIENT)
Dept: INTERNAL MEDICINE | Facility: CLINIC | Age: 81
End: 2021-02-17

## 2021-02-17 NOTE — TELEPHONE ENCOUNTER
Recommend an appointment in person with Dr Weber so an abdominal exam can be done to evaluate if there is a hernia or not

## 2021-02-23 ENCOUNTER — IMMUNIZATION (OUTPATIENT)
Dept: NURSING | Facility: CLINIC | Age: 81
End: 2021-02-23
Payer: COMMERCIAL

## 2021-02-23 PROCEDURE — 0001A PR COVID VAC PFIZER DIL RECON 30 MCG/0.3 ML IM: CPT

## 2021-02-23 PROCEDURE — 91300 PR COVID VAC PFIZER DIL RECON 30 MCG/0.3 ML IM: CPT

## 2021-02-24 ENCOUNTER — OFFICE VISIT (OUTPATIENT)
Dept: INTERNAL MEDICINE | Facility: CLINIC | Age: 81
End: 2021-02-24
Payer: COMMERCIAL

## 2021-02-24 VITALS
HEIGHT: 65 IN | DIASTOLIC BLOOD PRESSURE: 80 MMHG | BODY MASS INDEX: 27.32 KG/M2 | RESPIRATION RATE: 16 BRPM | HEART RATE: 68 BPM | SYSTOLIC BLOOD PRESSURE: 124 MMHG | WEIGHT: 164 LBS | OXYGEN SATURATION: 97 % | TEMPERATURE: 98.7 F

## 2021-02-24 DIAGNOSIS — R10.84 ABDOMINAL PAIN, GENERALIZED: Primary | ICD-10-CM

## 2021-02-24 PROCEDURE — 99213 OFFICE O/P EST LOW 20 MIN: CPT | Performed by: INTERNAL MEDICINE

## 2021-02-24 ASSESSMENT — MIFFLIN-ST. JEOR: SCORE: 1206.84

## 2021-02-24 NOTE — PROGRESS NOTES
Assessment & Plan     Abdominal pain, generalized  No focal changes noted on exam with no acute findings.  No obvious hernia.  Supportive care discussed.  Discussed bowel regimen with patient.  Observe clinically.       See Patient Instructions    Return in about 3 months (around 5/24/2021) for if symptoms recur or worsen.    Lawrence Singh MD  [facility] TAY Gaines is a 81 year old who presents for the following health issues     HPI     New patient to me having seen Dr. Weber in the past.    See phone message from patient:  I was in to see you on Dec. 4 in regards to a bladder infection, which has since gone away with antibiotics.  At the time I thought I might also have a Hernia since I see a bulge and intermittent pain on my left side.  I wonder if this is something you can diagnosis.  If I need an ex ray, where would that need to be done.  I am an 81 year old  and need to know my options.  I prefer to have any work done be at the [facility] in [place]. which I can get to easily.  Also, if surgery is needed or laparoscopy, can that be done at the [facility] also.  What would it all entail?  With the Pandemic and frigged weather, I have put this off but know I need to get started with a diagnosis to relieve my stress.  I have not yet gotten a vaccination as it's hard to find a place in the area that isn't always booked.  Thank you for any help you can get back to me with.      Patient states she is intermittently prone to constipation.  Reports that no real significant abdominal discomfort that has been progressive.  There is been no nausea, vomiting or significant changes in her stool pattern.  She notes that at times she strains due to her constipation and has been working on this with a regimen of over-the-counter supplements and fiber supplements that were again discussed with her.    Review of Systems   CONSTITUTIONAL: NEGATIVE for fever, chills,  "change in weight  EYES: NEGATIVE for vision changes or irritation  ENT/MOUTH: NEGATIVE for ear, mouth and throat problems  RESP: NEGATIVE for significant cough or SOB  CV: NEGATIVE for chest pain, palpitations or peripheral edema  : NEGATIVE for frequency, dysuria, or hematuria  MUSCULOSKELETAL: NEGATIVE for significant arthralgias or myalgia  NEURO: NEGATIVE for weakness, dizziness or paresthesias  HEME: NEGATIVE for bleeding problems  PSYCHIATRIC: NEGATIVE for changes in mood or affect      Objective    /80   Pulse 68   Temp 98.7  F (37.1  C) (Temporal)   Resp 16   Ht 1.638 m (5' 4.5\")   Wt 74.4 kg (164 lb)   SpO2 97%   BMI 27.72 kg/m    Body mass index is 27.72 kg/m .  Physical Exam   GENERAL: healthy, alert and no distress  RESP: lungs clear to auscultation - no rales, rhonchi or wheezes  CV: regular rate and rhythm, normal S1 S2, no S3 or S4, no click or rub, no peripheral edema and peripheral pulses strong  ABDOMEN: The patient was evaluated in both the supine and standing upright position.  There is a large 6-1/2 inch plus right lower quadrant healed appendectomy scar.  Bowel sounds are active throughout the abdomen.  There is no reproducible tenderness noted.  The liver and spleen are not enlarged.  With straining there does not appear to be any obvious hernia.  There is no distinct bulging noted throughout the abdomen with the exception of mild adipose tissue noted.  In the upright position there again is no nodular changes or obvious hernia noted.  Does not appear to be any umbilical or inguinal changes.  MS: no gross musculoskeletal defects noted, no edema  NEURO: Normal strength and tone, mentation intact and speech normal  PSYCH: mentation appears normal, affect normal/bright              "

## 2021-03-16 ENCOUNTER — IMMUNIZATION (OUTPATIENT)
Dept: NURSING | Facility: CLINIC | Age: 81
End: 2021-03-16
Attending: INTERNAL MEDICINE
Payer: COMMERCIAL

## 2021-03-16 PROCEDURE — 0002A PR COVID VAC PFIZER DIL RECON 30 MCG/0.3 ML IM: CPT

## 2021-03-16 PROCEDURE — 91300 PR COVID VAC PFIZER DIL RECON 30 MCG/0.3 ML IM: CPT

## 2021-04-07 ENCOUNTER — OFFICE VISIT (OUTPATIENT)
Dept: URGENT CARE | Facility: URGENT CARE | Age: 81
End: 2021-04-07
Payer: COMMERCIAL

## 2021-04-07 VITALS
OXYGEN SATURATION: 98 % | WEIGHT: 164 LBS | DIASTOLIC BLOOD PRESSURE: 78 MMHG | RESPIRATION RATE: 20 BRPM | TEMPERATURE: 97.8 F | SYSTOLIC BLOOD PRESSURE: 120 MMHG | BODY MASS INDEX: 27.72 KG/M2 | HEART RATE: 80 BPM

## 2021-04-07 DIAGNOSIS — N39.0 ACUTE UTI: Primary | ICD-10-CM

## 2021-04-07 LAB
ALBUMIN UR-MCNC: 30 MG/DL
APPEARANCE UR: ABNORMAL
BACTERIA #/AREA URNS HPF: ABNORMAL /HPF
BILIRUB UR QL STRIP: NEGATIVE
COLOR UR AUTO: YELLOW
GLUCOSE UR STRIP-MCNC: NEGATIVE MG/DL
HGB UR QL STRIP: ABNORMAL
KETONES UR STRIP-MCNC: NEGATIVE MG/DL
LEUKOCYTE ESTERASE UR QL STRIP: ABNORMAL
NITRATE UR QL: POSITIVE
NON-SQ EPI CELLS #/AREA URNS LPF: ABNORMAL /LPF
PH UR STRIP: 5.5 PH (ref 5–7)
RBC #/AREA URNS AUTO: ABNORMAL /HPF
SOURCE: ABNORMAL
SP GR UR STRIP: 1.02 (ref 1–1.03)
UROBILINOGEN UR STRIP-ACNC: 1 EU/DL (ref 0.2–1)
WBC #/AREA URNS AUTO: >100 /HPF

## 2021-04-07 PROCEDURE — 87086 URINE CULTURE/COLONY COUNT: CPT | Performed by: PHYSICIAN ASSISTANT

## 2021-04-07 PROCEDURE — 81001 URINALYSIS AUTO W/SCOPE: CPT | Performed by: FAMILY MEDICINE

## 2021-04-07 PROCEDURE — 87088 URINE BACTERIA CULTURE: CPT | Performed by: PHYSICIAN ASSISTANT

## 2021-04-07 PROCEDURE — 99214 OFFICE O/P EST MOD 30 MIN: CPT | Performed by: PHYSICIAN ASSISTANT

## 2021-04-07 PROCEDURE — 87186 SC STD MICRODIL/AGAR DIL: CPT | Performed by: PHYSICIAN ASSISTANT

## 2021-04-07 RX ORDER — SULFAMETHOXAZOLE/TRIMETHOPRIM 800-160 MG
1 TABLET ORAL 2 TIMES DAILY
Qty: 14 TABLET | Refills: 0 | Status: SHIPPED | OUTPATIENT
Start: 2021-04-07 | End: 2021-04-14

## 2021-04-07 NOTE — PROGRESS NOTES
Assessment & Plan     1. Acute UTI  81 year old female presents to the clinic for evaluation of UTI. Was treated for a UTI 4 months ago.  UA and symptoms are consistent with UTI. No evidence for urosepsis, pyelonephritis, or nephrolithiasis.   Patient is concerned as this is her second UTI, discussed pushing fluids and leaning after urination to ensure that bladder is emptied. She has had one clear urine in the past 4 months without hematuria.   If recurrent UTI, or development of hematuria referral to Urology indication.    - UA reflex to Microscopic and Culture  - Urine Culture Aerobic Bacterial  - Urine Microscopic  - sulfamethoxazole-trimethoprim (BACTRIM DS) 800-160 MG tablet; Take 1 tablet by mouth 2 times daily for 7 days  Dispense: 14 tablet; Refill: 0  0956}    Return in about 3 days (around 4/10/2021), or if symptoms worsen or fail to improve.    Diagnosis and treatment plan was reviewed with patient and/or family.   We went over any labs or imaging. Discussed worsening symptoms or little to no relief despite treatment plan to follow-up with PCP or return to clinic.  Patient verbalizes understanding. All questions were addressed and answered.     Nay Sparks PA-C  [facility]    CHIEF COMPLAINT:   Chief Complaint   Patient presents with     Urgent Care     UTI     poss UTI      Subjective      Liana Giraldo is a 81 year old female who  presents today for a possible UTI. Symptoms of dysuria and frequency started intermittently 1-2 weeks ago, but have worsened in the past several days. She complains of having dysuria, frequency and urgency. Denies hematuria.   gradual onsetand moderate.  There is no history of fever, chills, nausea or vomiting.  No history of vaginal discharge. This patient does have a history of urinary tract infections. Last UTI was 12/2020, she was treated with Cipro and symptoms resolved. Denies vaginal symptoms.       Past Medical History:   Diagnosis  Date     Anxiety      Diffuse cystic mastopathy      Disturbances of sulphur-bearing amino-acid metabolism      ECZEMATOUS DERMATITIS      MILD MITRAL VALVE INSUFFUCIENCY 1/02     Osteopenia      Other and unspecified hyperlipidemia      Personal history of colonic polyps 8/05    Hyperplastic     Rotator cuff (capsule) sprain 12/01     right with 3mm supraspinatous tendon tear     Synovial cyst of popliteal space 7/30/03    right     TIA (transient ischemic attack) 2/25/16     Vitamin D deficiency disease 2/23/2013     Past Surgical History:   Procedure Laterality Date     ZZC NONSPECIFIC PROCEDURE      appy     Social History     Tobacco Use     Smoking status: Never Smoker     Smokeless tobacco: Never Used   Substance Use Topics     Alcohol use: Yes     Comment: occ     Current Outpatient Medications   Medication     aspirin (ASA) 81 MG tablet     pravastatin (PRAVACHOL) 80 MG tablet     sulfamethoxazole-trimethoprim (BACTRIM DS) 800-160 MG tablet     vitamin D3 (CHOLECALCIFEROL) 2000 units (50 mcg) tablet     No current facility-administered medications for this visit.      Allergies   Allergen Reactions     Lipitor [Atorvastatin Calcium]      Myalgias     Oxycodone Nausea and Vomiting     Rosuvastatin Calcium      muscle achiness     Simvastatin      muscle achiness       10 point ROS of systems were all negative except for pertinent positives noted in my HPI.      Exam:  /78   Pulse 80   Temp 97.8  F (36.6  C) (Tympanic)   Resp 20   Wt 74.4 kg (164 lb)   SpO2 98%   BMI 27.72 kg/m    Constitutional: healthy, alert and no distress  Head: Normocephalic, atraumatic.  Eyes: conjunctiva clear, no drainage  ENT: MMM  Cardiovascular: RRR  Respiratory: CTA bilaterally, no rhonchi or rales  Gastrointestinal: soft and nontender. NO CVA tenderness. No rebound, guarding or rigidity.   Skin: no rashes  Neurologic: Speech clear, gait normal. Moves all extremities.    Results for orders placed or performed in visit  on 04/07/21   UA reflex to Microscopic and Culture     Status: Abnormal    Specimen: Midstream Urine   Result Value Ref Range    Color Urine Yellow     Appearance Urine Cloudy     Glucose Urine Negative NEG^Negative mg/dL    Bilirubin Urine Negative NEG^Negative    Ketones Urine Negative NEG^Negative mg/dL    Specific Gravity Urine 1.020 1.003 - 1.035    Blood Urine Moderate (A) NEG^Negative    pH Urine 5.5 5.0 - 7.0 pH    Protein Albumin Urine 30 (A) NEG^Negative mg/dL    Urobilinogen Urine 1.0 0.2 - 1.0 EU/dL    Nitrite Urine Positive (A) NEG^Negative    Leukocyte Esterase Urine Large (A) NEG^Negative    Source Midstream Urine    Urine Microscopic     Status: Abnormal   Result Value Ref Range    WBC Urine >100 (A) OTO5^0 - 5 /HPF    RBC Urine 10-25 (A) OTO2^O - 2 /HPF    Squamous Epithelial /LPF Urine Moderate (A) FEW^Few /LPF    Bacteria Urine Many (A) NEG^Negative /HPF

## 2021-04-07 NOTE — PATIENT INSTRUCTIONS

## 2021-04-08 LAB
BACTERIA SPEC CULT: ABNORMAL
SPECIMEN SOURCE: ABNORMAL

## 2021-04-10 ENCOUNTER — NURSE TRIAGE (OUTPATIENT)
Dept: NURSING | Facility: CLINIC | Age: 81
End: 2021-04-10

## 2021-04-10 DIAGNOSIS — N39.0 URINARY TRACT INFECTION: Primary | ICD-10-CM

## 2021-04-10 RX ORDER — CIPROFLOXACIN 500 MG/1
500 TABLET, FILM COATED ORAL 2 TIMES DAILY
Qty: 10 TABLET | Refills: 0 | Status: SHIPPED | OUTPATIENT
Start: 2021-04-10 | End: 2021-08-10

## 2021-04-10 NOTE — TELEPHONE ENCOUNTER
Pt seen in clinic on 4/7/21 for UTI, hx UTI 12/2020 and 1/2021.  Pt states she is not feeling well, feels tired and worn out, loss of appetite.  Pt wants something different than the Bactrim.  Pt has loss of appetite and feels yucky.  Pain with urination is gone.  On call paged @ 2:55 pm.  Dr. Cari Lucio returned page @ 2:55 pm.  Rx Ciprofloxacin sent to Pharmacy.  This information called to pt.  Alba Urrutia RN, MA  [place] Nurse Advisor              Additional Information    Negative: Nursing judgment or information in reference    Protocols used: NO GUIDELINE [id number]

## 2021-07-06 ENCOUNTER — TRANSFERRED RECORDS (OUTPATIENT)
Dept: HEALTH INFORMATION MANAGEMENT | Facility: CLINIC | Age: 81
End: 2021-07-06

## 2021-07-21 ENCOUNTER — TRANSFERRED RECORDS (OUTPATIENT)
Dept: HEALTH INFORMATION MANAGEMENT | Facility: CLINIC | Age: 81
End: 2021-07-21

## 2021-08-09 ENCOUNTER — MYC MEDICAL ADVICE (OUTPATIENT)
Dept: INTERNAL MEDICINE | Facility: CLINIC | Age: 81
End: 2021-08-09

## 2021-08-09 NOTE — TELEPHONE ENCOUNTER
Please see mychart from patient and advise appropriate course of action.        Rubin Bojorquez RN  [facility] Triage Nurse

## 2021-08-10 RX ORDER — LORAZEPAM 0.5 MG/1
TABLET ORAL
Qty: 30 TABLET | Refills: 0 | OUTPATIENT
Start: 2021-08-10

## 2021-08-10 NOTE — TELEPHONE ENCOUNTER
Lorazepam was prescribed last 4 years ago.  Unclear what anxiety or other mental health issues are going on now requiring refill of this controlled substance medication.  Would recommend patient do an E-visit discussing current issues going on to require refill request and then will evaluate appropriateness of medication refill.  Inform patient

## 2021-08-10 NOTE — TELEPHONE ENCOUNTER
Routing refill request to provider for review/approval because:  Drug not on the G refill protocol     Pending Prescriptions:                       Disp   Refills    LORazepam (ATIVAN) 0.5 MG tablet [Pharmac*30 tab*0            Sig: TAKE 1 TABLET BY MOUTH DAILY AS NEEDED FOR           ANXIETY.    Last Written Prescription Date:  07/19/17  Last Fill Quantity: 30,  # refills: 0   Last office visit: 2/24/2021 with prescribing provider:    Future Office Visit:  none    Naima Martin RN  [facility]

## 2021-08-11 NOTE — TELEPHONE ENCOUNTER
Patient has a virtual appointment scheduled for 2021 and was wondering if she could get enough Lorazepam to hold until the appointment. Because patient has been experiencing  some anxiety because of a  she is going to on Friday.     Valery Arnett MA

## 2021-08-17 ENCOUNTER — VIRTUAL VISIT (OUTPATIENT)
Dept: INTERNAL MEDICINE | Facility: CLINIC | Age: 81
End: 2021-08-17
Payer: COMMERCIAL

## 2021-08-17 DIAGNOSIS — F41.9 ANXIETY: Primary | ICD-10-CM

## 2021-08-17 DIAGNOSIS — E78.5 HYPERLIPIDEMIA LDL GOAL <100: ICD-10-CM

## 2021-08-17 PROCEDURE — 99214 OFFICE O/P EST MOD 30 MIN: CPT | Mod: 95 | Performed by: INTERNAL MEDICINE

## 2021-08-17 RX ORDER — LORAZEPAM 0.5 MG/1
0.5 TABLET ORAL DAILY PRN
Qty: 30 TABLET | Refills: 0 | Status: SHIPPED | OUTPATIENT
Start: 2021-08-17 | End: 2023-01-05

## 2021-08-17 ASSESSMENT — ANXIETY QUESTIONNAIRES
5. BEING SO RESTLESS THAT IT IS HARD TO SIT STILL: NOT AT ALL
3. WORRYING TOO MUCH ABOUT DIFFERENT THINGS: SEVERAL DAYS
7. FEELING AFRAID AS IF SOMETHING AWFUL MIGHT HAPPEN: NOT AT ALL
6. BECOMING EASILY ANNOYED OR IRRITABLE: NOT AT ALL
2. NOT BEING ABLE TO STOP OR CONTROL WORRYING: SEVERAL DAYS
1. FEELING NERVOUS, ANXIOUS, OR ON EDGE: SEVERAL DAYS
IF YOU CHECKED OFF ANY PROBLEMS ON THIS QUESTIONNAIRE, HOW DIFFICULT HAVE THESE PROBLEMS MADE IT FOR YOU TO DO YOUR WORK, TAKE CARE OF THINGS AT HOME, OR GET ALONG WITH OTHER PEOPLE: NOT DIFFICULT AT ALL
GAD7 TOTAL SCORE: 4

## 2021-08-17 ASSESSMENT — PATIENT HEALTH QUESTIONNAIRE - PHQ9
5. POOR APPETITE OR OVEREATING: SEVERAL DAYS
SUM OF ALL RESPONSES TO PHQ QUESTIONS 1-9: 0

## 2021-08-17 NOTE — PROGRESS NOTES
Liana is a 81 year old who is being evaluated via a billable video visit.      How would you like to obtain your AVS? [employer]  If the video visit is dropped, the invitation should be resent by: Other e-mail: [employer]   Will anyone else be joining your video visit? No      ASSESSMENT:   1. Anxiety  Controlled.  Continue rare use of lorazepam.  Declines need for counseling  - LORazepam (ATIVAN) 0.5 MG tablet; Take 1 tablet (0.5 mg) by mouth daily as needed for anxiety  Dispense: 30 tablet; Refill: 0    2. Hyperlipidemia LDL goal <100  On pravastatin.  Due for lab follow-up in October  - Comprehensive metabolic panel; Future  - Lipid panel reflex to direct LDL Fasting; Future      PLAN:  Continue current medications.  Lorazepam refilled to use on rare basis.  If fine but you have to use this more often than twice a month, then contact clinic and will consider counseling versus different medication use to prevent anxiety.  Discussed relaxation techniques  Continue pravastatin  Call  [phone] or use [employer] to schedule a future lab appointment  fasting in October   For fasting labs, please refrain from eating for 8 hours or more.   Drink 2 glasses of water before your lab appointment. It is fine to take your  oral medications on the morning of the lab test as usual        Video-Visit Details    Type of service:  Video Visit    Video Start Time: 3:40 pm    Video End Time:3:55pm    Originating Location (pt. Location): Home    Distant Location (provider location):  [facility]     Platform used for Video Visit: Danyell Weber MD  Internal Medicine Department  [facility]  Internal Medicine Department      (Chart documentation was completed, in part, with [employer] voice-recognition software. Even though reviewed, some grammatical, spelling, and word errors may remain.)       Subjective   Liana is a 81 year old who presents for the following health issues      HPI     Chief Complaint   Patient presents with     Refill Request     Lorazepam restart      PHQ 8/17/2021   PHQ-9 Total Score 0   Q9: Thoughts of better off dead/self-harm past 2 weeks Not at all     BELKIS-7 SCORE 8/17/2021   Total Score 4      History of chronic anxiety.  Rare use of lorazepam.  Use a total of 30 pills in the last 4 years.  Occasional anxiousness stress with Covid pandemic.  Patient is .  Sleeping okay.  Denies depression. PHQ = 0.  Request lorazepam prescription to have on hand for rare flares of anxiety.. See BELKIS below.  On statin for hyperlipidemia.  Last LDL minimally above goal.  On pravastatin.  Has been watching her diet more over the last year.  Due for lab follow-up     BELKIS-7   [employer], 2002; Used with Permission) 8/17/2021   1. Feeling nervous, anxious, or on edge 1   2. Not being able to stop or control worrying 1   3. Worrying too much about different things 1   4. Trouble relaxing 1   5. Being so restless that it is hard to sit still 0   6. Becoming easily annoyed or irritable 0   7. Feeling afraid, as if something awful might happen 0   BELKIS-7 Total Score 4   If you checked any problems, how difficult have they made it for you to do your work, take care of things at home, or get along with other people? Not difficult at all        Component      Latest Ref Rng & Units 10/20/2020   Sodium      133 - 144 mmol/L 140   Potassium      3.4 - 5.3 mmol/L 4.6   Chloride      94 - 109 mmol/L 108   Carbon Dioxide      20 - 32 mmol/L 30   Anion Gap      3 - 14 mmol/L 2 (L)   Glucose      70 - 99 mg/dL 94   Urea Nitrogen      7 - 30 mg/dL 16   Creatinine      0.52 - 1.04 mg/dL 0.80   GFR Estimate      >60 mL/min/1.73:m2 70   GFR Estimate If Black      >60 mL/min/1.73:m2 81   Calcium      8.5 - 10.1 mg/dL 9.2   Bilirubin Total      0.2 - 1.3 mg/dL 0.7   Albumin      3.4 - 5.0 g/dL 3.8   Protein Total      6.8 - 8.8 g/dL 7.9   Alkaline Phosphatase      40 - 150 U/L 67   ALT      0 - 50  U/L 17   AST      0 - 45 U/L 15   Cholesterol      <200 mg/dL 191   Triglycerides      <150 mg/dL 97   HDL Cholesterol      >49 mg/dL 65   LDL Cholesterol Calculated      <100 mg/dL 107 (H)   Non HDL Cholesterol      <130 mg/dL 126   TSH      0.40 - 4.00 mU/L 2.08     Additional ROS:   Constitutional, HEENT, Cardiovascular, Pulmonary, GI and , Neuro, MSK and Psych review of systems/symptoms are otherwise negative or unchanged from previous, except as noted above.           Objective :  No vitals obtained today    Physical Exam:  GENERAL: Healthy, alert and no distress  EYES: Eyes grossly normal to inspection, conjunctivae and sclerae normal  RESP: no audible wheeze, cough, or visible cyanosis.  No visible retractions or increased work of breathing.  Able to speak fully in complete sentences.  NEURO: Cranial nerves grossly intact, mentation intact and speech normal  PSYCH: mentation appears normal, affect normal currently despite subjective symptoms above, judgement and insight intact, normal speech and appearance well-groomed

## 2021-08-18 ASSESSMENT — ANXIETY QUESTIONNAIRES: GAD7 TOTAL SCORE: 4

## 2021-09-18 ENCOUNTER — OFFICE VISIT (OUTPATIENT)
Dept: URGENT CARE | Facility: URGENT CARE | Age: 81
End: 2021-09-18
Payer: COMMERCIAL

## 2021-09-18 VITALS
SYSTOLIC BLOOD PRESSURE: 139 MMHG | WEIGHT: 161.4 LBS | OXYGEN SATURATION: 99 % | BODY MASS INDEX: 27.28 KG/M2 | DIASTOLIC BLOOD PRESSURE: 71 MMHG | TEMPERATURE: 97 F | HEART RATE: 59 BPM

## 2021-09-18 DIAGNOSIS — G44.209 TENSION HEADACHE: ICD-10-CM

## 2021-09-18 DIAGNOSIS — M79.12 STERNOCLEIDOMASTOID MUSCLE TENDERNESS: Primary | ICD-10-CM

## 2021-09-18 PROCEDURE — 99213 OFFICE O/P EST LOW 20 MIN: CPT | Performed by: FAMILY MEDICINE

## 2021-09-18 RX ORDER — TIZANIDINE 2 MG/1
2 TABLET ORAL EVERY 8 HOURS PRN
Qty: 30 TABLET | Refills: 0 | Status: SHIPPED | OUTPATIENT
Start: 2021-09-18 | End: 2021-11-15

## 2021-09-18 NOTE — PATIENT INSTRUCTIONS
Okay to take ibuprofen 200 mg - 3 tablets (600 mg) every 8 hours as needed.  Okay to take tylenol 500 mg - 2 tablets (1000 mg) every 6-8 hours as needed, do not exceed 3000 mg in 24 hours.  Okay to take zanaflex 2 mg every 8 hours to help with muscle tightness/spasm that is causing headache/neck pain  Warm compress to area of discomfort    Patient Education     Your Neck Muscles  The muscles in the neck and shoulders need to be strong to hold the neck and head in place. These muscles also help move the neck and shoulders. Your healthcare provider can recommend exercises to help stretch and strengthen your neck muscles.    [employer] last reviewed this educational content on 5/1/2018 2000-2021 [employer]. All rights reserved. This information is not intended as a substitute for professional medical care. Always follow your healthcare professional's instructions.           Patient Education     Tension Headache     A muscle tension headache is a very common cause of head pain. It s also called a stress headache. When some people are under stress, they tense the muscles of their shoulder, neck, and scalp without knowing it. If this tension lasts long enough, a headache can occur. A tension headache can be quite painful. It can last for hours or even days.  Home care  Follow these tips when caring for yourself at home:    Don t drive yourself home if you were given pain medicine for your headache. Instead, have someone else drive you home. Try to sleep when you get home. You should feel much better when you wake up.    Put heat on the back of your neck to help ease neck spasm.  How to prevent tension-type headaches    Figure out what is causing stress in your life. Learn new ways to handle your stress. Ideas include regular exercise, biofeedback, self-hypnosis, yoga, and meditation. Talk with your healthcare provider to find out more information about managing stress. Many books and digital media are  also available on this subject.    Take time out at the first sign of a tension headache, if possible. Take yourself out of the stressful situation. Find a quiet, comfortable place to sit or lie down and let yourself relax. Heat and deep massage of the tight areas in the neck and shoulders may help ease muscle spasm. You may also get relief from a medicine such as acetaminophen, ibuprofen, naproxen, or a prescribed muscle relaxant.    Follow-up care  Follow up with your healthcare provider, or as advised. Talk with your provider if you have frequent headaches. He or she can figure out a treatment plan. Ask if you can have medicine to take at home the next time you get a bad headache. This may keep you from having to visit the emergency department in the future. You may need to see a headache specialist (neurologist) if you continue to have headaches.  When to seek medical advice  Call your healthcare provider right away if any of these occur:    Your head pain gets worse during sexual intercourse or strenuous activity    Your head pain doesn t get better within 24 hours    You aren t able to keep liquids down (repeated vomiting)    Fever of 100.4 F (38 C) or higher, or as directed by your healthcare provider    Stiff neck    Extreme drowsiness, confusion, or fainting    Dizziness or dizziness with spinning sensation (vertigo)    Weakness in an arm or leg or one side of your face    You have trouble speaking    Your vision changes  [employer] last reviewed this educational content on 10/1/2019    2930-6663 [employer]. All rights reserved. This information is not intended as a substitute for professional medical care. Always follow your healthcare professional's instructions.

## 2021-09-18 NOTE — PROGRESS NOTES
SUBJECTIVE:  Chief Complaint   Patient presents with     HEADACHE X 2 DAYS     PER PT HAS BEEN HAVING LEFT NECK MUSCLE PAIN WHICH HAS BEEN RADIATING TO HER SKULL FOR 2 DAYS     Liana Giraldo is a 81 year old female who presents with a chief complaint of headache, neck muscle pain.    Developed left sided neck pain, will shoot upwards and had pain on left side of head.  Denies any ear pain.  Denies any trauma.  Pain is intermittent, worse with movement at times.  No rash.    Had old  muscle relaxant, unsure of name, and tried taking but did not notice any resolution    Past Medical History:   Diagnosis Date     Anxiety      Diffuse cystic mastopathy      Disturbances of sulphur-bearing amino-acid metabolism      ECZEMATOUS DERMATITIS      MILD MITRAL VALVE INSUFFUCIENCY      Osteopenia      Other and unspecified hyperlipidemia      Personal history of colonic polyps     Hyperplastic     Rotator cuff (capsule) sprain      right with 3mm supraspinatous tendon tear     Synovial cyst of popliteal space 03    right     TIA (transient ischemic attack) 16     Vitamin D deficiency disease 2013     Current Outpatient Medications   Medication Sig Dispense Refill     pravastatin (PRAVACHOL) 80 MG tablet Take 1 tablet by mouth. at bedtime. 90 tablet 3     vitamin D3 (CHOLECALCIFEROL) 2000 units (50 mcg) tablet 1 tab  daily 90 tablet 3     aspirin (ASA) 81 MG tablet Take 1 tablet (81 mg) by mouth daily (Patient not taking: Reported on 2021)       LORazepam (ATIVAN) 0.5 MG tablet Take 1 tablet (0.5 mg) by mouth daily as needed for anxiety (Patient not taking: Reported on 2021) 30 tablet 0     Social History     Tobacco Use     Smoking status: Never Smoker     Smokeless tobacco: Never Used   Substance Use Topics     Alcohol use: Yes     Comment: occ       ROS:  Review of systems negative except as stated above.    EXAM:   /71   Pulse 59   Temp 97  F (36.1  C)   Wt 73.2 kg (161  lb 6.4 oz)   SpO2 99%   BMI 27.28 kg/m    M/S Exam:neck - mild tenderness to palpation along sternocleidomastoid muscles, no tenderness on cervical spine, decrease ROM.  GENERAL APPEARANCE: healthy, alert and no distress  EARS: ear canal and TM normal  EXTREMITIES: peripheral pulses normal  SKIN: no suspicious lesions or rashes on face, neck or scalp  PSYCH: alert, affect bright    X-RAY was not done.    ASSESSMENT/PLAN:  (M79.12) Sternocleidomastoid muscle tenderness  (primary encounter diagnosis)  Comment: left  Plan: tiZANidine (ZANAFLEX) 2 MG tablet            (G44.209) Tension headache  Plan: tiZANidine (ZANAFLEX) 2 MG tablet            Patient appears well, non-toxic and no acute distress.  Discussed that symptom presentation most likely due to neck muscle etiology - RX zanaflex given to try to help with tension headache and neck muscle spasm.  Okay for tylenol and ibuprofen for discomfort, warm compress to area    Follow up with primary provider if no improvement of symptoms in 1 week    Roland Allen MD  September 18, 2021 2:14 PM

## 2021-09-29 ENCOUNTER — MYC MEDICAL ADVICE (OUTPATIENT)
Dept: INTERNAL MEDICINE | Facility: CLINIC | Age: 81
End: 2021-09-29

## 2021-10-21 ENCOUNTER — TELEPHONE (OUTPATIENT)
Dept: INTERNAL MEDICINE | Facility: CLINIC | Age: 81
End: 2021-10-21

## 2021-11-15 ENCOUNTER — ANCILLARY PROCEDURE (OUTPATIENT)
Dept: MAMMOGRAPHY | Facility: CLINIC | Age: 81
End: 2021-11-15
Payer: COMMERCIAL

## 2021-11-15 ENCOUNTER — OFFICE VISIT (OUTPATIENT)
Dept: INTERNAL MEDICINE | Facility: CLINIC | Age: 81
End: 2021-11-15
Payer: COMMERCIAL

## 2021-11-15 VITALS
TEMPERATURE: 97.5 F | HEIGHT: 65 IN | WEIGHT: 161 LBS | BODY MASS INDEX: 26.82 KG/M2 | HEART RATE: 63 BPM | RESPIRATION RATE: 16 BRPM | OXYGEN SATURATION: 96 % | SYSTOLIC BLOOD PRESSURE: 132 MMHG | DIASTOLIC BLOOD PRESSURE: 80 MMHG

## 2021-11-15 DIAGNOSIS — M19.90 OSTEOARTHRITIS, UNSPECIFIED OSTEOARTHRITIS TYPE, UNSPECIFIED SITE: ICD-10-CM

## 2021-11-15 DIAGNOSIS — Z12.31 VISIT FOR SCREENING MAMMOGRAM: ICD-10-CM

## 2021-11-15 DIAGNOSIS — E78.5 HYPERLIPIDEMIA LDL GOAL <100: ICD-10-CM

## 2021-11-15 DIAGNOSIS — Z23 HIGH PRIORITY FOR 2019-NCOV VACCINE: ICD-10-CM

## 2021-11-15 DIAGNOSIS — R35.0 URINE FREQUENCY: ICD-10-CM

## 2021-11-15 DIAGNOSIS — L65.9 ALOPECIA: ICD-10-CM

## 2021-11-15 DIAGNOSIS — M85.80 OSTEOPENIA, UNSPECIFIED LOCATION: ICD-10-CM

## 2021-11-15 DIAGNOSIS — Z00.00 MEDICARE ANNUAL WELLNESS VISIT, SUBSEQUENT: ICD-10-CM

## 2021-11-15 DIAGNOSIS — J30.0 VASOMOTOR RHINITIS: ICD-10-CM

## 2021-11-15 LAB
ALBUMIN SERPL-MCNC: 3.6 G/DL (ref 3.4–5)
ALP SERPL-CCNC: 70 U/L (ref 40–150)
ALT SERPL W P-5'-P-CCNC: 14 U/L (ref 0–50)
ANION GAP SERPL CALCULATED.3IONS-SCNC: 1 MMOL/L (ref 3–14)
AST SERPL W P-5'-P-CCNC: 13 U/L (ref 0–45)
BILIRUB SERPL-MCNC: 0.5 MG/DL (ref 0.2–1.3)
BUN SERPL-MCNC: 12 MG/DL (ref 7–30)
CALCIUM SERPL-MCNC: 9.2 MG/DL (ref 8.5–10.1)
CHLORIDE BLD-SCNC: 108 MMOL/L (ref 94–109)
CHOLEST SERPL-MCNC: 189 MG/DL
CO2 SERPL-SCNC: 31 MMOL/L (ref 20–32)
CREAT SERPL-MCNC: 0.77 MG/DL (ref 0.52–1.04)
FASTING STATUS PATIENT QL REPORTED: YES
GFR SERPL CREATININE-BSD FRML MDRD: 73 ML/MIN/1.73M2
GLUCOSE BLD-MCNC: 88 MG/DL (ref 70–99)
HDLC SERPL-MCNC: 61 MG/DL
LDLC SERPL CALC-MCNC: 111 MG/DL
NONHDLC SERPL-MCNC: 128 MG/DL
POTASSIUM BLD-SCNC: 3.9 MMOL/L (ref 3.4–5.3)
PROT SERPL-MCNC: 7.5 G/DL (ref 6.8–8.8)
SODIUM SERPL-SCNC: 140 MMOL/L (ref 133–144)
TRIGL SERPL-MCNC: 83 MG/DL

## 2021-11-15 PROCEDURE — 36415 COLL VENOUS BLD VENIPUNCTURE: CPT | Performed by: INTERNAL MEDICINE

## 2021-11-15 PROCEDURE — 99213 OFFICE O/P EST LOW 20 MIN: CPT | Mod: 25 | Performed by: INTERNAL MEDICINE

## 2021-11-15 PROCEDURE — 80061 LIPID PANEL: CPT | Performed by: INTERNAL MEDICINE

## 2021-11-15 PROCEDURE — 91300 COVID-19,PF,PFIZER (12+ YRS): CPT | Performed by: INTERNAL MEDICINE

## 2021-11-15 PROCEDURE — 77063 BREAST TOMOSYNTHESIS BI: CPT | Mod: TC | Performed by: RADIOLOGY

## 2021-11-15 PROCEDURE — 77067 SCR MAMMO BI INCL CAD: CPT | Mod: TC | Performed by: RADIOLOGY

## 2021-11-15 PROCEDURE — 80053 COMPREHEN METABOLIC PANEL: CPT | Performed by: INTERNAL MEDICINE

## 2021-11-15 PROCEDURE — 99397 PER PM REEVAL EST PAT 65+ YR: CPT | Mod: 25 | Performed by: INTERNAL MEDICINE

## 2021-11-15 PROCEDURE — 0004A COVID-19,PF,PFIZER (12+ YRS): CPT | Performed by: INTERNAL MEDICINE

## 2021-11-15 ASSESSMENT — ENCOUNTER SYMPTOMS
CHILLS: 0
WEAKNESS: 0
COUGH: 0
ABDOMINAL PAIN: 0
MYALGIAS: 1
EYE PAIN: 0
DIZZINESS: 0
JOINT SWELLING: 0
FEVER: 0
BREAST MASS: 0
HEADACHES: 0
FREQUENCY: 1
HEMATURIA: 0
ARTHRALGIAS: 1
NAUSEA: 0
SHORTNESS OF BREATH: 0
CONSTIPATION: 0
PALPITATIONS: 0
HEARTBURN: 0
PARESTHESIAS: 0
HEMATOCHEZIA: 0
DYSURIA: 0
DIARRHEA: 0
SORE THROAT: 0
NERVOUS/ANXIOUS: 0

## 2021-11-15 ASSESSMENT — PATIENT HEALTH QUESTIONNAIRE - PHQ9: 5. POOR APPETITE OR OVEREATING: NOT AT ALL

## 2021-11-15 ASSESSMENT — ANXIETY QUESTIONNAIRES
1. FEELING NERVOUS, ANXIOUS, OR ON EDGE: SEVERAL DAYS
3. WORRYING TOO MUCH ABOUT DIFFERENT THINGS: NOT AT ALL
IF YOU CHECKED OFF ANY PROBLEMS ON THIS QUESTIONNAIRE, HOW DIFFICULT HAVE THESE PROBLEMS MADE IT FOR YOU TO DO YOUR WORK, TAKE CARE OF THINGS AT HOME, OR GET ALONG WITH OTHER PEOPLE: NOT DIFFICULT AT ALL
5. BEING SO RESTLESS THAT IT IS HARD TO SIT STILL: NOT AT ALL
7. FEELING AFRAID AS IF SOMETHING AWFUL MIGHT HAPPEN: NOT AT ALL
GAD7 TOTAL SCORE: 1
2. NOT BEING ABLE TO STOP OR CONTROL WORRYING: NOT AT ALL
6. BECOMING EASILY ANNOYED OR IRRITABLE: NOT AT ALL

## 2021-11-15 ASSESSMENT — MIFFLIN-ST. JEOR: SCORE: 1193.23

## 2021-11-15 ASSESSMENT — ACTIVITIES OF DAILY LIVING (ADL): CURRENT_FUNCTION: NO ASSISTANCE NEEDED

## 2021-11-15 NOTE — PATIENT INSTRUCTIONS
Covid booster vaccine today  Labs as ordered  Continue current meds.  Pravastatin refilled   Stop  coffee and chocolate intake for 1 week to see if helps urine frequency. If not helping, possible oral medication therapy but with some risk for dry mouth and mild constipation. If wish to use, then let me know  If nasal drainage bothersome, then option of prescription nasal spray twice a day or use Kleenex  If hair  issues bothersome, then ? trial of Rogaine topical over the counter. If not helpful and symptoms worsen, then contact clinic and will refer on to dermatology  Would consider flu vaccination  Check with insurance or speak with your pharmacist re: Shingrix vaccine coverage for shingles prevention.  This is a 2 shot series done 2-6 months apart  Bone density (DEXA). This will be done at the [facility]. Call [phone] or use [employer] to schedule.   Pt was informed regarding extra E&M billing for management of new or established medical issues not related to today's wellness visit

## 2021-11-15 NOTE — PROGRESS NOTES
"SUBJECTIVE:   Liana Giraldo is a 81 year old female who presents for Preventive Visit and follow-up regarding hyperlipidemia      Patient has been advised of split billing requirements and indicates understanding: Yes   Are you in the first 12 months of your Medicare coverage?  No    Healthy Habits:     In general, how would you rate your overall health?  Good    Frequency of exercise:  1 day/week    Duration of exercise:  15-30 minutes    Do you usually eat at least 4 servings of fruit and vegetables a day, include whole grains    & fiber and avoid regularly eating high fat or \"junk\" foods?  Yes    Taking medications regularly:  Yes    Medication side effects:  None    Ability to successfully perform activities of daily living:  No assistance needed    Home Safety:  Throw rugs in the hallway    Hearing Impairment:  No hearing concerns    In the past 6 months, have you been bothered by leaking of urine? Yes    In general, how would you rate your overall mental or emotional health?  Good      PHQ-2 Total Score: 0    Additional concerns today:  Yes    Do you feel safe in your environment? Yes    Have you ever done Advance Care Planning? (For example, a Health Directive, POLST, or a discussion with a medical provider or your loved ones about your wishes): Yes, advance care planning is on file.       Fall risk  Fallen 2 or more times in the past year?: No  Any fall with injury in the past year?: No    Cognitive Screening:Patient Declined Screening      Mini-CogTM Thomas Hassan. Licensed by the author for use in [facility]; reprinted with permission ([email or web address]). All rights reserved.      Do you have sleep apnea, excessive snoring or daytime drowsiness?: no    Reviewed and updated as needed this visit by clinical staff                Reviewed and updated as needed this visit by Provider               Social History     Tobacco Use     Smoking status: Never Smoker     Smokeless tobacco: Never Used "   Substance Use Topics     Alcohol use: Yes     Comment: occ     If you drink alcohol do you typically have >3 drinks per day or >7 drinks per week? No    Alcohol Use 11/15/2021   Prescreen: >3 drinks/day or >7 drinks/week? No   Prescreen: >3 drinks/day or >7 drinks/week? -         Current providers sharing in care for this patient include:   Patient Care Team:  Dariel Weber MD as PCP - Lawrence Iverson MD as Assigned PCP    The following health maintenance items are reviewed in Epic and correct as of today:  Health Maintenance Due   Topic Date Due     ANNUAL REVIEW OF HM ORDERS  Never done     ZOSTER IMMUNIZATION (2 of 3) 04/10/2009     COVID-19 Vaccine (3 - Booster for Pfizer series) 09/16/2021     LIPID  10/20/2021     FALL RISK ASSESSMENT  10/20/2021     MEDICARE ANNUAL WELLNESS VISIT  10/20/2021     Labs reviewed in EPIC      Mammogram Screening - Patient over age 75, has elected to continue with screening.  Pertinent mammograms are reviewed under the imaging tab.    Review of Systems   Constitutional: Negative for chills and fever.   HENT: Negative for congestion, ear pain, hearing loss and sore throat.    Eyes: Negative for pain and visual disturbance.   Respiratory: Negative for cough and shortness of breath.    Cardiovascular: Negative for chest pain, palpitations and peripheral edema.   Gastrointestinal: Negative for abdominal pain, constipation, diarrhea, heartburn, hematochezia and nausea.   Breasts:  Negative for tenderness, breast mass and discharge.   Genitourinary: Positive for frequency. Negative for dysuria, genital sores, hematuria, pelvic pain, urgency, vaginal bleeding and vaginal discharge.   Musculoskeletal: Positive for arthralgias and myalgias. Negative for joint swelling.   Skin: Negative for rash.   Neurological: Negative for dizziness, weakness, headaches and paresthesias.   Psychiatric/Behavioral: Negative for mood changes. The patient is not nervous/anxious.       Weight down  "3 pounds   Eye exam summer 2021. Has readers   Right  Side rhinorrhea chronic. Will occur with caffeine. Not bothersome to pt to use meds now   Has some urine frequency. Has a mug  of decaf  Coffee in AM and some other part of the day. Has chocalate. Nocturia x2. Limiting  Fluids after supper. ETOH x1/week    No dysuria    Has some soreness in  Hands with osteoarthritis   Very rare use of lorazepam with anxiety.  Mood okay now. BELKIS = 1  States that her hair has been thinning some in general.  No alopecia patches.  Denies scalp rash or itching.  Symptoms present for over a year per patient.  No acute worsening per patient.  Thyroid function normal 1 year ago when symptoms present also    OBJECTIVE:   /80   Pulse 63   Temp 97.5  F (36.4  C) (Temporal)   Resp 16   Ht 1.638 m (5' 4.5\")   Wt 73 kg (161 lb)   SpO2 96%   BMI 27.21 kg/m   Estimated body mass index is 27.28 kg/m  as calculated from the following:    Height as of 2/24/21: 1.638 m (5' 4.5\").    Weight as of 9/18/21: 73.2 kg (161 lb 6.4 oz).  Physical Exam  General appearance -  Alert, no distress. Calm  Skin - No rashes or lesions.  Minimal thinning of hair follicle density in general.  No rash on the scalp.  No localized alopecia patches  Head - normocephalic, atraumatic  Eyes - RAFI, EOMI, fundi exam with nondilated pupils negative.  Ears - External ears normal. Canals clear. TM's normal.  Nose/Sinuses - Nares normal. Septum midline. Mucosa normal. No drainage or sinus tenderness.  Oropharynx - No erythema, no adenopathy, no exudates.  Neck - Supple without adenopathy or thyromegaly. No bruits.  Lungs - Clear to auscultation without wheezes/rhonchi.  Heart - Regular rate and rhythm without murmurs, clicks, or gallops.  Nodes - No supraclavicular, axillary, or inguinal adenopathy palpable.  Breasts - deferred  Abdomen - Abdomen soft, non-tender. BS normal. No masses or hepatosplenomegaly palpable. No bruits.  Extremities -No cyanosis, clubbing " or edema.    Musculoskeletal - Spine ROM normal. Muscular strength intact.  DIP and PIP joints hands with osteoarthritis changes.  No erythema or increased warmth  Peripheral pulses - radial=4/4, femoral=4/4, posterior tibial=4/4, dorsalis pedis=4/4,  Neuro - Gait normal. Reflexes normal and symmetric. Sensation grossly WNL.  Genital/Rectal - deferred        ASSESSMENT / PLAN:   1. Medicare annual wellness visit, subsequent   Covid booster vaccination today.  Discussed option of Shingrix.  Patient declines flu vaccination despite MD recommendation.  Had mammogram earlier this morning.  DEXA discussion below.  Other healthcare maintenance up-to-date    2. Hyperlipidemia LDL goal <100  Previous LDL minimally above goal.  Has failed previous trial of Zetia.  Intolerant of other statin trials.  Previous TIA.  Previous carotid imaging reviewed.  No history of CAD.  Continue current statin pending lab results  - Comprehensive metabolic panel  - Lipid panel reflex to direct LDL Fasting  - pravastatin (PRAVACHOL) 80 MG tablet; Take 1 tablet by mouth. at bedtime.  Dispense: 90 tablet; Refill: 3    3. Osteoarthritis, unspecified osteoarthritis type, unspecified site  Mild.  Not affecting overall activity in the day.  Tylenol as needed.  Discussed stretching the joints in the morning under heat    4. Vasomotor rhinitis  Discussed option of azelastine or atrovent nasal sprays.  Not bothersome enough to patient that she wishes to use prescription treatment at this time and will monitor    5. Urine frequency  Discussed options to improve lifestyle with caffeine reduction to see if helps symptoms.  No history of diabetes and denies dysuria.  Reviewed anticholinergic medication options and pros/cons of treatment.  Patient prefers to try lifestyle changes first    6. Alopecia  Symptoms present for over a year per patient and thyroid function normal 1 year ago.   Clinically euthyroid currently.  Scalp rash.  Discussed option of  "Rogaine topically.  If used and not helpful, patient to inform physician and will refer on to dermatology    7. Osteopenia, unspecified location  Due for DEXA follow-up as previously ordered.  Patient will schedule appointment.  On vitamin D supplement with previous testing on supplement showing normal vitamin D    8. High priority for 2019-nCoV vaccine  - COVID-19,PF,PFIZER (12+ Yrs PURPLE LABEL)      Patient has been advised of split billing requirements and indicates understanding: Yes       COUNSELING:  Reviewed preventive health counseling, as reflected in patient instructions    Estimated body mass index is 27.28 kg/m  as calculated from the following:    Height as of 2/24/21: 1.638 m (5' 4.5\").    Weight as of 9/18/21: 73.2 kg (161 lb 6.4 oz).        She reports that she has never smoked. She has never used smokeless tobacco.      Appropriate preventive services were discussed with this patient, including applicable screening as appropriate for cardiovascular disease, diabetes, osteopenia/osteoporosis, and glaucoma.  As appropriate for age/gender, discussed screening for colorectal cancer, prostate cancer, breast cancer, and cervical cancer. Checklist reviewing preventive services available has been given to the patient.    Reviewed patients plan of care and provided an AVS. The Basic Care Plan (routine screening as documented in Health Maintenance) for Liana meets the Care Plan requirement. This Care Plan has been established and reviewed with the Patient.    Counseling Resources:  ATP IV Guidelines  Pooled Cohorts Equation Calculator  Breast Cancer Risk Calculator  Breast Cancer: Medication to Reduce Risk  FRAX Risk Assessment  ICSI Preventive Guidelines  Dietary Guidelines for Americans, 2010  USDA's MyPlate  ASA Prophylaxis  Lung CA Screening      PLAN:   Covid booster vaccine today  Labs as ordered  Continue current meds.  Pravastatin refilled   Stop  coffee and chocolate intake for 1 week to see if " helps urine frequency. If not helping, possible oral medication therapy but with some risk for dry mouth and mild constipation. If wish to use, then let me know  If nasal drainage bothersome, then option of prescription nasal spray twice a day or use Kleenex  If hair  issues bothersome, then ? trial of Rogaine topical over the counter. If not helpful and symptoms worsen, then contact clinic and will refer on to dermatology  Would consider flu vaccination (patient declines today)  Check with insurance or speak with your pharmacist re: Shingrix vaccine coverage for shingles prevention.  This is a 2 shot series done 2-6 months apart  Bone density (DEXA). This [facility] be done at the [facility]. Call [phone] or use [email or web address] to schedule.   Pt was informed regarding extra E&M billing for management of new or established medical issues not related to today's wellness visit        Dariel Weber MD  [facility]

## 2021-11-16 ASSESSMENT — ANXIETY QUESTIONNAIRES: GAD7 TOTAL SCORE: 1

## 2021-11-17 RX ORDER — PRAVASTATIN SODIUM 80 MG/1
TABLET ORAL
Qty: 90 TABLET | Refills: 3 | Status: SHIPPED | OUTPATIENT
Start: 2021-11-17 | End: 2023-01-05

## 2021-12-24 NOTE — PATIENT INSTRUCTIONS
Continue current medications.  Lorazepam refilled to use on rare basis.  If fine but you have to use this more often than twice a month, then contact clinic and will consider counseling versus different medication use to prevent anxiety.  Discussed relaxation techniques  Continue pravastatin  Call  [phone] or use [employer] to schedule a future lab appointment  fasting in October   For fasting labs, please refrain from eating for 8 hours or more.   Drink 2 glasses of water before your lab appointment. It is fine to take your  oral medications on the morning of the lab test as usual

## 2022-03-18 ENCOUNTER — OFFICE VISIT (OUTPATIENT)
Dept: URGENT CARE | Facility: URGENT CARE | Age: 82
End: 2022-03-18
Payer: COMMERCIAL

## 2022-03-18 VITALS
TEMPERATURE: 98.9 F | SYSTOLIC BLOOD PRESSURE: 126 MMHG | BODY MASS INDEX: 26.74 KG/M2 | DIASTOLIC BLOOD PRESSURE: 69 MMHG | OXYGEN SATURATION: 98 % | WEIGHT: 158.2 LBS | HEART RATE: 65 BPM

## 2022-03-18 DIAGNOSIS — N30.01 ACUTE CYSTITIS WITH HEMATURIA: Primary | ICD-10-CM

## 2022-03-18 DIAGNOSIS — R35.0 URINARY FREQUENCY: ICD-10-CM

## 2022-03-18 LAB
ALBUMIN UR-MCNC: NEGATIVE MG/DL
APPEARANCE UR: CLEAR
BACTERIA #/AREA URNS HPF: ABNORMAL /HPF
BILIRUB UR QL STRIP: NEGATIVE
COLOR UR AUTO: YELLOW
GLUCOSE UR STRIP-MCNC: NEGATIVE MG/DL
HGB UR QL STRIP: ABNORMAL
KETONES UR STRIP-MCNC: NEGATIVE MG/DL
LEUKOCYTE ESTERASE UR QL STRIP: ABNORMAL
MUCOUS THREADS #/AREA URNS LPF: PRESENT /LPF
NITRATE UR QL: NEGATIVE
PH UR STRIP: 5.5 [PH] (ref 5–7)
RBC #/AREA URNS AUTO: ABNORMAL /HPF
SP GR UR STRIP: 1.01 (ref 1–1.03)
SQUAMOUS #/AREA URNS AUTO: ABNORMAL /LPF
UROBILINOGEN UR STRIP-ACNC: 0.2 E.U./DL
WBC #/AREA URNS AUTO: ABNORMAL /HPF

## 2022-03-18 PROCEDURE — 99214 OFFICE O/P EST MOD 30 MIN: CPT | Performed by: PHYSICIAN ASSISTANT

## 2022-03-18 PROCEDURE — 81001 URINALYSIS AUTO W/SCOPE: CPT | Performed by: PHYSICIAN ASSISTANT

## 2022-03-18 RX ORDER — CEPHALEXIN 500 MG/1
500 CAPSULE ORAL 2 TIMES DAILY
Qty: 14 CAPSULE | Refills: 0 | Status: SHIPPED | OUTPATIENT
Start: 2022-03-18 | End: 2022-03-25

## 2022-03-18 NOTE — PROGRESS NOTES
"    ASSESSMENT/PLAN:    (N30.01) Acute cystitis with hematuria  (primary encounter diagnosis)    MDM: Acute Cystitis. Home treatment x an estimated 4 doses of unknown antibiotic (possibley Bactrim DS). No fever, chills, back or flank pain to increase suspicion for upper tract infection at this time.  Today's lab findings have to be interpreted in context of patient's prior self-start of antibiotics.     Plan: cephALEXin (KEFLEX) 500 MG capsule    -Patient is educated about potential dangers of not finishing prescribed antibiotics and about taking \"old\" antibiotics without medical advise.     -discharge plan, as per below, was reviewed with patient verbally and provided in printed form for her home review.           March 18, 2022  Urgent Care Plan:     1. Drink extra water to flush your bladder     2. Start the antibiotics I prescribed today     3.  Take the  full course of antibiotic as prescribed.     4.  Follow-up with your primary care provider  if your symptoms do not improve after 4-6 doses  of antibiotic.     5. Follow up immediately if your symptoms worsen, if you develop fever, chills, back pain, abdominal pain, nausea, vomiting, weakness or any new symptoms.     ----------------------------------------   Liana Giraldo is a very pleasant 82 year old female who presents to urgent care clinic today for evaluation of dysuria and urinary urgency/frequency x 2 or 3 days duration.     Patient states she had some left over antibiotic (is unable to tell me name of antibiotic--but thinks it was left over from last UTI (4/21--which is listed as Bactrim in chart).  Patient states she stopped taking that medication last infection because the pills were too big/too hard to swallow.     It sounds like she took 4 doses of the unknown above antibiotic (possibly Bactrim DS) prior to today's visit.       Past Medical History:   Diagnosis Date     Anxiety      Diffuse cystic mastopathy      Disturbances of sulphur-bearing " amino-acid metabolism      ECZEMATOUS DERMATITIS      MILD MITRAL VALVE INSUFFUCIENCY 1/02     Osteopenia      Other and unspecified hyperlipidemia      Personal history of colonic polyps 8/05    Hyperplastic     Rotator cuff (capsule) sprain 12/01     right with 3mm supraspinatous tendon tear     Synovial cyst of popliteal space 7/30/03    right     TIA (transient ischemic attack) 2/25/16     Vitamin D deficiency disease 2/23/2013       Current Outpatient Medications   Medication     aspirin (ASA) 81 MG tablet     LORazepam (ATIVAN) 0.5 MG tablet     pravastatin (PRAVACHOL) 80 MG tablet     vitamin D3 (CHOLECALCIFEROL) 2000 units (50 mcg) tablet     No current facility-administered medications for this visit.       Allergies   Allergen Reactions     Lipitor [Atorvastatin Calcium]      Myalgias     Oxycodone Nausea and Vomiting     Rosuvastatin Calcium      muscle achiness     Simvastatin      muscle achiness         ROS:     CONSITUTIONAL: No fever, chills or acute onset weakness  CARDIAC: No acute chest pain or shortness of breath   RESP: No acute cough, chest pain or shortness of breath   GI: No acute onset abdominal pain. No acute onset vomiting/diarrhea  NEURO: No acute onset confusion or mental status changes since urinary symptoms began       OBJECTIVE:  /69 (BP Location: Right arm, Patient Position: Sitting, Cuff Size: Adult Regular)   Pulse 65   Temp 98.9  F (37.2  C)   Wt 71.8 kg (158 lb 3.2 oz)   SpO2 98%   Breastfeeding No   BMI 26.74 kg/m          GENERAL:  Very pleasant, comfortable and generally well appearing.  SKIN: No rashes.  Normal color.  Sclera clear.  CARDIAC:NORMAL - regular rate and rhythm without murmur., normal s1/s2 and without extra heart sounds  RESP: Normal - CTA without rales, rhonchi, or wheezing.  ABDOMEN:  Soft, non-tender, non-distended.  Positive normal bowel sounds.  No HSM or masses.  Positive, mild, suprapubic tenderness.  No CVA tenderness.  NEURO: Alert and  oriented.  Normal speech and mentation.  CN II/XII grossly intact.  Gait within normal limits.      Component      Latest Ref Rng & Units 3/18/2022   Color Urine      Colorless, Straw, Light Yellow, Yellow Yellow   Appearance Urine      Clear Clear   Glucose Urine      Negative mg/dL Negative   Bilirubin Urine      Negative Negative   Ketones Urine      Negative mg/dL Negative   Specific Gravity Urine      1.003 - 1.035 1.015   Blood Urine      Negative Trace (A)   pH Urine      5.0 - 7.0 5.5   Protein Albumin Urine      Negative mg/dL Negative   Urobilinogen Urine      0.2, 1.0 E.U./dL 0.2   Nitrite Urine      Negative Negative   Leukocyte Esterase Urine      Negative Small (A)   Bacteria Urine      None Seen /HPF Few (A)   RBC Urine      0-2 /HPF /HPF 2-5 (A)   WBC Urine      0-5 /HPF /HPF 5-10 (A)   Squamous Epithelial /LPF Urine      None Seen /LPF Moderate (A)   Mucus Urine      None Seen /LPF Present (A)       (R35.0) Urinary frequency  Plan: UA reflex to Microscopic and Culture, Urine         Microscopic

## 2022-03-18 NOTE — PATIENT INSTRUCTIONS

## 2022-10-13 ENCOUNTER — MYC MEDICAL ADVICE (OUTPATIENT)
Dept: INTERNAL MEDICINE | Facility: CLINIC | Age: 82
End: 2022-10-13

## 2022-10-13 DIAGNOSIS — Z00.00 LABORATORY EXAMINATION ORDERED AS PART OF A ROUTINE GENERAL MEDICAL EXAMINATION: ICD-10-CM

## 2022-10-13 DIAGNOSIS — E78.5 HYPERLIPIDEMIA LDL GOAL <100: Primary | ICD-10-CM

## 2022-11-17 ENCOUNTER — LAB (OUTPATIENT)
Dept: LAB | Facility: CLINIC | Age: 82
End: 2022-11-17
Payer: COMMERCIAL

## 2022-11-17 ENCOUNTER — ANCILLARY PROCEDURE (OUTPATIENT)
Dept: MAMMOGRAPHY | Facility: CLINIC | Age: 82
End: 2022-11-17
Attending: INTERNAL MEDICINE
Payer: COMMERCIAL

## 2022-11-17 DIAGNOSIS — Z00.00 LABORATORY EXAMINATION ORDERED AS PART OF A ROUTINE GENERAL MEDICAL EXAMINATION: ICD-10-CM

## 2022-11-17 DIAGNOSIS — Z12.31 VISIT FOR SCREENING MAMMOGRAM: ICD-10-CM

## 2022-11-17 DIAGNOSIS — E78.5 HYPERLIPIDEMIA LDL GOAL <100: ICD-10-CM

## 2022-11-17 LAB
ALBUMIN SERPL BCG-MCNC: 4.3 G/DL (ref 3.5–5.2)
ALP SERPL-CCNC: 70 U/L (ref 35–104)
ALT SERPL W P-5'-P-CCNC: 12 U/L (ref 10–35)
ANION GAP SERPL CALCULATED.3IONS-SCNC: 13 MMOL/L (ref 7–15)
AST SERPL W P-5'-P-CCNC: 19 U/L (ref 10–35)
BILIRUB SERPL-MCNC: 0.7 MG/DL
BUN SERPL-MCNC: 12.2 MG/DL (ref 8–23)
CALCIUM SERPL-MCNC: 9.2 MG/DL (ref 8.8–10.2)
CHLORIDE SERPL-SCNC: 102 MMOL/L (ref 98–107)
CHOLEST SERPL-MCNC: 190 MG/DL
CREAT SERPL-MCNC: 0.8 MG/DL (ref 0.51–0.95)
DEPRECATED HCO3 PLAS-SCNC: 24 MMOL/L (ref 22–29)
ERYTHROCYTE [DISTWIDTH] IN BLOOD BY AUTOMATED COUNT: 12.9 % (ref 10–15)
GFR SERPL CREATININE-BSD FRML MDRD: 74 ML/MIN/1.73M2
GLUCOSE SERPL-MCNC: 94 MG/DL (ref 70–99)
HCT VFR BLD AUTO: 41.6 % (ref 35–47)
HDLC SERPL-MCNC: 65 MG/DL
HGB BLD-MCNC: 13.5 G/DL (ref 11.7–15.7)
LDLC SERPL CALC-MCNC: 104 MG/DL
MCH RBC QN AUTO: 31.1 PG (ref 26.5–33)
MCHC RBC AUTO-ENTMCNC: 32.5 G/DL (ref 31.5–36.5)
MCV RBC AUTO: 96 FL (ref 78–100)
NONHDLC SERPL-MCNC: 125 MG/DL
PLATELET # BLD AUTO: 269 10E3/UL (ref 150–450)
POTASSIUM SERPL-SCNC: 4.4 MMOL/L (ref 3.4–5.3)
PROT SERPL-MCNC: 7.4 G/DL (ref 6.4–8.3)
RBC # BLD AUTO: 4.34 10E6/UL (ref 3.8–5.2)
SODIUM SERPL-SCNC: 139 MMOL/L (ref 136–145)
TRIGL SERPL-MCNC: 106 MG/DL
WBC # BLD AUTO: 5.1 10E3/UL (ref 4–11)

## 2022-11-17 PROCEDURE — 77063 BREAST TOMOSYNTHESIS BI: CPT | Mod: TC | Performed by: RADIOLOGY

## 2022-11-17 PROCEDURE — 80061 LIPID PANEL: CPT

## 2022-11-17 PROCEDURE — 77067 SCR MAMMO BI INCL CAD: CPT | Mod: TC | Performed by: RADIOLOGY

## 2022-11-17 PROCEDURE — 36415 COLL VENOUS BLD VENIPUNCTURE: CPT

## 2022-11-17 PROCEDURE — 80053 COMPREHEN METABOLIC PANEL: CPT

## 2022-11-17 PROCEDURE — 85027 COMPLETE CBC AUTOMATED: CPT

## 2023-01-05 ENCOUNTER — VIRTUAL VISIT (OUTPATIENT)
Dept: INTERNAL MEDICINE | Facility: CLINIC | Age: 83
End: 2023-01-05
Payer: COMMERCIAL

## 2023-01-05 DIAGNOSIS — G62.9 NEUROPATHY: ICD-10-CM

## 2023-01-05 DIAGNOSIS — F41.9 ANXIETY: ICD-10-CM

## 2023-01-05 DIAGNOSIS — E78.5 HYPERLIPIDEMIA LDL GOAL <100: ICD-10-CM

## 2023-01-05 PROCEDURE — 99214 OFFICE O/P EST MOD 30 MIN: CPT | Mod: TEL | Performed by: INTERNAL MEDICINE

## 2023-01-05 RX ORDER — LORAZEPAM 0.5 MG/1
0.5 TABLET ORAL DAILY PRN
Qty: 30 TABLET | Refills: 0 | Status: SHIPPED | OUTPATIENT
Start: 2023-01-05

## 2023-01-05 RX ORDER — PRAVASTATIN SODIUM 80 MG/1
TABLET ORAL
Qty: 90 TABLET | Refills: 3 | Status: SHIPPED | OUTPATIENT
Start: 2023-01-05 | End: 2023-03-18

## 2023-01-05 ASSESSMENT — ENCOUNTER SYMPTOMS
WEAKNESS: 0
PARESTHESIAS: 0
HEARTBURN: 0
DYSURIA: 0
FREQUENCY: 1
DIARRHEA: 0
FEVER: 0
ARTHRALGIAS: 1
DIZZINESS: 0
COUGH: 0
HEADACHES: 0
CONSTIPATION: 0
NERVOUS/ANXIOUS: 0
CHILLS: 0
NAUSEA: 0
HEMATURIA: 0
SORE THROAT: 0
EYE PAIN: 0
ABDOMINAL PAIN: 0
JOINT SWELLING: 0
BREAST MASS: 0
SHORTNESS OF BREATH: 0
HEMATOCHEZIA: 0
PALPITATIONS: 0
MYALGIAS: 1

## 2023-01-05 ASSESSMENT — ACTIVITIES OF DAILY LIVING (ADL): CURRENT_FUNCTION: NO ASSISTANCE NEEDED

## 2023-01-05 NOTE — PROGRESS NOTES
"Liana is a 82 year old   who is being evaluated via a billable telephone visit.      The patient has been notified of following:     \"This telephone visit will be conducted via a call between you and your physician/provider. We have found that certain health care needs can be provided without the need for a physical exam.  This service lets us provide the care you need with a short phone conversation.  If a prescription is necessary we can send it directly to your pharmacy.  If lab work is needed we can place an order for that and you can then stop by our lab to have the test done at a later time.    Telephone visits are billed at different rates depending on your insurance coverage. During this emergency period, for some insurers they may be billed the same as an in-person visit.  Please reach out to your insurance provider with any questions.    If during the course of the call the physician/provider feels a telephone visit is not appropriate, you will not be charged for this service.\"    Patient has given verbal consent for Telephone visit?  Yes    What phone number would you like to be contacted at? [phone]    How would you like to obtain your AVS?  Mychart    ASSESSMENT:   1. Hyperlipidemia LDL goal <100  LDL minimally above goal.  Continue pravastatin and improve diet.  Repeat labs 1 year  - pravastatin (PRAVACHOL) 80 MG tablet; Take 1 tablet by mouth. at bedtime.  Dispense: 90 tablet; Refill: 3  - Lipid panel reflex to direct LDL Fasting; Future  - Comprehensive metabolic panel; Future    2. Anxiety  Last lorazepam prescription filled for 30 tablets in August 2021. [place].  Rare use. No sx today.  Continue medication as needed  - LORazepam (ATIVAN) 0.5 MG tablet; Take 1 tablet (0.5 mg) by mouth daily as needed for anxiety  Dispense: 30 tablet; Refill: 0     3. Neuropathy  Denies back issues.  Previous B12 level low normal.  Patient to try sublingual B12 and see how symptoms respond.  If not " improving in a few months when seen back for a wellness visit, will then pursue further metabolic work-up and consider EMG. LS imaging, etc  - Cyanocobalamin (B-12) 1000 MCG SUBL; Place 1 tablet under the tongue daily            PLAN:   Continue current medications  Prescriptions refilled.    Call  [phone] or use [employer] to schedule a future lab appointment  fasting in 1 year.   For fasting labs, please refrain from eating for 8 hours or more.   Drink 2 glasses of water before your lab appointment. It is fine to take your  oral medications on the morning of the lab test as usual  Follow up in  March/ April 2023 in clinic for a annual Wellness exam, recheck blood pressure status, neuropathy, etc   Start Vitamin B12 1,000mcg SUBLINGUAL tablets and take 1 tab under your tongue daily for your neuropathy symptoms to see if contributing. It is important to use sublingual tabs that dissolve under your tongue rather than regular hard tablets as those may  not be absorbed properly by your stomach due to a probable enzyme deficiency in the stomach.  You may  purchase the sublingual tablets over the counter.     I would recommend a covid booster and influenza/flu  vaccination. You may have them done at any pharmacy. If you wish to have it done at a [facility] pharmacy, then go to [email or web address] to schedule a vaccination appointment    Phone call contact time  Call Started at 2:24pm  Call Ended at 2:40pm  Total minutes: 16 min    (Chart documentation was completed, in part, with [employer] voice-recognition software. Even though reviewed, some grammatical, spelling, and word errors may remain.)    Dariel Weber MD  Internal Medicine Department  [facility]        Subjective:      Most recent lab results reviewed with pt.      Component      Latest Ref Rng & Units 11/17/2022   Sodium      136 - 145 mmol/L 139   Potassium      3.4 - 5.3 mmol/L 4.4   Chloride      98 - 107 mmol/L 102    Carbon Dioxide (CO2)      22 - 29 mmol/L 24   Anion Gap      7 - 15 mmol/L 13   Urea Nitrogen      8.0 - 23.0 mg/dL 12.2   Creatinine      0.51 - 0.95 mg/dL 0.80   Calcium      8.8 - 10.2 mg/dL 9.2   Glucose      70 - 99 mg/dL 94   Alkaline Phosphatase      35 - 104 U/L 70   AST      10 - 35 U/L 19   ALT      10 - 35 U/L 12   Protein Total      6.4 - 8.3 g/dL 7.4   Albumin      3.5 - 5.2 g/dL 4.3   Bilirubin Total      <=1.2 mg/dL 0.7   GFR Estimate      >60 mL/min/1.73m2 74   WBC      4.0 - 11.0 10e3/uL 5.1   RBC Count      3.80 - 5.20 10e6/uL 4.34   Hemoglobin      11.7 - 15.7 g/dL 13.5   Hematocrit      35.0 - 47.0 % 41.6   MCV      78 - 100 fL 96   MCH      26.5 - 33.0 pg 31.1   MCHC      31.5 - 36.5 g/dL 32.5   RDW      10.0 - 15.0 % 12.9   Platelet Count      150 - 450 10e3/uL 269   Cholesterol      <200 mg/dL 190   Triglycerides      <150 mg/dL 106   HDL Cholesterol      >=50 mg/dL 65   LDL Cholesterol Calculated      <=100 mg/dL 104 (H)   Non HDL Cholesterol      <130 mg/dL 125       Denies chest pain, shortness of breath, abdominal pain.  Rare use of lorazepam for anxiety.  Last usage 2 weeks ago.  Due for refill.  Some stress with patient's brother passing away in September.  Has some chronic neuropathy in feet.  Denies foot pain.  Some numbness.  Has been taking hard B12 tablets.  Unsure if has helped.  Denies back pain symptoms currently..  Last B12 level low normal at 345 in 2013           Additional ROS:   Constitutional, HEENT, Cardiovascular, Pulmonary, GI and , Neuro, MSK and Psych review of systems/symptoms are otherwise negative or unchanged from previous, except as noted above.           Objective:  Any reported vitals from today were reviewed in chart. See nursing documentation for details    RESP: No cough, no audible wheezing, able to talk in full sentences  GEN: Normal affect. Normal though content/speech  Remainder of exam unable to be completed due to telephone visits

## 2023-01-05 NOTE — PATIENT INSTRUCTIONS
Continue current medications  Prescriptions refilled.    Call  [phone] or use [employer] to schedule a future lab appointment  fasting in 1 year.   For fasting labs, please refrain from eating for 8 hours or more.   Drink 2 glasses of water before your lab appointment. It is fine to take your  oral medications on the morning of the lab test as usual  Follow up in  March/ April 2023 in clinic for a annual Wellness exam, recheck blood pressure status, etc   Start Vitamin B12 1,000mcg SUBLINGUAL tablets and take 1 tab under your tongue daily for your neuropathy symptoms to see if contributing. It is important to use sublingual tabs that dissolve under your tongue rather than regular hard tablets as those may  not be absorbed properly by your stomach due to a probable enzyme deficiency in the stomach.  You may  purchase the sublingual tablets over the counter.     I would recommend a covid booster and influenza/flu  vaccination. You may have them done at any pharmacy. If you wish to have it done at a [facility] pharmacy, then go to [email or web address] to schedule a vaccination appointment

## 2023-01-08 RX ORDER — MAGNESIUM 200 MG
1 TABLET ORAL DAILY
COMMUNITY
Start: 2023-01-08 | End: 2023-08-23

## 2023-03-17 ENCOUNTER — OFFICE VISIT (OUTPATIENT)
Dept: INTERNAL MEDICINE | Facility: CLINIC | Age: 83
End: 2023-03-17
Payer: COMMERCIAL

## 2023-03-17 VITALS
SYSTOLIC BLOOD PRESSURE: 128 MMHG | HEART RATE: 58 BPM | TEMPERATURE: 98.2 F | WEIGHT: 158.2 LBS | BODY MASS INDEX: 26.74 KG/M2 | OXYGEN SATURATION: 98 % | DIASTOLIC BLOOD PRESSURE: 84 MMHG

## 2023-03-17 DIAGNOSIS — Z78.0 ASYMPTOMATIC MENOPAUSAL STATE: ICD-10-CM

## 2023-03-17 DIAGNOSIS — Z12.31 ENCOUNTER FOR SCREENING MAMMOGRAM FOR BREAST CANCER: ICD-10-CM

## 2023-03-17 DIAGNOSIS — Z00.00 MEDICARE ANNUAL WELLNESS VISIT, SUBSEQUENT: ICD-10-CM

## 2023-03-17 DIAGNOSIS — E78.5 HYPERLIPIDEMIA LDL GOAL <100: ICD-10-CM

## 2023-03-17 PROCEDURE — G0439 PPPS, SUBSEQ VISIT: HCPCS | Performed by: INTERNAL MEDICINE

## 2023-03-17 ASSESSMENT — ENCOUNTER SYMPTOMS
ARTHRALGIAS: 1
WEAKNESS: 0
CONSTIPATION: 1
PALPITATIONS: 0
EYE PAIN: 0
NERVOUS/ANXIOUS: 1
PARESTHESIAS: 0
HEADACHES: 0
DIZZINESS: 0
FEVER: 0
HEMATOCHEZIA: 0
SORE THROAT: 0
DYSURIA: 0
FREQUENCY: 1
HEMATURIA: 0
BREAST MASS: 0
COUGH: 0
MYALGIAS: 1
SHORTNESS OF BREATH: 0
JOINT SWELLING: 1
CHILLS: 0
HEARTBURN: 0
ABDOMINAL PAIN: 0
DIARRHEA: 0
NAUSEA: 0

## 2023-03-17 ASSESSMENT — ACTIVITIES OF DAILY LIVING (ADL): CURRENT_FUNCTION: NO ASSISTANCE NEEDED

## 2023-03-17 NOTE — PATIENT INSTRUCTIONS
Continue current medications  Call  [phone] or use [employer] to schedule a future lab appointment  fasting in 1 year.   For fasting labs, please refrain from eating for 8 hours or more.   Drink 2 glasses of water before your lab appointment. It is fine to take your  oral medications on the morning of the lab test as usual  See me a few days later or earlier as needed  Bone density (DEXA)  in the next few months. Mammogram 11/18/23 or later    This will be done at the [facility] or may check to see if option at [place]. Call [phone] or use [employer]ly to schedule.     Shingrix vaccine coverage for shingles prevention.  This is a 2 shot series done 2-6 months apart. Get at pharmacy

## 2023-03-17 NOTE — PROGRESS NOTES
"SUBJECTIVE:   Liana is a 83 year old who presents for Preventive Visit.   Chronic medical issues recently addressed at appointment 1/5/2023      Patient has been advised of split billing requirements and indicates understanding: Yes     Are you in the first 12 months of your Medicare coverage?  No    Healthy Habits:     In general, how would you rate your overall health?  Good    Frequency of exercise:  1 day/week    Duration of exercise:  Less than 15 minutes    Do you usually eat at least 4 servings of fruit and vegetables a day, include whole grains    & fiber and avoid regularly eating high fat or \"junk\" foods?  Yes    Taking medications regularly:  Yes    Medication side effects:  Not applicable    Ability to successfully perform activities of daily living:  No assistance needed    Home Safety:  No safety concerns identified    Hearing Impairment:  No hearing concerns    In the past 6 months, have you been bothered by leaking of urine? Yes    In general, how would you rate your overall mental or emotional health?  Good      PHQ-2 Total Score: 0    Additional concerns today:  Yes      Have you ever done Advance Care Planning? (For example, a Health Directive, POLST, or a discussion with a medical provider or your loved ones about your wishes): Yes, advance care planning is on file.       Fall risk  Fallen 2 or more times in the past year?: No  Any fall with injury in the past year?: No  click delete button to remove this line now  Cognitive Screening   1) Repeat 3 items (Leader, Season, Table)    2) Clock draw: NORMAL  3) 3 item recall: Recalls 3 objects  Results: 3 items recalled: COGNITIVE IMPAIRMENT LESS LIKELY    Mini-CogTM Copyright BRITT Hassan. Licensed by the author for use in [facility]; reprinted with permission (alexsander@.[facility]). All rights reserved.      Do you have sleep apnea, excessive snoring or daytime drowsiness?: no    Reviewed and updated as needed this visit by clinical staff           "        Reviewed and updated as needed this visit by Provider                 Social History     Tobacco Use     Smoking status: Never     Smokeless tobacco: Never   Substance Use Topics     Alcohol use: Yes     Comment: occ         Alcohol Use 3/17/2023   Prescreen: >3 drinks/day or >7 drinks/week? No               Current providers sharing in care for this patient include:   Patient Care Team:  Dariel Weber MD as PCP - General  Dariel Weber MD as Assigned PCP    The following health maintenance items are reviewed in Epic and correct as of today:  Health Maintenance   Topic Date Due     ANNUAL REVIEW OF HM ORDERS  Never done     ZOSTER IMMUNIZATION (2 of 3) 04/10/2009     MEDICARE ANNUAL WELLNESS VISIT  11/15/2022     LIPID  11/17/2023     FALL RISK ASSESSMENT  03/17/2024     DEXA  07/13/2026     DTAP/TDAP/TD IMMUNIZATION (3 - Td or Tdap) 03/20/2027     ADVANCE CARE PLANNING  01/05/2028     PHQ-2 (once per calendar year)  Completed     Pneumococcal Vaccine: 65+ Years  Completed     COVID-19 Vaccine  Completed     IPV IMMUNIZATION  Aged Out     MENINGITIS IMMUNIZATION  Aged Out     INFLUENZA VACCINE  Discontinued     Labs reviewed in EPIC      Mammogram Screening - Patient over age 75, has elected to continue with screening.  Pertinent mammograms are reviewed under the imaging tab.    Review of Systems  CONSTITUTIONAL: NEGATIVE for fever, chills, change in weight  INTEGUMENTARY/SKIN: NEGATIVE for worrisome rashes, moles or lesions  EYES: NEGATIVE for vision changes or irritation  ENT/MOUTH: NEGATIVE for ear, mouth and throat problems  RESP: NEGATIVE for significant cough or SOB  BREAST: NEGATIVE for masses, tenderness or discharge  CV: NEGATIVE for chest pain, palpitations. Minimal edema. No orthopnea, PND  GI: NEGATIVE for nausea, abdominal pain, heartburn, or change in bowel habits  : NEGATIVE for frequency, dysuria, or hematuria  MUSCULOSKELETAL: NEGATIVE for significant arthralgias or myalgia. Has bilateral  "bilateral bunions.  Intermittent right knee pain.  Patient declines further evaluation or treatment at this time  NEURO: NEGATIVE for weakness, dizziness. Mild stable neuropathy  ENDOCRINE: NEGATIVE for temperature intolerance. On statin  HEME: NEGATIVE for bleeding problems  PSYCHIATRIC: NEGATIVE for changes in mood or affect overall.  Rare use     OBJECTIVE:   /84   Pulse 58   Temp 98.2  F (36.8  C) (Temporal)   Wt 71.8 kg (158 lb 3.2 oz)   LMP  (LMP Unknown)   SpO2 98%   BMI 26.74 kg/m   Estimated body mass index is 26.74 kg/m  as calculated from the following:    Height as of 11/15/21: 1.638 m (5' 4.5\").    Weight as of this encounter: 71.8 kg (158 lb 3.2 oz).  Physical Exam   General appearance - alert, no distress  Skin - No rashes or lesions.  Head - normocephalic, atraumatic  Eyes - RAFI, EOMI, fundi exam with nondilated pupils negative.  Ears - External ears normal. Canals   with minimal nonobstructing cerumen.  TM's normal.  Nose/Sinuses - Nares normal. Septum midline. Mucosa normal. No drainage or sinus tenderness.  Oropharynx - No erythema, no adenopathy, no exudates.  Neck - Supple without adenopathy or thyromegaly. No bruits.  Lungs - Clear to auscultation without wheezes/rhonchi.  Heart - Regular rate and rhythm without murmurs, clicks, or gallops.  Nodes - No supraclavicular, axillary, or inguinal adenopathy palpable.  Breasts - deferred  Abdomen - Abdomen soft, non-tender. BS normal. No masses or hepatosplenomegaly palpable. No bruits.  Extremities -No cyanosis, clubbing. Mild BLE edema.    Musculoskeletal - Spine ROM normal. Muscular strength intact.  Bilateral bunion 1st MTP.   Peripheral pulses - radial=4/4, femoral=4/4, posterior tibial=4/4, dorsalis pedis=4/4,  Neuro - Gait normal. Reflexes normal and symmetric. Sensation grossly WNL despite subjective history of mild distal lower extremity neuropathy  Genital/Rectal - deferred      ASSESSMENT / PLAN:   1. Medicare annual wellness " visit, subsequent  Discussed Shingrix vaccination option.  She will get through pharmacy.  Wishes to continue breast cancer screening.  See below.  Other healthcare maintenance up-to-date.  Independent in ADLs.  Has some bilateral bunion formation but declines evaluation and treatment.  Recommended shoes with good arch support    2. Asymptomatic menopausal state  Previous mild osteopenia.  Due for bone density follow-up  - DX Hip/Pelvis/Spine; Future    3. Encounter for screening mammogram for breast cancer  Wishes to continue breast cancer screening.  Will be done 11/18/2023 or later  - *MA Screening Digital Bilateral; Future    4. Hyperlipidemia LDL goal <100  Recent lipids with minimal LDL elevation.  Continuing current medication and will repeat labs 1 year as previously discussed  - pravastatin (PRAVACHOL) 80 MG tablet; Take 1 tablet by mouth. at bedtime.  Dispense: 90 tablet; Refill: 3      Patient has been advised of split billing requirements and indicates understanding: Yes      COUNSELING:  Reviewed preventive health counseling, as reflected in patient instructions        She reports that she has never smoked. She has never used smokeless tobacco.      Appropriate preventive services were discussed with this patient, including applicable screening as appropriate for cardiovascular disease, diabetes, osteopenia/osteoporosis, and glaucoma.  As appropriate for age/gender, discussed screening for colorectal cancer, prostate cancer, breast cancer, and cervical cancer. Checklist reviewing preventive services available has been given to the patient.    Reviewed patients plan of care and provided an AVS. The Basic Care Plan (routine screening as documented in Health Maintenance) for Liana meets the Care Plan requirement. This Care Plan has been established and reviewed with the Patient.        PLAN:  Continue current medications  Call  [phone] or use [employer] to schedule a future lab appointment  fasting in 1  year.   For fasting labs, please refrain from eating for 8 hours or more.   Drink 2 glasses of water before your lab appointment. It is fine to take your  oral medications on the morning of the lab test as usual  See me a few days later or earlier as needed  Bone density (DEXA)  in the next few months. Mammogram 11/18/23 or later    This will be done at the [facility] or may check to see if option at [place]. Call [phone] or use [employer] to schedule.    Shingrix vaccine coverage for shingles prevention.  This is a 2 shot series done 2-6 months apart. Get at pharmacy    Dariel Weber MD  [facility]

## 2023-03-18 RX ORDER — PRAVASTATIN SODIUM 80 MG/1
TABLET ORAL
Qty: 90 TABLET | Refills: 3 | Status: SHIPPED | OUTPATIENT
Start: 2023-03-18 | End: 2024-01-29

## 2023-03-23 ENCOUNTER — ANCILLARY PROCEDURE (OUTPATIENT)
Dept: BONE DENSITY | Facility: CLINIC | Age: 83
End: 2023-03-23
Attending: INTERNAL MEDICINE
Payer: COMMERCIAL

## 2023-03-23 DIAGNOSIS — Z78.0 ASYMPTOMATIC MENOPAUSAL STATE: ICD-10-CM

## 2023-03-23 PROCEDURE — 77085 DXA BONE DENSITY AXL VRT FX: CPT | Performed by: INTERNAL MEDICINE

## 2023-05-24 ENCOUNTER — OFFICE VISIT (OUTPATIENT)
Dept: URGENT CARE | Facility: URGENT CARE | Age: 83
End: 2023-05-24
Payer: COMMERCIAL

## 2023-05-24 ENCOUNTER — NURSE TRIAGE (OUTPATIENT)
Dept: INTERNAL MEDICINE | Facility: CLINIC | Age: 83
End: 2023-05-24

## 2023-05-24 ENCOUNTER — NURSE TRIAGE (OUTPATIENT)
Dept: NURSING | Facility: CLINIC | Age: 83
End: 2023-05-24

## 2023-05-24 VITALS
DIASTOLIC BLOOD PRESSURE: 77 MMHG | TEMPERATURE: 98.4 F | OXYGEN SATURATION: 98 % | HEART RATE: 60 BPM | SYSTOLIC BLOOD PRESSURE: 148 MMHG

## 2023-05-24 DIAGNOSIS — R25.1 TREMOR: Primary | ICD-10-CM

## 2023-05-24 DIAGNOSIS — E55.9 VITAMIN D DEFICIENCY DISEASE: ICD-10-CM

## 2023-05-24 LAB
ALBUMIN SERPL-MCNC: 3.8 G/DL (ref 3.4–5)
ALP SERPL-CCNC: 67 U/L (ref 40–150)
ALT SERPL W P-5'-P-CCNC: 13 U/L (ref 0–50)
ANION GAP SERPL CALCULATED.3IONS-SCNC: <1 MMOL/L (ref 3–14)
AST SERPL W P-5'-P-CCNC: 23 U/L (ref 0–45)
BASOPHILS # BLD AUTO: 0 10E3/UL (ref 0–0.2)
BASOPHILS NFR BLD AUTO: 0 %
BILIRUB SERPL-MCNC: 0.7 MG/DL (ref 0.2–1.3)
BUN SERPL-MCNC: 16 MG/DL (ref 7–30)
CALCIUM SERPL-MCNC: 9.7 MG/DL (ref 8.5–10.1)
CHLORIDE BLD-SCNC: 109 MMOL/L (ref 94–109)
CO2 SERPL-SCNC: 32 MMOL/L (ref 20–32)
CREAT SERPL-MCNC: 0.6 MG/DL (ref 0.52–1.04)
EOSINOPHIL # BLD AUTO: 0.1 10E3/UL (ref 0–0.7)
EOSINOPHIL NFR BLD AUTO: 1 %
ERYTHROCYTE [DISTWIDTH] IN BLOOD BY AUTOMATED COUNT: 12.3 % (ref 10–15)
GFR SERPL CREATININE-BSD FRML MDRD: 89 ML/MIN/1.73M2
GLUCOSE BLD-MCNC: 117 MG/DL (ref 70–99)
HCT VFR BLD AUTO: 40.2 % (ref 35–47)
HGB BLD-MCNC: 13.1 G/DL (ref 11.7–15.7)
IMM GRANULOCYTES # BLD: 0 10E3/UL
IMM GRANULOCYTES NFR BLD: 0 %
LYMPHOCYTES # BLD AUTO: 1.6 10E3/UL (ref 0.8–5.3)
LYMPHOCYTES NFR BLD AUTO: 26 %
MCH RBC QN AUTO: 31 PG (ref 26.5–33)
MCHC RBC AUTO-ENTMCNC: 32.6 G/DL (ref 31.5–36.5)
MCV RBC AUTO: 95 FL (ref 78–100)
MONOCYTES # BLD AUTO: 0.5 10E3/UL (ref 0–1.3)
MONOCYTES NFR BLD AUTO: 8 %
NEUTROPHILS # BLD AUTO: 4.2 10E3/UL (ref 1.6–8.3)
NEUTROPHILS NFR BLD AUTO: 65 %
PLATELET # BLD AUTO: 244 10E3/UL (ref 150–450)
POTASSIUM BLD-SCNC: 4.5 MMOL/L (ref 3.4–5.3)
PROT SERPL-MCNC: 7.5 G/DL (ref 6.8–8.8)
RBC # BLD AUTO: 4.23 10E6/UL (ref 3.8–5.2)
SODIUM SERPL-SCNC: 141 MMOL/L (ref 133–144)
WBC # BLD AUTO: 6.4 10E3/UL (ref 4–11)

## 2023-05-24 PROCEDURE — 80053 COMPREHEN METABOLIC PANEL: CPT | Performed by: FAMILY MEDICINE

## 2023-05-24 PROCEDURE — 99214 OFFICE O/P EST MOD 30 MIN: CPT | Performed by: FAMILY MEDICINE

## 2023-05-24 PROCEDURE — 86618 LYME DISEASE ANTIBODY: CPT | Performed by: FAMILY MEDICINE

## 2023-05-24 PROCEDURE — 82306 VITAMIN D 25 HYDROXY: CPT | Performed by: FAMILY MEDICINE

## 2023-05-24 PROCEDURE — 85025 COMPLETE CBC W/AUTO DIFF WBC: CPT | Performed by: FAMILY MEDICINE

## 2023-05-24 PROCEDURE — 83735 ASSAY OF MAGNESIUM: CPT | Performed by: FAMILY MEDICINE

## 2023-05-24 PROCEDURE — 84443 ASSAY THYROID STIM HORMONE: CPT | Performed by: FAMILY MEDICINE

## 2023-05-24 PROCEDURE — 36415 COLL VENOUS BLD VENIPUNCTURE: CPT | Performed by: FAMILY MEDICINE

## 2023-05-24 NOTE — TELEPHONE ENCOUNTER
" Per last note below, pt has been having the left arm shaking intermittently for months though increasing ion frequency and severity. Per note below:    \"Denies pain no numbness or tingling.  No injuries.   Denies dizziness or blurred or double vision. Denies any weakness.   No loss of speech or garbled speech.  Denies headache. No facial drooping or weakness in one side of body.    Since not acute issue and happening for months, doesn't sound like TIA/stroke and doesn't need to be seen in ER today. I am out of clinic today and no openings rest of week. Would recommend pt see Neurology in the next 1-2 weeks and referral placed and they will call pt to schedule appt. If sx acutely worsen between now and then or other new neuro changes suchas as arm weakness, then pt to be seen in ER  "

## 2023-05-24 NOTE — TELEPHONE ENCOUNTER
"Nurse Triage SBAR    Is this a 2nd Level Triage? YES, LICENSED PRACTITIONER REVIEW IS REQUIRED    Situation: Patient calling with an episode of shaking in her left arm this morning. Patient was unable to button her blouse and become concerned about a possible stroke. Drove herself to the ED, but there was a long wait, so she went back home. Protocol advises patient go to ED/UCC Now (or to office with PCP approval). Routing to provider to advise.   Consent: not needed    Background:   L arm was shaking - States, \"it's been coming on for awhile where my hand gets dropsy and doesn't always work right.\"  Could not button blouse  Went to ED but it was fulll    Assessment:   Left arm shaking - less now  No pain  No swelling   No loss of sensation  No headache or dizziness    Protocol Recommended Disposition:   Go to ED/UCC Now (or to office with PCP approval)    Recommendation: Advised patient to Go to ED now. If no ride available, call 911. Patient went to the ED earlier and it was too full to wait. Will route message to provider to advise. Patient will look into getting a ride to ED while she waits for clinic to call back. Will call 911 if symptoms worsen.     Routed to provider to review and advise.        Nidia Veliz RN [facility] 5/24/2023 12:16 PM    Reason for Disposition    Neurologic deficit that was brief (now gone), ANY of the following: * Weakness of the face, arm, or leg on one side of the body * Numbness of the face, arm, or leg on one side of the body * Loss of speech or garbled speech    Additional Information    Negative: Difficult to awaken or acting confused (e.g., disoriented, slurred speech)    Negative: New neurologic deficit that is present NOW, sudden onset of ANY of the following: * Weakness of the face, arm, or leg on one side of the body* Numbness of the face, arm, or leg on one side of the body* Loss of speech or garbled speech    Negative: Sounds like a life-threatening " emergency to the triager    Negative: Confusion, disorientation, or hallucinations is main symptom    Negative: Dizziness is main symptom    Negative: Followed a head injury within last 3 days    Negative: Headache (with neurologic deficit)    Negative: Unable to urinate (or only a few drops) and bladder feels very full    Negative: Loss of bladder or bowel control (urine or bowel incontinence; wetting self, leaking stool) of new-onset    Negative: Back pain with numbness (loss of sensation) in groin or rectal area    Protocols used: NEUROLOGIC DEFICIT-A-OH

## 2023-05-24 NOTE — TELEPHONE ENCOUNTER
Son Keagan calling regarding Liana and  Her left arm has been shaking this has been happening off and on for a few months.  It is becoming more frequent and worse today.      Denies pain no numbness or tingling.  No injuries.    Denies dizziness or blurred or double vision. Denies any weakness.    No loss of speech or garbled speech.  Denies headache. No facial drooping or weakness in one side of body.      SEE IN OFFICE TODAY:   * You need to be examined today. Let me give you an appointment.  * IF NO AVAILABLE APPOINTMENTS:  You need to be seen in the Urgent Care Center. Go to the one nearest. Go there today. A nearby Urgent Care Center is often a good source of care. Another choice is to go to the Emergency Department.    Are you able to work patient in today?  Or what would you recommend?    Son is also willing to take her to /ED.      Rosanna Cagle RN  [facility] ~ Registered Nurse  Clinic Triage ~ [place] [facility] [place]  May 24, 2023        Reason for Disposition    Patient wants to be seen    Additional Information    Negative: Neurologic deficit of gradual onset (e.g., days to weeks), ANY of the following: * Weakness of the face, arm, or leg on one side of the body* Numbness of the face, arm, or leg on one side of the body* Loss of speech or garbled speech    Negative: [place] palsy suspected (i.e., weakness only one side of the face, developing over hours to days, no other symptoms)    Negative: Tingling (e.g., pins and needles) of the face, arm or leg on one side of the body, that is present now (Exceptions: Chronic or recurrent symptom lasting > 4 weeks; or tingling from known cause, such as: bumped elbow, carpal tunnel syndrome, pinched nerve, frostbite.)    Negative: Neurologic deficit that was brief (now gone), ANY of the following: * Weakness of the face, arm, or leg on one side of the body * Numbness of the face, arm, or leg on one side of the body * Loss of speech or garbled speech    Negative:  Patient sounds very sick or weak to the triager    Negative: Headache (with neurologic deficit)    Negative: Unable to urinate (or only a few drops) and bladder feels very full    Negative: Loss of bladder or bowel control (urine or bowel incontinence; wetting self, leaking stool) of new-onset    Negative: Back pain with numbness (loss of sensation) in groin or rectal area    Negative: Confusion, disorientation, or hallucinations is main symptom    Negative: Dizziness is main symptom    Negative: Followed a head injury within last 3 days    Negative: Difficult to awaken or acting confused (e.g., disoriented, slurred speech)    Negative: New neurologic deficit that is present NOW, sudden onset of ANY of the following: * Weakness of the face, arm, or leg on one side of the body* Numbness of the face, arm, or leg on one side of the body* Loss of speech or garbled speech    Negative: Sounds like a life-threatening emergency to the triager    Protocols used: NEUROLOGIC DEFICIT-A-OH

## 2023-05-24 NOTE — PROGRESS NOTES
"SUBJECTIVE:  Chief Complaint   Patient presents with     Urgent Care     LEFT HAND HAS BEEN SHAKY X A FEW MONTHS-  TREMOR WORSENED TODAY, HAD TROUBLE BUTTONING HER SHIRT TODAY, WORRIED ABOUT A STROKE OR [place], HAD TROUBLE HOLDING ONTO THINGS WITH HER LEFT HAND     Liana Giraldo is a 83 year old female who presents with a chief complaint of left hand shaky and weakness.    Reviewed triage nurse from earlier today:  Situation: Patient calling with an episode of shaking in her left arm this morning. Patient was unable to button her blouse and become concerned about a possible stroke. Drove herself to the ED, but there was a long wait, so she went back home. Protocol advises patient go to ED/UCC Now (or to office with PCP approval). Routing to provider to advise.   Consent: not needed     Background:   L arm was shaking - States, \"it's been coming on for awhile where my hand gets dropsy and doesn't always work right.\"  Could not button blouse  Went to ED but it was fulll     Assessment:   Left arm shaking - less now  No pain  No swelling   No loss of sensation  No headache or dizziness     Protocol Recommended Disposition:   Go to ED/UCC Now (or to office with PCP approval)     Recommendation: Advised patient to Go to ED now. If no ride available, call 911. Patient went to the ED earlier and it was too full to wait. Will route message to provider to advise. Patient will look into getting a ride to ED while she waits for clinic to call back. Will call 911 if symptoms worsen.      Routed to provider to review and advise.    Note from primary provider:  Since not acute issue and happening for months, doesn't sound like TIA/stroke and doesn't need to be seen in ER today. I am out of clinic today and no openings rest of week. Would recommend pt see Neurology in the next 1-2 weeks and referral placed and they will call pt to schedule appt. If sx acutely worsen between now and then or other new neuro changes suchas as arm " weakness, then pt to be seen in ER    Thinks that left arm not feeling normal has been present for at least 1 month, more noticeable today, was shopping and trying to button her blouse and noticed that was unable to do this, worried due to shakiness and felt not as strong, feels that has been more pronounced over the past several days, just worse today.  Denies any injury.    Son is here with her.    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     Osteopenia      Other and unspecified hyperlipidemia      Personal history of colonic polyps 8/05    Hyperplastic     Rotator cuff (capsule) sprain 12/01     right with 3mm supraspinatous tendon tear     Synovial cyst of popliteal space 7/30/03    right     TIA (transient ischemic attack) 2/25/16     Vitamin D deficiency disease 2/23/2013     Current Outpatient Medications   Medication Sig Dispense Refill     aspirin (ASA) 81 MG tablet Take 1 tablet (81 mg) by mouth daily       Cyanocobalamin (B-12) 1000 MCG SUBL Place 1 tablet under the tongue daily       LORazepam (ATIVAN) 0.5 MG tablet Take 1 tablet (0.5 mg) by mouth daily as needed for anxiety 30 tablet 0     pravastatin (PRAVACHOL) 80 MG tablet Take 1 tablet by mouth. at bedtime. 90 tablet 3     vitamin D3 (CHOLECALCIFEROL) 2000 units (50 mcg) tablet 1 tab  daily 90 tablet 3     Social History     Tobacco Use     Smoking status: Never     Smokeless tobacco: Never   Vaping Use     Vaping status: Never Used   Substance Use Topics     Alcohol use: Yes     Comment: occ       ROS:  Review of systems negative except as stated above.    EXAM:   BP (!) 148/77   Pulse 60   Temp 98.4  F (36.9  C) (Tympanic)   LMP  (LMP Unknown)   SpO2 98%   M/S Exam:left arm - no overt tremor, ROM intact, no tenderness  GENERAL APPEARANCE: healthy, alert and no  distress  EXTREMITIES: peripheral pulses normal, strength symmetric  SKIN: no suspicious lesions or rashes  PSYCH:alert, affect bright    X-RAY was not done.    Results for orders placed or performed in visit on 05/24/23   Comprehensive metabolic panel     Status: Abnormal   Result Value Ref Range    Sodium 141 133 - 144 mmol/L    Potassium 4.5 3.4 - 5.3 mmol/L    Chloride 109 94 - 109 mmol/L    Carbon Dioxide (CO2) 32 20 - 32 mmol/L    Anion Gap <1 (L) 3 - 14 mmol/L    Urea Nitrogen 16 7 - 30 mg/dL    Creatinine 0.60 0.52 - 1.04 mg/dL    Calcium 9.7 8.5 - 10.1 mg/dL    Glucose 117 (H) 70 - 99 mg/dL    Alkaline Phosphatase 67 40 - 150 U/L    AST 23 0 - 45 U/L    ALT 13 0 - 50 U/L    Protein Total 7.5 6.8 - 8.8 g/dL    Albumin 3.8 3.4 - 5.0 g/dL    Bilirubin Total 0.7 0.2 - 1.3 mg/dL    GFR Estimate 89 >60 mL/min/1.73m2   CBC with platelets and differential     Status: None   Result Value Ref Range    WBC Count 6.4 4.0 - 11.0 10e3/uL    RBC Count 4.23 3.80 - 5.20 10e6/uL    Hemoglobin 13.1 11.7 - 15.7 g/dL    Hematocrit 40.2 35.0 - 47.0 %    MCV 95 78 - 100 fL    MCH 31.0 26.5 - 33.0 pg    MCHC 32.6 31.5 - 36.5 g/dL    RDW 12.3 10.0 - 15.0 %    Platelet Count 244 150 - 450 10e3/uL    % Neutrophils 65 %    % Lymphocytes 26 %    % Monocytes 8 %    % Eosinophils 1 %    % Basophils 0 %    % Immature Granulocytes 0 %    Absolute Neutrophils 4.2 1.6 - 8.3 10e3/uL    Absolute Lymphocytes 1.6 0.8 - 5.3 10e3/uL    Absolute Monocytes 0.5 0.0 - 1.3 10e3/uL    Absolute Eosinophils 0.1 0.0 - 0.7 10e3/uL    Absolute Basophils 0.0 0.0 - 0.2 10e3/uL    Absolute Immature Granulocytes 0.0 <=0.4 10e3/uL   CBC with platelets and differential     Status: None    Narrative    The following orders were created for panel order CBC with platelets and differential.  Procedure                               Abnormality         Status                     ---------                               -----------         ------                     CBC  with platelets and d...[[id number]]                      Final result                 Please view results for these tests on the individual orders.       ASSESSMENT/PLAN:  (R25.1) Tremor  (primary encounter diagnosis)  Comment: left arm  Plan: CBC with platelets and differential,         Comprehensive metabolic panel, TSH with free T4        reflex, Magnesium, Lyme Disease Total Abs Bld         with Reflex to Confirm CLIA            (E55.9) Vitamin D deficiency disease  Plan: Vitamin D Deficiency            Discussed with patient that neurological work up is warranted and that primary provider already appears to be involved, a Neurology referral already noted in her chart and reviewed with patient that can follow up with primary clinic in regards for help in referral process.  Patient does not have any acute deficit currently, labs obtained and reassuring, will follow up on pending labs and will notify if any abnormalities.  To ER if develops any acute neurologic changes.    Follow up with Neurology in 1-2 weeks  Follow up with primary provider in 1 week    Roland Allen MD  May 24, 2023 5:22 PM

## 2023-05-24 NOTE — PATIENT INSTRUCTIONS
Monitor symptoms    Go to ER if develop weakness - one sided, trouble talking, facial droop    Follow up with Neurology (per primary provider, referral already in place)

## 2023-05-25 LAB
B BURGDOR IGG+IGM SER QL: 0.12
DEPRECATED CALCIDIOL+CALCIFEROL SERPL-MC: 40 UG/L (ref 20–75)
MAGNESIUM SERPL-MCNC: 2.8 MG/DL (ref 1.7–2.3)
TSH SERPL DL<=0.005 MIU/L-ACNC: 1.97 UIU/ML (ref 0.3–4.2)

## 2023-05-25 NOTE — TELEPHONE ENCOUNTER
"Per chart review- pt went to ED yesterday but was too full so she went to UC. UC provider discussed the warranted neurological work up with pt.      Called and spoke with pt. Relayed Dr Weber's message from below.       Pt stated neuro can't get her in until Aug. Pt stated that's along time. Pt stated she \"wants to go to a hospital to get a full body check because there has to be nerve damage or something\".     Routing to PCP for any further recommendations.     Please call pt back with PCPs recommendations.    "

## 2023-05-26 NOTE — TELEPHONE ENCOUNTER
Patient called wanting to know if Dr. Weber is willing to order an MRI of patients spine to see what might be causing her termor while she waits to be seen by neurology    Rubin Bojorquez RN

## 2023-05-30 ENCOUNTER — MYC MEDICAL ADVICE (OUTPATIENT)
Dept: INTERNAL MEDICINE | Facility: CLINIC | Age: 83
End: 2023-05-30

## 2023-05-31 ENCOUNTER — APPOINTMENT (OUTPATIENT)
Dept: MRI IMAGING | Facility: CLINIC | Age: 83
DRG: 064 | End: 2023-05-31
Attending: EMERGENCY MEDICINE
Payer: COMMERCIAL

## 2023-05-31 ENCOUNTER — APPOINTMENT (OUTPATIENT)
Dept: CT IMAGING | Facility: CLINIC | Age: 83
DRG: 064 | End: 2023-05-31
Attending: EMERGENCY MEDICINE
Payer: COMMERCIAL

## 2023-05-31 ENCOUNTER — HOSPITAL ENCOUNTER (INPATIENT)
Facility: CLINIC | Age: 83
LOS: 1 days | Discharge: HOME | DRG: 064 | End: 2023-06-01
Attending: EMERGENCY MEDICINE | Admitting: INTERNAL MEDICINE
Payer: COMMERCIAL

## 2023-05-31 ENCOUNTER — APPOINTMENT (OUTPATIENT)
Dept: CARDIOLOGY | Facility: CLINIC | Age: 83
DRG: 064 | End: 2023-05-31
Attending: PHYSICIAN ASSISTANT
Payer: COMMERCIAL

## 2023-05-31 DIAGNOSIS — I63.9 ACUTE CVA (CEREBROVASCULAR ACCIDENT) (H): ICD-10-CM

## 2023-05-31 DIAGNOSIS — I60.9 SUBARACHNOID HEMORRHAGE (H): ICD-10-CM

## 2023-05-31 DIAGNOSIS — R56.9 PARTIAL SEIZURE (H): Primary | ICD-10-CM

## 2023-05-31 DIAGNOSIS — R56.9 PARTIAL SEIZURE (H): ICD-10-CM

## 2023-05-31 LAB
ALBUMIN SERPL BCG-MCNC: 4.1 G/DL (ref 3.5–5.2)
ALP SERPL-CCNC: 66 U/L (ref 35–104)
ALT SERPL W P-5'-P-CCNC: 8 U/L (ref 10–35)
ANION GAP SERPL CALCULATED.3IONS-SCNC: 12 MMOL/L (ref 7–15)
AST SERPL W P-5'-P-CCNC: 16 U/L (ref 10–35)
ATRIAL RATE - MUSE: 61 BPM
BASOPHILS # BLD AUTO: 0 10E3/UL (ref 0–0.2)
BASOPHILS NFR BLD AUTO: 1 %
BILIRUB SERPL-MCNC: 0.3 MG/DL
BUN SERPL-MCNC: 11.5 MG/DL (ref 8–23)
CALCIUM SERPL-MCNC: 9.1 MG/DL (ref 8.8–10.2)
CHLORIDE SERPL-SCNC: 105 MMOL/L (ref 98–107)
CREAT SERPL-MCNC: 0.77 MG/DL (ref 0.51–0.95)
CRP SERPL-MCNC: <3 MG/L
DEPRECATED HCO3 PLAS-SCNC: 25 MMOL/L (ref 22–29)
DIASTOLIC BLOOD PRESSURE - MUSE: NORMAL MMHG
EOSINOPHIL # BLD AUTO: 0.2 10E3/UL (ref 0–0.7)
EOSINOPHIL NFR BLD AUTO: 3 %
ERYTHROCYTE [DISTWIDTH] IN BLOOD BY AUTOMATED COUNT: 12.6 % (ref 10–15)
ERYTHROCYTE [SEDIMENTATION RATE] IN BLOOD BY WESTERGREN METHOD: 20 MM/HR (ref 0–30)
GFR SERPL CREATININE-BSD FRML MDRD: 77 ML/MIN/1.73M2
GLUCOSE BLDC GLUCOMTR-MCNC: 101 MG/DL (ref 70–99)
GLUCOSE BLDC GLUCOMTR-MCNC: 118 MG/DL (ref 70–99)
GLUCOSE BLDC GLUCOMTR-MCNC: 98 MG/DL (ref 70–99)
GLUCOSE SERPL-MCNC: 94 MG/DL (ref 70–99)
HBA1C MFR BLD: 5.4 %
HCT VFR BLD AUTO: 36.8 % (ref 35–47)
HGB BLD-MCNC: 12.3 G/DL (ref 11.7–15.7)
HOLD SPECIMEN: NORMAL
IMM GRANULOCYTES # BLD: 0 10E3/UL
IMM GRANULOCYTES NFR BLD: 0 %
INR PPP: 1 (ref 0.85–1.15)
INTERPRETATION ECG - MUSE: NORMAL
LYMPHOCYTES # BLD AUTO: 2.3 10E3/UL (ref 0.8–5.3)
LYMPHOCYTES NFR BLD AUTO: 36 %
MCH RBC QN AUTO: 31.1 PG (ref 26.5–33)
MCHC RBC AUTO-ENTMCNC: 33.4 G/DL (ref 31.5–36.5)
MCV RBC AUTO: 93 FL (ref 78–100)
MONOCYTES # BLD AUTO: 0.6 10E3/UL (ref 0–1.3)
MONOCYTES NFR BLD AUTO: 10 %
NEUTROPHILS # BLD AUTO: 3.3 10E3/UL (ref 1.6–8.3)
NEUTROPHILS NFR BLD AUTO: 50 %
NRBC # BLD AUTO: 0 10E3/UL
NRBC BLD AUTO-RTO: 0 /100
P AXIS - MUSE: 41 DEGREES
PLATELET # BLD AUTO: 240 10E3/UL (ref 150–450)
POTASSIUM SERPL-SCNC: 3.9 MMOL/L (ref 3.4–5.3)
PR INTERVAL - MUSE: 174 MS
PROT SERPL-MCNC: 7 G/DL (ref 6.4–8.3)
QRS DURATION - MUSE: 92 MS
QT - MUSE: 428 MS
QTC - MUSE: 430 MS
R AXIS - MUSE: -1 DEGREES
RADIOLOGIST FLAGS: ABNORMAL
RBC # BLD AUTO: 3.95 10E6/UL (ref 3.8–5.2)
SODIUM SERPL-SCNC: 142 MMOL/L (ref 136–145)
SYSTOLIC BLOOD PRESSURE - MUSE: NORMAL MMHG
T AXIS - MUSE: 41 DEGREES
TROPONIN T SERPL HS-MCNC: 9 NG/L
VENTRICULAR RATE- MUSE: 61 BPM
WBC # BLD AUTO: 6.4 10E3/UL (ref 4–11)

## 2023-05-31 PROCEDURE — 99291 CRITICAL CARE FIRST HOUR: CPT | Mod: 25

## 2023-05-31 PROCEDURE — 120N000001 HC R&B MED SURG/OB

## 2023-05-31 PROCEDURE — 86140 C-REACTIVE PROTEIN: CPT | Performed by: EMERGENCY MEDICINE

## 2023-05-31 PROCEDURE — 93306 TTE W/DOPPLER COMPLETE: CPT | Mod: 26 | Performed by: INTERNAL MEDICINE

## 2023-05-31 PROCEDURE — 93306 TTE W/DOPPLER COMPLETE: CPT

## 2023-05-31 PROCEDURE — 87040 BLOOD CULTURE FOR BACTERIA: CPT | Performed by: EMERGENCY MEDICINE

## 2023-05-31 PROCEDURE — 84484 ASSAY OF TROPONIN QUANT: CPT | Performed by: EMERGENCY MEDICINE

## 2023-05-31 PROCEDURE — 82962 GLUCOSE BLOOD TEST: CPT

## 2023-05-31 PROCEDURE — 70553 MRI BRAIN STEM W/O & W/DYE: CPT

## 2023-05-31 PROCEDURE — 83036 HEMOGLOBIN GLYCOSYLATED A1C: CPT | Performed by: PHYSICIAN ASSISTANT

## 2023-05-31 PROCEDURE — 70450 CT HEAD/BRAIN W/O DYE: CPT

## 2023-05-31 PROCEDURE — 70544 MR ANGIOGRAPHY HEAD W/O DYE: CPT

## 2023-05-31 PROCEDURE — 85652 RBC SED RATE AUTOMATED: CPT | Performed by: EMERGENCY MEDICINE

## 2023-05-31 PROCEDURE — 250N000013 HC RX MED GY IP 250 OP 250 PS 637: Performed by: PSYCHIATRY & NEUROLOGY

## 2023-05-31 PROCEDURE — 93005 ELECTROCARDIOGRAM TRACING: CPT

## 2023-05-31 PROCEDURE — 70549 MR ANGIOGRAPH NECK W/O&W/DYE: CPT

## 2023-05-31 PROCEDURE — 36415 COLL VENOUS BLD VENIPUNCTURE: CPT | Performed by: EMERGENCY MEDICINE

## 2023-05-31 PROCEDURE — 999N000226 HC STATISTIC SLP IP EVAL DEFER

## 2023-05-31 PROCEDURE — A9585 GADOBUTROL INJECTION: HCPCS | Performed by: EMERGENCY MEDICINE

## 2023-05-31 PROCEDURE — 250N000013 HC RX MED GY IP 250 OP 250 PS 637: Performed by: EMERGENCY MEDICINE

## 2023-05-31 PROCEDURE — 99223 1ST HOSP IP/OBS HIGH 75: CPT | Mod: AI | Performed by: PHYSICIAN ASSISTANT

## 2023-05-31 PROCEDURE — 85025 COMPLETE CBC W/AUTO DIFF WBC: CPT | Performed by: EMERGENCY MEDICINE

## 2023-05-31 PROCEDURE — G0463 HOSPITAL OUTPT CLINIC VISIT: HCPCS | Mod: 25 | Performed by: PHYSICIAN ASSISTANT

## 2023-05-31 PROCEDURE — 255N000002 HC RX 255 OP 636: Performed by: EMERGENCY MEDICINE

## 2023-05-31 PROCEDURE — 999N000111 HC STATISTIC OT IP EVAL DEFER

## 2023-05-31 PROCEDURE — 80053 COMPREHEN METABOLIC PANEL: CPT | Performed by: EMERGENCY MEDICINE

## 2023-05-31 PROCEDURE — G0426 INPT/ED TELECONSULT50: HCPCS | Mod: G0 | Performed by: NURSE PRACTITIONER

## 2023-05-31 PROCEDURE — 250N000013 HC RX MED GY IP 250 OP 250 PS 637: Performed by: PHYSICIAN ASSISTANT

## 2023-05-31 PROCEDURE — 85610 PROTHROMBIN TIME: CPT | Performed by: EMERGENCY MEDICINE

## 2023-05-31 RX ORDER — GADOBUTROL 604.72 MG/ML
50 INJECTION INTRAVENOUS ONCE
Status: COMPLETED | OUTPATIENT
Start: 2023-05-31 | End: 2023-05-31

## 2023-05-31 RX ORDER — LIDOCAINE 40 MG/G
CREAM TOPICAL
Status: DISCONTINUED | OUTPATIENT
Start: 2023-05-31 | End: 2023-06-01 | Stop reason: HOSPADM

## 2023-05-31 RX ORDER — LORAZEPAM 0.5 MG/1
0.5 TABLET ORAL DAILY PRN
Status: DISCONTINUED | OUTPATIENT
Start: 2023-05-31 | End: 2023-06-01 | Stop reason: HOSPADM

## 2023-05-31 RX ORDER — GADOBUTROL 604.72 MG/ML
10 INJECTION INTRAVENOUS ONCE
Status: COMPLETED | OUTPATIENT
Start: 2023-05-31 | End: 2023-05-31

## 2023-05-31 RX ORDER — LEVETIRACETAM 500 MG/1
500 TABLET ORAL 2 TIMES DAILY
Status: DISCONTINUED | OUTPATIENT
Start: 2023-05-31 | End: 2023-06-01 | Stop reason: HOSPADM

## 2023-05-31 RX ORDER — PRAVASTATIN SODIUM 20 MG
80 TABLET ORAL EVERY EVENING
Status: DISCONTINUED | OUTPATIENT
Start: 2023-05-31 | End: 2023-06-01 | Stop reason: HOSPADM

## 2023-05-31 RX ORDER — ACETAMINOPHEN 325 MG/1
650 TABLET ORAL EVERY 4 HOURS PRN
Status: DISCONTINUED | OUTPATIENT
Start: 2023-05-31 | End: 2023-06-01 | Stop reason: HOSPADM

## 2023-05-31 RX ORDER — ASPIRIN 325 MG
325 TABLET ORAL ONCE
Status: COMPLETED | OUTPATIENT
Start: 2023-05-31 | End: 2023-05-31

## 2023-05-31 RX ORDER — PRAVASTATIN SODIUM 20 MG
80 TABLET ORAL DAILY
Status: DISCONTINUED | OUTPATIENT
Start: 2023-05-31 | End: 2023-05-31

## 2023-05-31 RX ADMIN — LEVETIRACETAM 500 MG: 500 TABLET, FILM COATED ORAL at 21:03

## 2023-05-31 RX ADMIN — GADOBUTROL 10 ML: 604.72 INJECTION INTRAVENOUS at 04:09

## 2023-05-31 RX ADMIN — PRAVASTATIN SODIUM 80 MG: 20 TABLET ORAL at 21:03

## 2023-05-31 RX ADMIN — ASPIRIN 325 MG ORAL TABLET 325 MG: 325 PILL ORAL at 06:19

## 2023-05-31 ASSESSMENT — ACTIVITIES OF DAILY LIVING (ADL)
ADLS_ACUITY_SCORE: 35
ADLS_ACUITY_SCORE: 35
ADLS_ACUITY_SCORE: 20
ADLS_ACUITY_SCORE: 35
ADLS_ACUITY_SCORE: 20
ADLS_ACUITY_SCORE: 35
ADLS_ACUITY_SCORE: 35
ADLS_ACUITY_SCORE: 20
ADLS_ACUITY_SCORE: 35
ADLS_ACUITY_SCORE: 35

## 2023-05-31 NOTE — PROGRESS NOTES
Reviewed imaging.     From neurovascular perspective, L frontal ischemic stroke an incidental finding.     Unclear currently what to make of R frontal sulcal FLAIR leptomeningeal hyperintensity with associated contrast enhancement in - but given HPI of recurrent episodic sensory changes with LUE ataxia, suspect findings may be associated with focal seizures. This lesion explains both sensory/motor disturbances as it touches both pre and post central gyri.     NCCT/MRI (GRE) reassuring against SAH.     HPI not suggestive of CNS infection.    Plan:  - Admit for stroke/abnormal MRI workup  - Initiate  mg x1  - Usual stroke workup  - Consult Gen Neuro for seizure workup   - Hold on initiating AEDs, defer to Gen Neuro     Louie Ireland MD PGY5 Stroke Fellow

## 2023-05-31 NOTE — CONSULTS
NEUROSURGERY CONSULT    Neurosurgery was asked by Dr. Eldridge to consult for left arm numbness.      PLAN:    Ms. Giraldo's most recent imaging exhibits a subacute subarachnoid hemorrhage in the right precentral sulcus and a 7 mm acute/early subacute infarct in the anterior left frontal lobe without mass effect or evidence of associated hemorrhage. However, we do not recommend surgical intervention at this time. In fact, from a Neurosurgical perspective, we do not need any further imaging or follow-up.    Based on her physical exam and imaging review, we feel that it would be in her best interest to obtain formal evaluations from the Neurology and Stoke teams.     We did review the images together and we explained her condition and the recommended treatment. We also discussed signs of a worsening problem that she should seek being evaluated. We will sign off.     Please page our on-call service for any questions or concerns.     It has been a pleasure meeting Liana Giraldo. Thank you for having us be involved in her care.    ______________________________________________________________________    HPI:    Linaa Giraldo is a pleasant 83 year old female who presented to the [facility] Emergency Department for consultation on numbness and weakness of her left arm , mainly from her elbow to finger tips. This has been going on for approximately one weeks. These are intermittent pisodes that last ten minutes. The night prior to presentation she had an episode of these symptoms that lasted over 20 minutes. During my evaluation, she is essentially asymptomatic.     There are no bowel or bladder changes. No other concerns are voiced.    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     Osteopenia      Other and unspecified hyperlipidemia      Personal  "history of colonic polyps 8/05    Hyperplastic     Rotator cuff (capsule) sprain 12/01     right with 3mm supraspinatous tendon tear     Synovial cyst of popliteal space 7/30/03    right     TIA (transient ischemic attack) 2/25/16     Vitamin D deficiency disease 2/23/2013       Past Surgical History:   Procedure Laterality Date     ZZC NONSPECIFIC PROCEDURE      appy       Allergies   Allergen Reactions     Lipitor [Atorvastatin Calcium]      Myalgias     Oxycodone Nausea and Vomiting     Rosuvastatin Calcium      muscle achiness     Simvastatin      muscle achiness       Social History     Tobacco Use     Smoking status: Never     Smokeless tobacco: Never   Vaping Use     Vaping status: Never Used   Substance Use Topics     Alcohol use: Yes     Comment: occ       Family History   Problem Relation Age of Onset     Cardiovascular Mother         MI     Cardiovascular Father         Aneurysm     Breast Cancer Sister      C.A.D. Brother         Brother       Scheduled Medications      sodium chloride (PF)  60 mL Intravenous Once       Home Medications  aspirin (ASA) 81 MG tablet  Cyanocobalamin (B-12) 1000 MCG SUBL  LORazepam (ATIVAN) 0.5 MG tablet  pravastatin (PRAVACHOL) 80 MG tablet  vitamin D3 (CHOLECALCIFEROL) 2000 units (50 mcg) tablet    ROS: 10 point ROS neg other than the symptoms noted above in the HPI.    Vitals:    /72   Pulse 61   Temp 97.6  F (36.4  C) (Temporal)   Resp 18   Ht 5' 7\" (1.702 m)   Wt 160 lb 0.9 oz (72.6 kg)   LMP  (LMP Unknown)   SpO2 96%   BMI 25.07 kg/m    Body mass index is 25.07 kg/m .  No intake/output data recorded.    Exam:  Pt examined in ED01 with her family present. Pt appears comfortable and in no apparent distress, moving all extremities.     Head: Normocephalic, without obvious abnormality, atraumatic, no facial asymmetry.   Eyes: conjunctivae/corneas clear. PERRL, EOM's intact.   Throat: lips, mucosa, and tongue normal; teeth and gums normal.   Neck: supple, " symmetrical, trachea midline, no adenopathy and thyroid: not enlarged, symmetric, no tenderness/mass/nodules.   Lungs: clear to auscultation bilaterally.   Heart: regular rate and rhythm.   Abdomen: soft, non-tender; bowel sounds normal; no masses, no organomegaly.   Pulses: 2+ and symmetric.   Skin: Skin color, texture, turgor normal. No rashes or lesions.     Alert and oriented to date and time. Able to recall the current president of [place].   CN II: Able to read nametag, VF full with gross confrontation.   CN III,IV,VI: RAFI, Extraocular movements full, absent for nystagmus, absent for ptosis.   CN V: Full sensation in V1,V2,V3, jaw clench symmetrical.   CN VII: Face symmetrical and with equal strength, able to puff cheeks out.   CN VIII: Able to hear conversation.   CN IX: Able to push tongue against bilateral cheeks.   CN X: Palate elevates symmetrically, uvula midline.   CN XI: Elevate shoulders symmetrical, full strength when turning head from side to side.   CNXII: Tongue protrudes midline, absent for fasciculation.   Able to name 3/3 objects.   Finger to nose slow and accurate.   Rapid hand movements intact.   Absent for upper and lower extremity drift.     GCS:   Eye: eyes open spontaneously (4)   Motor: obeys commands (6)   Verbal: oriented (5)   Composite score: 15     Bilateral upper extremities 5/5. Bilateral bicep and triceps reflexes 2/4. Sensation intact throughout. Normal ROM.   Bilateral lower extremities 5/5. Normal sensation t/o bilaterally. Bilateral patellar 2/4 and achilles reflex 1/4.   Calves soft and non-tender.     ECG/Telemetry:  Systolic Blood Pressure        Diastolic Blood Pressure       Ventricular Rate 61     Atrial Rate 61     IL Interval 174     QRS Duration 92          QTc 430     P Axis 41     R AXIS -1     T Axis 41     Interpretation ECG         Sinus rhythm  Low voltage QRS  Borderline ECG  When compared with ECG of 25-FEB-2016 14:57,  No significant change was  found     Imaging:  Impression per radiology read - I have personally reviewed the images with the patient.    CT HEAD W/O CONTRAST - 5/31/2023 6:08 AM CDT  1.  Subacute subarachnoid hemorrhage in the right precentral sulcus.  2.  The small infarct in the left frontal lobe is better seen on the prior MRI.    MR BRAIN WITHOUT AND WITH CONTRAST, MRA NECK (CAROTIDS) WITHOUT AND WITH CONTRAST, MRA BRAIN ("[place] OF LONG) WITHOUT CONTRAST -  05/31/2023, 5:10 AM CDT    HEAD MRI:   1.  7 mm acute/early subacute infarct in the anterior left frontal lobe without mass effect or evidence of associated hemorrhage.  2.  Nonspecific abnormal sulcal FLAIR signal and leptomeningeal enhancement within the right precentral sulcus, the exact nature of which is difficult to determine. Absence of abnormal blooming on the GRE sequence would make focal acute subarachnoid   hemorrhage less likely although the possibility of hyperacute hemorrhage is considered. Infection is also considered less likely given the relative focal nature of the finding as well as absence of diffusion signal. Recommend a noncontrast head CT for   further assessment. The possibility of focal subarachnoid hemorrhage in the setting of reversible cerebral vasoconstriction syndrome is considered.  3.  Underlying mild to moderate volume loss and presumed chronic small vessel ischemic changes.     HEAD MRA:   1.  Normal MRA [place] of Long.     NECK MRA:  1.  Normal neck MRA.    Available labs at time of consult:       Recent Labs   Lab 05/31/23  0043 05/24/23  1607   WBC 6.4 6.4   HGB 12.3 13.1   HCT 36.8 40.2   MCV 93 95    244     Recent Labs   Lab 05/31/23  0043 05/24/23  1607   WBC 6.4 6.4   HGB 12.3 13.1   HCT 36.8 40.2   MCV 93 95    244     Recent Labs   Lab 05/31/23  0747   SED 20     Recent Labs   Lab 05/31/23  0043 05/24/23  1607   HGB 12.3 13.1     Recent Labs   Lab 05/31/23  0043   INR 1.00     Recent Labs   Lab 05/31/23  0043 05/24/23  1607     244     Recent Labs   Lab 05/31/23  0043 05/24/23  1607   WBC 6.4 6.4         Respectfully,    KAMILA Paz, PAALEJO  [facility] Neurosurgery  [facility]     Tel: [phone]      All imaging, physical findings, and the above plan have been reviewed with Dr. Aldrich.

## 2023-05-31 NOTE — PROGRESS NOTES
Contacted regarding 84 yo female presenting to ER with several days of intermittent left arm numbness.  Head CT reveals small SAH.    Head CT:  IMPRESSION:  1.  Subacute subarachnoid hemorrhage in the right precentral sulcus.  2.  The small infarct in the left frontal lobe is better seen on the prior MRI.    RECOMMENDATIONS:  Okay to admit at [facility].  No further cranial imaging needed from NS standpoint.  Will see in consultation today but otherwise no further follow up indicated.  Neurology/stroke neurology have been consulted.    Discussed with Dr. Aldrich.    Bridgett Haas, FLAQUITOS  [facility] Neurosurgery  [address] [address]    Tel [phone]  Pager [phone]

## 2023-05-31 NOTE — ED NOTES
"[facility]  ED Nurse Handoff Report    ED Chief complaint: Numbness  . ED Diagnosis:   Final diagnoses:   None       Allergies:   Allergies   Allergen Reactions     Lipitor [Atorvastatin Calcium]      Myalgias     Oxycodone Nausea and Vomiting     Rosuvastatin Calcium      muscle achiness     Simvastatin      muscle achiness       Code Status: Full Code    Activity level - Baseline/Home:  independent.  Activity Level - Current:   standby.   Lift room needed: No.   Bariatric: No   Needed: No   Isolation: No.   Infection: Not Applicable.     Respiratory status: Room air    Vital Signs (within 30 minutes):   Vitals:    05/31/23 0022   BP: (!) 171/86   Pulse: 66   Resp: 20   Temp: 97.6  F (36.4  C)   TempSrc: Temporal   SpO2: 98%   Weight: 72.6 kg (160 lb 0.9 oz)   Height: 1.702 m (5' 7\")       Cardiac Rhythm:  ,      Pain level:    Patient confused: No.   Patient Falls Risk: nonskid shoes/slippers when out of bed, arm band in place and activity supervised.   Elimination Status: Has voided     Patient Report - Initial Complaint: Numbness  .   Focused Assessment: Liana Giraldo is a 84 YO F w/vascular RFs: prior TIA involving L sensory disturbance on ASA, HLD on statin, ??untreated HTN who presents on 5/31 for days of transient episodic  LUE sensory disturbance.      She decided to come to ED tonight given this episode lasted the longest - 20 minutes. Prior episodes have lasted < 10 min. These events have occurred for days now.      In further chart review she called PCP given LUE ataxia on 5/24 - describes days of LUE 'doesn't work right'.with associated shaking. She went to ED that day but went home given long ED wait time.      In 2016 she went to ED for L face sensory disturbance - MRI/MRA negative for neurovascular lesion at this time, this was her TIA event.      In ED exam reported to be non-focal.     Abnormal Results:   Labs Ordered and Resulted from Time of ED Arrival to Time of " ED Departure   COMPREHENSIVE METABOLIC PANEL - Abnormal       Result Value    Sodium 142      Potassium 3.9      Chloride 105      Carbon Dioxide (CO2) 25      Anion Gap 12      Urea Nitrogen 11.5      Creatinine 0.77      Calcium 9.1      Glucose 94      Alkaline Phosphatase 66      AST 16      ALT 8 (*)     Protein Total 7.0      Albumin 4.1      Bilirubin Total 0.3      GFR Estimate 77     TROPONIN T, HIGH SENSITIVITY - Normal    Troponin T, High Sensitivity 9     INR - Normal    INR 1.00     GLUCOSE BY METER - Normal    GLUCOSE BY METER POCT 98     CBC WITH PLATELETS AND DIFFERENTIAL    WBC Count 6.4      RBC Count 3.95      Hemoglobin 12.3      Hematocrit 36.8      MCV 93      MCH 31.1      MCHC 33.4      RDW 12.6      Platelet Count 240      % Neutrophils 50      % Lymphocytes 36      % Monocytes 10      % Eosinophils 3      % Basophils 1      % Immature Granulocytes 0      NRBCs per 100 WBC 0      Absolute Neutrophils 3.3      Absolute Lymphocytes 2.3      Absolute Monocytes 0.6      Absolute Eosinophils 0.2      Absolute Basophils 0.0      Absolute Immature Granulocytes 0.0      Absolute NRBCs 0.0          Head CT w/o contrast   Final Result   IMPRESSION:   1.  Subacute subarachnoid hemorrhage in the right precentral sulcus.   2.  The small infarct in the left frontal lobe is better seen on the prior MRI.      MRA Neck (Carotids) wo & w Contrast   Final Result   Abnormal   IMPRESSION:   HEAD MRI:    1.  7 mm acute/early subacute infarct in the anterior left frontal lobe without mass effect or evidence of associated hemorrhage.   2.  Nonspecific abnormal sulcal FLAIR signal and leptomeningeal enhancement within the right precentral sulcus, the exact nature of which is difficult to determine. Absence of abnormal blooming on the GRE sequence would make focal acute subarachnoid    hemorrhage less likely although the possibility of hyperacute hemorrhage is considered. Infection is also considered less likely  given the relative focal nature of the finding as well as absence of diffusion signal. Recommend a noncontrast head CT for    further assessment. The possibility of focal subarachnoid hemorrhage in the setting of reversible cerebral vasoconstriction syndrome is considered.   3.  Underlying mild to moderate volume loss and presumed chronic small vessel ischemic changes.      HEAD MRA:    1.  Normal MRA [place] of [place].      NECK MRA:   1.  Normal neck MRA.         [Critical Result: 7 mm acute infarct in the left frontal lobe.]      Finding was identified on 05/31/2023 at 5:14 AM CDT.       Dr. Saul Leavitt was contacted by me on 05/31/2023 at 5:36 AM CDT and verbalized understanding of the critical result.             MRA Brain ([place] of [place]) wo Contrast   Final Result   Abnormal   IMPRESSION:   HEAD MRI:    1.  7 mm acute/early subacute infarct in the anterior left frontal lobe without mass effect or evidence of associated hemorrhage.   2.  Nonspecific abnormal sulcal FLAIR signal and leptomeningeal enhancement within the right precentral sulcus, the exact nature of which is difficult to determine. Absence of abnormal blooming on the GRE sequence would make focal acute subarachnoid    hemorrhage less likely although the possibility of hyperacute hemorrhage is considered. Infection is also considered less likely given the relative focal nature of the finding as well as absence of diffusion signal. Recommend a noncontrast head CT for    further assessment. The possibility of focal subarachnoid hemorrhage in the setting of reversible cerebral vasoconstriction syndrome is considered.   3.  Underlying mild to moderate volume loss and presumed chronic small vessel ischemic changes.      HEAD MRA:    1.  Normal MRA [place] of [place].      NECK MRA:   1.  Normal neck MRA.         [Critical Result: 7 mm acute infarct in the left frontal lobe.]      Finding was identified on 05/31/2023 at 5:14 AM CDT.       Dr. Hughes  Tree was contacted by me on 05/31/2023 at 5:36 AM CDT and verbalized understanding of the critical result.             MR Brain w/o & w Contrast   Final Result   Abnormal   IMPRESSION:   HEAD MRI:    1.  7 mm acute/early subacute infarct in the anterior left frontal lobe without mass effect or evidence of associated hemorrhage.   2.  Nonspecific abnormal sulcal FLAIR signal and leptomeningeal enhancement within the right precentral sulcus, the exact nature of which is difficult to determine. Absence of abnormal blooming on the GRE sequence would make focal acute subarachnoid    hemorrhage less likely although the possibility of hyperacute hemorrhage is considered. Infection is also considered less likely given the relative focal nature of the finding as well as absence of diffusion signal. Recommend a noncontrast head CT for    further assessment. The possibility of focal subarachnoid hemorrhage in the setting of reversible cerebral vasoconstriction syndrome is considered.   3.  Underlying mild to moderate volume loss and presumed chronic small vessel ischemic changes.      HEAD MRA:    1.  Normal MRA [place] of [place].      NECK MRA:   1.  Normal neck MRA.         [Critical Result: 7 mm acute infarct in the left frontal lobe.]      Finding was identified on 05/31/2023 at 5:14 AM CDT.       Dr. Saul Leavitt was contacted by me on 05/31/2023 at 5:36 AM CDT and verbalized understanding of the critical result.                 Treatments provided: See MAR  Family Comments: none  OBS brochure/video discussed/provided to patient:  No  ED Medications:   Medications   sodium chloride (PF) 0.9% PF flush 60 mL (has no administration in time range)   gadobutrol (GADAVIST) injection 50 mL (50 mLs Intravenous Not Given 5/31/23 7201)   gadobutrol (GADAVIST) injection 10 mL (10 mLs Intravenous $Given 5/31/23 2888)   aspirin (ASA) tablet 325 mg (325 mg Oral $Given 5/31/23 1170)       Drips infusing:  No  For the majority of  the shift this patient was [place].   Interventions performed were none.    Sepsis treatment initiated: No    Cares/treatment/interventions/medications to be completed following ED care: none    ED Nurse Name: Hansa Anderson RN  2:26 AM  RECEIVING UNIT ED HANDOFF REVIEW    Above ED Nurse Handoff Report was reviewed: Yes  Reviewed by: Vidhi Connell RN on May 31, 2023 at 10:44 AM

## 2023-05-31 NOTE — PROGRESS NOTES
Per Rads there is subacute SAH in R precentral gyrus.     On re-review, did note subtle hyperdensity in region of interest in coronal cuts.     Given pt has been symptomatic for 7 days, not too concerned about hematoma expansion at this point.    Although vitals/HPI not suggestive of it, given SAH with distant ischemic infarct, infective/non-infective endocarditis on DDx.     Plan:  - Hold off on further APT for now, will likely need f/u NCCT stability scan  - Rest of plans per prior note  - Rec bld cx x2, ESR/CRP  - Defer HUNG consideration to Telesroke team    Louie Ireland MD PGY5 Stroke Fellow

## 2023-05-31 NOTE — H&P
"[facility]    History and Physical - Hospitalist Service       Date of Admission:  5/31/2023    Assessment & Plan Liana Giraldo is a 83 year old female with PMhx of HLD, TIA (2016), anxiety, who was admitted on 5/31/2023 after presenting with transient sensory disturbance of her left hand. NIH 0 on presentation.   Found to have subacute SAH in the right precentral sulcus, small infarct in the left frontal lobe.  Admitted for further work-up and consults with General neurology, NSG and Vascular Neurology.     On presentation hypertensive.  Labs unremarkable, INR normal. MRI brain with  7 mm acute/early subacute infarct in the anterior left frontal lobe without mass effect or evidence of associated hemorrhage. Nonspecific abnormal sulcal FLAIR signal and leptomeningeal enhancement within the right precentral sulcus, undetermined. Per Radiology - Absence of abnormal blooming on the GRE sequence would make focal acute subarachnoid  hemorrhage less likely although the possibility of hyperacute hemorrhage is considered. Infection is also considered less likely given the relative focal nature of the finding as well as absence of diffusion signal. Recommend a noncontrast head CT for   further assessment. The possibility of focal subarachnoid hemorrhage in the setting of reversible cerebral vasoconstriction syndrome is considered. Underlying mild to moderate volume loss and presumed chronic small vessel ischemic changes.   MRI was followed by noncontrast head CT which showed a subacute SAH in the right precentral sulcus.     In the ED NSG, Vascular Neurology consulted.    Admission was requested from hospitalist service for \"possible stroke\". Currently asymptomatic     Transient Sensory Disturbance of Left Hand  Started ~1week PTA, intermittent episodes lasting 10 min long described as numbness from finger tip to elbow. Not painful, no associated stroke symptoms no LOC. Episode prior to presentation " lasted longer (20 min)  Neurologic exam non focal on admission and asymptomatic currently.  From the ED there was concern this may be related to partial seizure given associated history of involuntary movement when symptomatic although no epilepsy noted on admission or history of this.  Given SAH with ischemic infarct endocarditis remained in differential diagnosis not related to SAH, CVA may be radicular or coming from cervical spine although no pain in neck or motor weakness in extremities.  - admitting IP with Ischemic stroke order set although not symptomatic currently and unlikely to require skilled therapy or rehab  - - telemetry   - serial neuro exams, discontinue if remains asymptomatic   - did continue OT consult for completion given hand symptoms, may discontinue if do not recur.  At baseline is independent in [facility].   - In in regards to infectious work-up CRP is negative.  No fever or white count.  Overall lower suspicion and no constitutional symptoms.  Blood cultures are in process as is ESR.  -Hold off on further antiplatelet therapy until consult.  Was already given aspirin in the emergency department defer HUNG consideration to vascular neurology consult  - TTE, lipid panel, a1c  - EEG, General Neurology consult  - seizure monitoring for now  - Vascular Neurology and NSG consults          SAH, subacute  Again, neurologic exam normal. Had received aspirin in ED prior to this being identified on [facility]. Vascular neurology does not have current concern for hematoma expansion   -NSG was consulted from the emergency department who recommended no further imaging of this.  Formal consult to follow.  Sea for admission at [facility].  -Work-up as above  -No chemoprophylaxis for DVT until further input from specialists    CVA, Left Frontal Ischemic Stroke  Likely subacute, asymptomatic given her current symptoms.  Was given aspirin in the emergency department neurosurgery was consulted noncontrast head CT MRI  "reassuring against SAH on over reads by specialties.   - as above   -Consider changing  PTA pravastatin to Crestor or Lipitor    Anxiety  - takes PTA ativan PRN, ok to resume     Mild Ascending Aorta Dilation  - noted on echo in 2016     Diet:  Regular    DVT Prophylaxis: Pneumatic Compression Devices  Horvath Catheter: Not present    Cardiac Monitoring: yes  Code Status:   Full Code as discussed on admission     Clinically Significant Risk Factors Present on Admission                # Drug Induced Platelet Defect: home medication list includes an antiplatelet medication        # Overweight: Estimated body mass index is 25.07 kg/m  as calculated from the following:    Height as of this encounter: 1.702 m (5' 7\").    Weight as of this encounter: 72.6 kg (160 lb 0.9 oz).            Disposition Plan      Expected Discharge Date: 06/02/2023         admit ip anticipate 24 to 48 hours stay for completion of work-up, monitoring and work-up for endocarditis, multispecialty involvement etc.      The patient's care was discussed with the Attending Physician, Dr. Fuentes, Care Coordinator/, Patient's Family and ED Team.      Trinity Eldridge PA-C    ______________________________________________________________________    Chief Complaint   \"hand numbness\"    History is obtained from the patient    History of Present Illness   Liana Giraldo is a 83 year old female who presented to the ED for numbness of left hand.    Ms. Giraldo first noticed development of a numbness in her fingertips up to her elbow about 1 week ago.  Not painful.  Episodes lasting about 10 minutes.  She would try to move her hands to see if the numbness would resolve with time.  She was evaluated last week by her primary at [facility] when this first happened and they recommended follow-up with a neurologist and was referred to the ED although she was not evaluated.  Has been having ongoing episodes since then not associated with other symptoms or " "LOC. Last night due to persistent symptoms episode lasting longer about 20 minutes presented to the emergency department for evaluation.  Has had no loss of consciousness.  No pain in her left upper extremity or neck.  Episodes are not painful. She has also desscribed as her hand \"doesn't work right\".  She reports mild discomfort with turning her neck laterally that has been going on for several months as well as shoulder discomfort.      Denies any history of prior neurologic problems or stroke.  She has never been on medications to treat seizure.  She has had no headache.  No weakness in her legs.  She is at baseline ambulatory and does not use assistive devices.  She denies any history of neuropathy.  No recent medication changes.  She has had no fever chest pain or shortness of breath.  She reports fatigue with this has been going on for months.     On further chart review has history of possible TIA on aspirin and hyperlipidemia.  She had sensory disturbance in 2016 where imaging was negative for CVA this was her reported TIA. She reports inconsistent use of aspirin.        On presentation hypertensive.  Labs unremarkable, INR normal. MRI brain with  7 mm acute/early subacute infarct in the anterior left frontal lobe without mass effect or evidence of associated hemorrhage. Nonspecific abnormal sulcal FLAIR signal and leptomeningeal enhancement within the right precentral sulcus, undetermined. Per Radiology - Absence of abnormal blooming on the GRE sequence would make focal acute subarachnoid  hemorrhage less likely although the possibility of hyperacute hemorrhage is considered. Infection is also considered less likely given the relative focal nature of the finding as well as absence of diffusion signal. Recommend a noncontrast head CT for   further assessment. The possibility of focal subarachnoid hemorrhage in the setting of reversible cerebral vasoconstriction syndrome is considered. Underlying mild to " "moderate volume loss and presumed chronic small vessel ischemic changes.   MRI was followed by noncontrast head CT which showed a subacute SAH in the right precentral sulcus.       In the ED NSG, Vascular Neurology consulted.    Admission was requested from hospitalist service for \"possible stroke\". Currently asymptomatic     Past Medical History    Past Medical History:   Diagnosis Date     Anxiety      Diffuse cystic mastopathy      Disturbances of sulphur-bearing amino-acid metabolism      ECZEMATOUS DERMATITIS      MILD MITRAL VALVE INSUFFUCIENCY      Osteopenia      Other and unspecified hyperlipidemia      Personal history of colonic polyps     Hyperplastic     Rotator cuff (capsule) sprain      right with 3mm supraspinatous tendon tear     Synovial cyst of popliteal space 03    right     TIA (transient ischemic attack) 16     Vitamin D deficiency disease 2013       Past Surgical History   Past Surgical History:   Procedure Laterality Date     [facility] NONSPECIFIC PROCEDURE      appy       Prior to Admission Medications   Prior to Admission Medications   Prescriptions Last Dose Informant Patient Reported? Taking?   Cyanocobalamin (B-12) 1000 MCG SUBL   Yes No   Sig: Place 1 tablet under the tongue daily   LORazepam (ATIVAN) 0.5 MG tablet   No No   Sig: Take 1 tablet (0.5 mg) by mouth daily as needed for anxiety   aspirin (ASA) 81 MG tablet   No No   Sig: Take 1 tablet (81 mg) by mouth daily   pravastatin (PRAVACHOL) 80 MG tablet   No No   Sig: Take 1 tablet by mouth. at bedtime.   vitamin D3 (CHOLECALCIFEROL) 2000 units (50 mcg) tablet   No No   Si tab  daily      Facility-Administered Medications: None        Review of Systems    The 10 point Review of Systems is negative other than noted in the HPI or here.     Social History   I have reviewed this patient's social history and updated it with pertinent information if needed.  Social History     Tobacco Use     Smoking status: Never "     Smokeless tobacco: Never   Vaping Use     Vaping status: Never Used   Substance Use Topics     Alcohol use: Yes     Comment: occ     Drug use: No       Family History   I have reviewed this patient's family history and updated it with pertinent information if needed.  Family History   Problem Relation Age of Onset     Cardiovascular Mother         MI     Cardiovascular Father         Aneurysm     Breast Cancer Sister      C.A.D. Brother         Brother        Physical Exam   Vital Signs: Temp: 97.6  F (36.4  C) Temp src: Temporal BP: (!) 153/82 Pulse: 66   Resp: 18 SpO2: 97 % O2 Device: None (Room air)    Weight: 160 lbs .86 oz    Constitutional: Awake, alert,  no apparent distress.  Eyes: Conjunctiva and pupils examined and normal.  HEENT: Moist mucous membranes, normal dentition.  Respiratory: Clear to auscultation bilaterally, no crackles or wheezing.  Cardiovascular: Regular rate and rhythm, normal S1 and S2, and no murmur noted.  GI: Soft, non-distended, non-tender, bowel sounds present. No rebound tenderness or guarding.  Lymph/Hematologic: No anterior cervical or supraclavicular adenopathy.  Skin: No rashes, no cyanosis, no edema.  Musculoskeletal: No deformities noted.  No erythema or tenderness. Moving all extremities.  Neurologic: No focal deficits noted. Speech is clear. Coordination and strength grossly normal.   Psychiatric: Appropriate affect.       Medical Decision Making       75 MINUTES SPENT BY ME on the date of service doing chart review, history, exam, documentation & further activities per the note.      Data   ------------------------- PAST 24 HR DATA REVIEWED -----------------------------------------------

## 2023-05-31 NOTE — PLAN OF CARE
PRIMARY DIAGNOSIS: SAH/partial seizure- numbness in LUE  OUTPATIENT/OBSERVATION GOALS TO BE MET BEFORE DISCHARGE:  1. Orthostatic performed: N/A    2. Diagnostic testing complete & at baseline neurologic testing: No    3. Cleared by consultants (if involved): No    4. Interpretation of cardiac rhythm per telemetry tech: Needs to be placed    5. Tolerating adequate PO diet and medications: Yes    6. Return to near baseline physical activity or neurologic status: Yes    Discharge Planner Nurse   Safe discharge environment identified: Yes  Barriers to discharge: Yes       Entered by: Vidhi Connell RN 05/31/2023 2:25 PM     Please review provider order for any additional goals.   Nurse to notify provider when observation goals have been met and patient is ready for discharge.    AOx4. Up SBA. Daughter at bedside. Tele stroke consult completed- awaiting recs. Echo completed- awaiting results. Pt down for EEG currently. Neuros WNL. Tolerated PO. Continue current POC.

## 2023-05-31 NOTE — PROGRESS NOTES
Speech Language Pathology: Orders received. Chart reviewed and discussed with care team.? Speech Language Pathology not indicated due to intact speech and swallowing skills (no facial droop or slurred speech). The patient passed RN swallow screen and is tolerating a regular diet/thin liquids without concern.? Defer discharge recommendations to MD and OT.? Will complete SLP orders.

## 2023-05-31 NOTE — PROGRESS NOTES
Occupational Therapy: Orders received. Chart reviewed and discussed with care team.? Occupational Therapy not indicated due to pt with no symptoms remaining in UE, indp in room with mobility and ADL.? Defer discharge recommendations to care team.? Will complete orders. Per BARBARA the pt is staying here overnight, will discuss again at rounds in AM. Please re-consult if new ADL needs or neuro symptoms arise

## 2023-05-31 NOTE — CONSULTS
[facility]    Stroke Consult Note    Reason for Consult:  stroke    Chief Complaint: Numbness       HPI  Liana Giraldo is a 83 year old female with past medical history significant for TIA (L face numbness, 2016), HLD, HTN. She presented to the ED 5/31 for evaluation of episodic left arm sensory disturbance for the past week. The first event occurred 5/24, when she realized she couldn't unbutton her shirt with her left hand while out shopping. She presented to the ED but left due to the long wait time. She had 1-2 episodes per day of numbness that began in the left arm and traveled up to the elbow over a few minutes. Some of the episodes included left facial numbness. They tyoically lasted <15 minutes, however, she had an episode lasting 30 minutes on 5/30, which prompted presentation.     Imaging was initially concerning for right cortical subarachnoid hemorrhage. The case was reviewed with on-call neuroradiology, who feel this is less likely blood. She denies recent trauma. She denies headaches.No significant change in memory or cognition recently. Prior to onset of left arm paraesthesias one week ago, she felt fatigued but otherwise well. No report of recent right sided deficits.     Stroke Evaluation Summarized    MRI/Head CT MRI  1.  7 mm acute/early subacute infarct in the anterior left frontal lobe without mass effect or evidence of associated hemorrhage.  2.  Nonspecific abnormal sulcal FLAIR signal and leptomeningeal enhancement within the right precentral sulcus, the exact nature of which is difficult to determine. Absence of abnormal blooming on the GRE sequence would make focal acute subarachnoid   hemorrhage less likely although the possibility of hyperacute hemorrhage is considered. Infection is also considered less likely given the relative focal nature of the finding as well as absence of diffusion signal. Recommend a noncontrast head CT for   further assessment. The  "possibility of focal subarachnoid hemorrhage in the setting of reversible cerebral vasoconstriction syndrome is considered.    CTH: Subacute subarachnoid hemorrhage in the right precentral sulcus   Intracranial Vasculature MRA normal   Cervical Vasculature MRA normal     Echocardiogram PENDING   EKG/Telemetry sinus   Other Testing Not Applicable      LDL  No lab value available in past 30 days   A1C  5/31/2023: 5.4 %   Troponin 5/31/2023: 9 ng/L       Impression & Recommendations  1. Incidental left frontal stroke due to embolic stroke of undetermined source    - Okay to start ASA 81 mg daily  - LDL pending, continue PTA pravastatin 80 mg daily (reports intolerance to other statins). Goal LDL <100  - A1c within goal <7.0  - Goal BP <140/90 with tighter control associated with decreased overall CV risk, if tolerated  - Therapies  - PLC stroke education  - Discharge with 30 day cardiac event monitor to evaluate for atrial fibrillation  - Blood cultures pending    2. Leptomeningeal enhancement within the right precentral sulcus. Finding initially felt to represent blood, however, upon review with neuroradiology, this is felt to be unlikely given no corresponding signal on GRE. Low suspicion for infection clinically. Non-specific hyperintensity, would benefit from repeat imaging.   3. Recurrent left arm paraesthesias, suspect focal seizure in the setting of #2    - General neurology consult pending  - Consider repeat MRI in 1-2 weeks to reevaluate right sided enhancement     Patient Follow-up    - in the next 1-2 week(s) with PCP  - with general neurology    Thank you for this consult. No further stroke evaluation is recommended, so we will sign off. Please contact us with any additional questions.    Pam Elliott, CNP  Vascular Neurology    To page me or covering stroke neurology team member, click here: [facility]  Choose \"On Call\" tab at top, then select \"NEUROLOGY/ALL SITES\" from middle drop-down box, press Enter, " "then look for \"stroke\" or \"telestroke\" for your site.    _____________________________________________________    Clinically Significant Risk Factors Present on Admission                # Drug Induced Platelet Defect: home medication list includes an antiplatelet medication                Past Medical History   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     Osteopenia      Other and unspecified hyperlipidemia      Personal history of colonic polyps 8/05    Hyperplastic     Rotator cuff (capsule) sprain 12/01     right with 3mm supraspinatous tendon tear     Synovial cyst of popliteal space 7/30/03    right     TIA (transient ischemic attack) 2/25/16     Vitamin D deficiency disease 2/23/2013     Past Surgical History   Past Surgical History:   Procedure Laterality Date     ZZC NONSPECIFIC PROCEDURE      appy     Medications   Home Meds  Prior to Admission medications    Medication Sig Start Date End Date Taking? Authorizing Provider   aspirin (ASA) 81 MG tablet Take 1 tablet (81 mg) by mouth daily 12/2/19  Yes Dariel Weber MD   Cyanocobalamin (B-12) 1000 MCG SUBL Place 1 tablet under the tongue daily 1/8/23  Yes Dariel Weber MD   LORazepam (ATIVAN) 0.5 MG tablet Take 1 tablet (0.5 mg) by mouth daily as needed for anxiety 1/5/23  Yes Dariel Weber MD   pravastatin (PRAVACHOL) 80 MG tablet Take 1 tablet by mouth. at bedtime. 3/18/23  Yes Dariel Weber MD   vitamin D3 (CHOLECALCIFEROL) 2000 units (50 mcg) tablet 1 tab  daily 12/2/19  Yes Dariel Weber MD       Scheduled Meds    [Held by provider] pravastatin  80 mg Oral Daily     sodium chloride (PF)  3 mL Intracatheter Q8H     sodium chloride (PF)  60 mL Intravenous Once       Infusion Meds      PRN Meds  acetaminophen, lidocaine 4%, lidocaine (buffered or not buffered), LORazepam, sodium chloride " (PF)    Allergies   Allergies   Allergen Reactions     Lipitor [Atorvastatin Calcium]      Myalgias     Oxycodone Nausea and Vomiting     Rosuvastatin Calcium      muscle achiness     Simvastatin      muscle achiness     Family History   Family History   Problem Relation Age of Onset     Cardiovascular Mother         MI     Cardiovascular Father         Aneurysm     Breast Cancer Sister      C.A.D. Brother         Brother     Social History   Social History     Tobacco Use     Smoking status: Never     Smokeless tobacco: Never   Vaping Use     Vaping status: Never Used   Substance Use Topics     Alcohol use: Yes     Comment: occ     Drug use: No       Review of Systems   The 10 point Review of Systems is negative other than noted in the HPI or here.        PHYSICAL EXAMINATION   Temp:  [97.6  F (36.4  C)-98.2  F (36.8  C)] 98.1  F (36.7  C)  Pulse:  [59-66] 62  Resp:  [16-20] 18  BP: (130-171)/(69-86) 133/76  SpO2:  [96 %-98 %] 97 %    Neuro Exam  Mental Status:  alert, oriented x 3, follows commands, speech clear and fluent, naming and repetition normal  Cranial Nerves:  visual fields intact (tested by nurse), EOMI with normal smooth pursuit, facial sensation intact and symmetric (tested by nurse), facial movements symmetric, hearing not formally tested but intact to conversation, no dysarthria, shoulder shrug equal bilaterally, tongue protrusion midline  Motor:  no abnormal movements, able to move all limbs antigravity spontaneously with no signs of hemiparesis observed, no pronator drift  Reflexes:  unable to test (telestroke)  Sensory:  light touch sensation intact and symmetric throughout upper and lower extremities (assessed by nurse), no extinction on double simultaneous stimulation (assessed by nurse)  Coordination:  normal finger-to-nose and heel-to-shin bilaterally without dysmetria  Station/Gait:  unable to test due to telestroke    Dysphagia Screen  Per Nursing    Stroke Scales    NIHSS  1a. Level of  Consciousness 0-->Alert, keenly responsive   1b. LOC Questions 0-->Answers both questions correctly   1c. LOC Commands 0-->Performs both tasks correctly   2.   Best Gaze 0-->Normal   3.   Visual 0-->No visual loss   4.   Facial Palsy 0-->Normal symmetrical movements   5a. Motor Arm, Left 0-->No drift, limb holds 90 (or 45) degrees for full 10 secs   5b. Motor Arm, Right 0-->No drift, limb holds 90 (or 45) degrees for full 10 secs   6a. Motor Leg, Left 0-->No drift, leg holds 30 degree position for full 5 secs   6b. Motor Leg, right 0-->No drift, leg holds 30 degree position for full 5 secs   7.   Limb Ataxia 0-->Absent   8.   Sensory 0-->Normal, no sensory loss   9.   Best Language 0-->No aphasia, normal   10. Dysarthria 0-->Normal   11. Extinction and Inattention  0-->No abnormality   Total 0 (05/31/23 1300)       Modified Hendricks Score (Pre-morbid)  0-No deficits     Imaging  I personally reviewed all imaging; relevant findings per HPI.    Labs Data   CBC  Recent Labs   Lab 05/31/23  0043 05/24/23  1607   WBC 6.4 6.4   RBC 3.95 4.23   HGB 12.3 13.1   HCT 36.8 40.2    244     Basic Metabolic Panel   Recent Labs   Lab 05/31/23  0043 05/31/23  0033 05/24/23  1607     --  141   POTASSIUM 3.9  --  4.5   CHLORIDE 105  --  109   CO2 25  --  32   BUN 11.5  --  16   CR 0.77  --  0.60   GLC 94 98 117*   KYRA 9.1  --  9.7     Liver Panel  Recent Labs   Lab 05/31/23  0043 05/24/23  1607   PROTTOTAL 7.0 7.5   ALBUMIN 4.1 3.8   BILITOTAL 0.3 0.7   ALKPHOS 66 67   AST 16 23   ALT 8* 13     INR    Recent Labs   Lab Test 05/31/23  0043   INR 1.00      Lipid Profile    Recent Labs   Lab Test 11/17/22  0951 11/15/21  1218 10/20/20  1039   CHOL 190 189 191   HDL 65 61 65   * 111* 107*   TRIG 106 83 97     A1C    Recent Labs   Lab Test 05/31/23  0747   A1C 5.4     Troponin    Recent Labs   Lab 05/31/23  0043   CTROPT 9          Stroke Consult Data Data   Telestroke Service Details  (for non-emergent stroke consult  with tele)  Video start time 05/31/23   1243   Video end time 05/31/23   1326   Type of service telemedicine diagnostic assessment of acute neurological changes   Reason telemedicine is appropriate patient requires assessment with a specialist for diagnosis and treatment of neurological symptoms   Mode of transmission secure interactive audio and video communication per Emmanuel   Originating site (patient location) [facility]    Distant site (provider location) [facility]     I have personally spent a total of 70 minutes providing care today, time spent in reviewing medical records and reviewing tests, examining the patient and obtaining history, coordination of care, and discussion with the patient and/or family regarding diagnostic results, prognosis, symptom management, risks and benefits of management options, and development of plan of care. Greater than 50% was spent in counseling and coordination of care.

## 2023-05-31 NOTE — ED TRIAGE NOTES
Left sided numbness/weakness in arm started at approximately 2130.  Slight left sided facial droop.  Stroke eval called.  Denies vision changes, speech appears normal and pt is A&Ox4.      Triage Assessment     Row Name 05/31/23 0023       Triage Assessment (Adult)    Airway WDL WDL       Respiratory WDL    Respiratory WDL WDL       Skin Circulation/Temperature WDL    Skin Circulation/Temperature WDL WDL       Cardiac WDL    Cardiac WDL WDL       Peripheral/Neurovascular WDL    Peripheral Neurovascular WDL neurovascular assessment upper       LUE Neurovascular Assessment    Sensation LUE numbness present;tingling present       Cognitive/Neuro/Behavioral WDL    Cognitive/Neuro/Behavioral WDL WDL

## 2023-05-31 NOTE — ED PROVIDER NOTES
"  History     Chief Complaint:  Numbness       HPI   Liana Giraldo is a 83 year old female who presents with 1 week history of intermittent left hand numbness/clumsiness.  She reports these episodes have occurred about once per day over the last week.  They typically only last about 10 minutes, but tonight lasted 20 minutes ultimately prompting her evaluation.  She denies any fevers or chills.  Denies any similar complaints in the past.  Denies headache.  Denies vomiting.  She is currently symptom-free.      Independent Historian:   None - Patient Only    Review of External Notes:   Reviewed urgent care note from about 1 week ago where she complained more so of a tremor and clumsiness, and less so of numbness.      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     Osteopenia      Other and unspecified hyperlipidemia      Personal history of colonic polyps 8/05     Rotator cuff (capsule) sprain 12/01     Synovial cyst of popliteal space 7/30/03     TIA (transient ischemic attack) 2/25/16     Vitamin D deficiency disease 2/23/2013       Past Surgical History:    Past Surgical History:   Procedure Laterality Date     ZZC NONSPECIFIC PROCEDURE      appy        Physical Exam     Patient Vitals for the past 24 hrs:   BP Temp Temp src Pulse Resp SpO2 Height Weight   05/31/23 0022 (!) 171/86 97.6  F (36.4  C) Temporal 66 20 98 % 1.702 m (5' 7\") 72.6 kg (160 lb 0.9 oz)        Physical Exam  Nursing note and vitals reviewed.  HENT:   Mouth/Throat: Moist mucous membranes.   Eyes: EOMI, nonicteric sclera  Cardiovascular: Normal " rate, regular rhythm, no murmurs, rubs, or gallops  Pulmonary/Chest: Effort normal and breath sounds normal. No respiratory distress. No wheezes. No rales.   Abdominal: Soft. Nontender, nondistended, no guarding or rigidity.   Musculoskeletal: Normal range of motion.   Neurological: Alert. Moves all extremities spontaneously.     CN's II-XII intact. 5/5 BUE and BLE strength. PERRL    EOMI without nystagmus.      Sensation intact to light touch. Negative pronator drift.    Finger to nose intact, but perhaps less coordinated on the left.  Skin: Skin is warm and dry. No rash noted.   Psychiatric: Normal mood and affect.     Emergency Department Course     ECG results from 05/31/23   EKG 12 lead     Value    Systolic Blood Pressure     Diastolic Blood Pressure     Ventricular Rate 61    Atrial Rate 61    OR Interval 174    QRS Duration 92        QTc 430    P Axis 41    R AXIS -1    T Axis 41    Interpretation ECG      Sinus rhythm  Low voltage QRS  Borderline ECG  When compared with ECG of 25-FEB-2016 14:57,  No significant change was found           Imaging:  Head CT w/o contrast   Final Result   IMPRESSION:   1.  Subacute subarachnoid hemorrhage in the right precentral sulcus.   2.  The small infarct in the left frontal lobe is better seen on the prior MRI.      MRA Neck (Carotids) wo & w Contrast   Final Result   Abnormal   IMPRESSION:   HEAD MRI:    1.  7 mm acute/early subacute infarct in the anterior left frontal lobe without mass effect or evidence of associated hemorrhage.   2.  Nonspecific abnormal sulcal FLAIR signal and leptomeningeal enhancement within the right precentral sulcus, the exact nature of which is difficult to determine. Absence of abnormal blooming on the GRE sequence would make focal acute subarachnoid    hemorrhage less likely although the possibility of hyperacute hemorrhage is considered. Infection is also considered less likely given the relative focal nature of the finding as well as  absence of diffusion signal. Recommend a noncontrast head CT for    further assessment. The possibility of focal subarachnoid hemorrhage in the setting of reversible cerebral vasoconstriction syndrome is considered.   3.  Underlying mild to moderate volume loss and presumed chronic small vessel ischemic changes.      HEAD MRA:    1.  Normal MRA [place].      NECK MRA:   1.  Normal neck MRA.         [Critical Result: 7 mm acute infarct in the left frontal lobe.]      Finding was identified on 05/31/2023 at 5:14 AM CDT.       Dr. Saul Leavitt was contacted by me on 05/31/2023 at 5:36 AM CDT and verbalized understanding of the critical result.             MRA Brain ([place]) wo Contrast   Final Result   Abnormal   IMPRESSION:   HEAD MRI:    1.  7 mm acute/early subacute infarct in the anterior left frontal lobe without mass effect or evidence of associated hemorrhage.   2.  Nonspecific abnormal sulcal FLAIR signal and leptomeningeal enhancement within the right precentral sulcus, the exact nature of which is difficult to determine. Absence of abnormal blooming on the GRE sequence would make focal acute subarachnoid    hemorrhage less likely although the possibility of hyperacute hemorrhage is considered. Infection is also considered less likely given the relative focal nature of the finding as well as absence of diffusion signal. Recommend a noncontrast head CT for    further assessment. The possibility of focal subarachnoid hemorrhage in the setting of reversible cerebral vasoconstriction syndrome is considered.   3.  Underlying mild to moderate volume loss and presumed chronic small vessel ischemic changes.      HEAD MRA:    1.  Normal MRA [place].      NECK MRA:   1.  Normal neck MRA.         [Critical Result: 7 mm acute infarct in the left frontal lobe.]      Finding was identified on 05/31/2023 at 5:14 AM CDT.       Dr. Saul Leavitt was contacted by me on 05/31/2023 at 5:36 AM CDT  and verbalized understanding of the critical result.             MR Brain w/o & w Contrast   Final Result   Abnormal   IMPRESSION:   HEAD MRI:    1.  7 mm acute/early subacute infarct in the anterior left frontal lobe without mass effect or evidence of associated hemorrhage.   2.  Nonspecific abnormal sulcal FLAIR signal and leptomeningeal enhancement within the right precentral sulcus, the exact nature of which is difficult to determine. Absence of abnormal blooming on the GRE sequence would make focal acute subarachnoid    hemorrhage less likely although the possibility of hyperacute hemorrhage is considered. Infection is also considered less likely given the relative focal nature of the finding as well as absence of diffusion signal. Recommend a noncontrast head CT for    further assessment. The possibility of focal subarachnoid hemorrhage in the setting of reversible cerebral vasoconstriction syndrome is considered.   3.  Underlying mild to moderate volume loss and presumed chronic small vessel ischemic changes.      HEAD MRA:    1.  Normal MRA [place].      NECK MRA:   1.  Normal neck MRA.         [Critical Result: 7 mm acute infarct in the left frontal lobe.]      Finding was identified on 05/31/2023 at 5:14 AM CDT.       Dr. Saul Leavitt was contacted by me on 05/31/2023 at 5:36 AM CDT and verbalized understanding of the critical result.             Echocardiogram Complete - For age > 60 yrs    (Results Pending)      Report per radiology    Laboratory:  Labs Ordered and Resulted from Time of ED Arrival to Time of ED Departure   COMPREHENSIVE METABOLIC PANEL - Abnormal       Result Value    Sodium 142      Potassium 3.9      Chloride 105      Carbon Dioxide (CO2) 25      Anion Gap 12      Urea Nitrogen 11.5      Creatinine 0.77      Calcium 9.1      Glucose 94      Alkaline Phosphatase 66      AST 16      ALT 8 (*)     Protein Total 7.0      Albumin 4.1      Bilirubin Total 0.3      GFR Estimate 77      TROPONIN T, HIGH SENSITIVITY - Normal    Troponin T, High Sensitivity 9     INR - Normal    INR 1.00     GLUCOSE BY METER - Normal    GLUCOSE BY METER POCT 98     ERYTHROCYTE SEDIMENTATION RATE AUTO - Normal    Erythrocyte Sedimentation Rate 20     CRP INFLAMMATION - Normal    CRP Inflammation <3.00     HEMOGLOBIN A1C - Normal    Hemoglobin A1C 5.4     CBC WITH PLATELETS AND DIFFERENTIAL    WBC Count 6.4      RBC Count 3.95      Hemoglobin 12.3      Hematocrit 36.8      MCV 93      MCH 31.1      MCHC 33.4      RDW 12.6      Platelet Count 240      % Neutrophils 50      % Lymphocytes 36      % Monocytes 10      % Eosinophils 3      % Basophils 1      % Immature Granulocytes 0      NRBCs per 100 WBC 0      Absolute Neutrophils 3.3      Absolute Lymphocytes 2.3      Absolute Monocytes 0.6      Absolute Eosinophils 0.2      Absolute Basophils 0.0      Absolute Immature Granulocytes 0.0      Absolute NRBCs 0.0     GLUCOSE MONITOR NURSING POCT   GLUCOSE MONITOR NURSING POCT   GLUCOSE MONITOR NURSING POCT   GLUCOSE MONITOR NURSING POCT   BLOOD CULTURE   BLOOD CULTURE          Emergency Department Course & Assessments:             Interventions:  Medications   gadobutrol (GADAVIST) injection 50 mL (50 mLs Intravenous Not Given 5/31/23 8844)   gadobutrol (GADAVIST) injection 10 mL (10 mLs Intravenous $Given 5/31/23 040)   aspirin (ASA) tablet 325 mg (325 mg Oral $Given 5/31/23 0691)          Independent Interpretation (X-rays, CTs, rhythm strip):  None    Consultations/Discussion of Management or Tests:    The patient arrived in triage where vitals were measured and recorded.   The patient was then escorted back to the emergency department.   The patient's medical records were reviewed.  Nursing notes and vitals were reviewed.    I performed an exam of the patient as documented above. The patient is in agreement with my plan of care.       Social Determinants of Health affecting care:   None    Disposition:  The patient  was admitted to the hospital under the care of Dr. Fuentes. Discussed case with BARBARA Eldridge, who accepts for admission.     Impression & Plan        Medical Decision Making:  Patient presents with 1 week history of intermittent left hand clumsiness and numbness.  She is asymptomatic at time of my initial evaluation, therefore code stroke not initiated.  Considered CVA, mass, bleed, seizure, cervical pathology, among many other etiologies.  Elected to obtain MRI of the brain given chronicity of symptoms and current NIH stroke scale of 0.  Discussed with stroke neurology who agreed.  MRI ultimately shows CVA on the contralateral side of expected area of infarct.  Separate area of brain was noted to have some enhancement and follow-up head CT was recommended which was obtained.  I reviewed head CT in conjunction with stroke neurology, Dr. Ireland.  We did not visualize any hemorrhage, therefore aspirin was initiated.  Subsequent radiologist read as suggesting very small area of subarachnoid hemorrhage.  Given symptoms have been ongoing for 1 week, doubt any clinical worsening.  Discussed with neurosurgery who is comfortable with admission to [facility] and they will formally consult later today.  Also discussed again with stroke neurology.  We are now concerned for potential partial seizure as explaining patient's left upper extremity shaking that ultimately then leads to clumsiness and numbness.  As this is not generalized, it is safe to defer treatment to general neurology consult which is anticipated later today.  Discussed all the above with patient and she is in agreement with plan thus far and answered all questions to best of my ability.  Discussed with hospitalist service and spoke with BARBARA Eldridge, who accepts for admission.      Critical Care time:  was 40 minutes for this patient excluding procedures.    Diagnosis:    ICD-10-CM    1. Acute CVA (cerebrovascular accident) (H)  I63.9       2. Subarachnoid  hemorrhage (H)  I60.9       3. rule out partial seizure  R56.9                 Saul Leavitt MD  05/31/23 1020

## 2023-05-31 NOTE — PLAN OF CARE
ROOM # 204-1    Living Situation (if not independent, order SW consult): home alone- one level  Facility name:  : [place] (daughter)    Activity level at baseline: Ind  Activity level on admit: SBA    Who will be transporting you at discharge: Daughter    Patient registered to observation; given Patient Bill of Rights; given the opportunity to ask questions about observation status and their plan of care.  Patient has been oriented to the observation room, bathroom and call light is in place.    Discussed discharge goals and expectations with patient/family.

## 2023-05-31 NOTE — PHARMACY-ADMISSION MEDICATION HISTORY
Pharmacist Admission Medication History    Admission medication history is complete. The information provided in this note is only as accurate as the sources available at the time of the update.    Medication reconciliation/reorder completed by provider prior to medication history? No    Information Source(s): Patient via in-person    Pertinent Information: None    Changes made to PTA medication list:    Added: None    Deleted: None    Changed: None       Allergies reviewed with patient and updates made in EHR: yes    Medication History Completed By: Neda Sy [facility] 5/31/2023 8:03 AM    Prior to Admission medications    Medication Sig Last Dose Taking? Auth Provider Long Term End Date   aspirin (ASA) 81 MG tablet Take 1 tablet (81 mg) by mouth daily 5/30/2023 at am Yes Dariel Weber MD     Cyanocobalamin (B-12) 1000 MCG SUBL Place 1 tablet under the tongue daily 5/30/2023 at am Yes Dariel Weber MD     LORazepam (ATIVAN) 0.5 MG tablet Take 1 tablet (0.5 mg) by mouth daily as needed for anxiety prn at prn Yes Dariel Weber MD     pravastatin (PRAVACHOL) 80 MG tablet Take 1 tablet by mouth. at bedtime. 5/29/2023 at pm Yes Dariel Weber MD Yes    vitamin D3 (CHOLECALCIFEROL) 2000 units (50 mcg) tablet 1 tab  daily 5/30/2023 at am Yes Dariel Weber MD

## 2023-05-31 NOTE — PLAN OF CARE
"A&O X4. VSS. SBA, ambulated to the restroom. Tolerating regular diet. Saline locked. Tele SR 71.  Head CT shows infarct and subacute SAH. Numbness in LUE has resolved. Q4 neuro assessments WNL. Blood sugar check 101. EEG results pending. Pt denies pain, nausea, vomiting, and diarrhea. Possible discharge tomorrow. Bed in lowest position and call light within reach.             Blood pressure 133/76, pulse 62, temperature 98.1  F (36.7  C), temperature source Oral, resp. rate 18, height 1.702 m (5' 7\"), weight 70.7 kg (155 lb 12.8 oz), SpO2 97 %, not currently breastfeeding.             "

## 2023-05-31 NOTE — CONSULTS
Neurology Consult Note  [facility]       [May 31, 2023]                                                                                       Admission Date: 2023  Hospital Day: 1      Patient: Liana Giraldo      : 1940  MRN:  [id number]     CC:      Chief Complaint   Patient presents with     Numbness       Consult Request:  Referring Provider:  Michelet Fuentes MD  Primary Care Provider:  Dariel Weber MD        HPI:  Liana Giraldo is a 83 year old female with PMhx of HLD, TIA (2016), anxiety, who was admitted on 2023 for episodes of paresthesias in left hand and face for last 1 week.   She developed uncontrolled shaking of her left arm and hand a week ago. She came to the ER but left due to long wait.  She developed episodes of numbness in her fingertips up to her elbow the next day each lasting 5-10 min. She had occasional numbness in her face with these episodes. She denies any numbness in her hands in the past. She was getting 1-2 episodes per day till yesterday but symptoms free today. She had 20 min long episode yesterday night.      She had subacute SAH in the right precentral sulcus, small infarct in the left frontal lobe in the MRI brain      A complete review of symptoms was performed including vascular, infectious, cardiovascular, pulmonary, gastrointestinal, endocrinological, hematologic, dermatologic, musculoskeletal, and neurological. All were normal except as above.    PAST MEDICAL HISTORY:  ALLERGIES:   Allergies   Allergen Reactions     Lipitor [Atorvastatin Calcium]      Myalgias     Oxycodone Nausea and Vomiting     Rosuvastatin Calcium      muscle achiness     Simvastatin      muscle achiness     Tobacco:    History   Smoking Status     Never   Smokeless Tobacco     Never     Alcohol:  Social History    Substance and Sexual Activity      Alcohol use: Yes        Comment: occ    MEDICATIONS:       CURRENTLY SCHEDULED MEDICATIONS     [Held  by provider] pravastatin  80 mg Oral Daily     sodium chloride (PF)  3 mL Intracatheter Q8H     sodium chloride (PF)  60 mL Intravenous Once          HOME MEDICATIONS  Medications Prior to Admission   Medication Sig Dispense Refill Last Dose     aspirin (ASA) 81 MG tablet Take 1 tablet (81 mg) by mouth daily   5/30/2023 at am     Cyanocobalamin (B-12) 1000 MCG SUBL Place 1 tablet under the tongue daily   5/30/2023 at am     LORazepam (ATIVAN) 0.5 MG tablet Take 1 tablet (0.5 mg) by mouth daily as needed for anxiety 30 tablet 0 prn at prn     pravastatin (PRAVACHOL) 80 MG tablet Take 1 tablet by mouth. at bedtime. 90 tablet 3 5/29/2023 at pm     vitamin D3 (CHOLECALCIFEROL) 2000 units (50 mcg) tablet 1 tab  daily 90 tablet 3 5/30/2023 at am     MEDICAL HISTORY  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     Osteopenia      Other and unspecified hyperlipidemia      Personal history of colonic polyps 8/05    Hyperplastic     Rotator cuff (capsule) sprain 12/01     right with 3mm supraspinatous tendon tear     Synovial cyst of popliteal space 7/30/03    right     TIA (transient ischemic attack) 2/25/16     Vitamin D deficiency disease 2/23/2013     SURGICAL HISTORY  Past Surgical History:   Procedure Laterality Date     ZZC NONSPECIFIC PROCEDURE      appy     FAMILY HISTORY    Family History   Problem Relation Age of Onset     Cardiovascular Mother         MI     Cardiovascular Father         Aneurysm     Breast Cancer Sister      C.A.D. Brother         Brother     SOCIAL HISTORY  Social History     Socioeconomic History     Marital status:    Tobacco Use     Smoking status: Never     Smokeless tobacco: Never   Vaping Use     Vaping status: Never Used   Substance and Sexual Activity     Alcohol use: Yes     Comment: occ     Drug use: No     Sexual  "activity: Yes     Partners: Male            Height: 170.2 cm (5' 7\")     Temp: 98.1  F (36.7  C)   Weight: 70.7 kg (155 lb 12.8 oz)    Temp src: Oral         BP: 133/76         Estimated body mass index is 24.4 kg/m  as calculated from the following:    Height as of this encounter: 1.702 m (5' 7\").    Weight as of this encounter: 70.7 kg (155 lb 12.8 oz).    Resp: 18   SpO2: 97 %   O2 Device: None (Room air)     Blood Pressure:   BP Readings from Last 3 Encounters:   23 133/76   23 (!) 148/77   23 128/84     T24 : Temp (24hrs), Av  F (36.7  C), Min:97.6  F (36.4  C), Max:98.2  F (36.8  C)       GENERAL EXAMINATION:  HEENT: PERRLA, EOMI.  Neck: Supple, No carotid bruits.      NEUROLOGICAL EXAMINATION  Mental Status:  Patient is awake, alert, and oriented X3. Speech and language functions are normal. Short- and long-term memory are intact.      Cranial Nerves: Pupils are equal and reactive to light.Visual fields are full to confrontation. Extraocular movements are intact. There is no nystagmus in the horizontal or vertical planes.No facial sensory deficits. No facial weakness. The tongue is midline.     Motor: Muscle tone is normal. There is no pronator drift. There is no muscle atrophy. The strength is 5/5 in upper and lower extremities bilaterally. Deep tendon reflexes are 2+ and symmetric in his biceps, triceps, knees, and ankles. Plantars are flexor. .     Sensory: Patient has normal pin prick and light touch sensation in the upper and lower extremities.     Coordination: .Finger to nose -normal.    Gait: not evaluated..    .  LABORATORY RESULTS         IMAGING RESULTS     Recent Results (from the past 24 hour(s))   MR Brain w/o & w Contrast   Result Value    Radiologist flags 7 mm acute infarct in the left frontal lobe. (AA)    Narrative    EXAM: MR BRAIN WITHOUT AND WITH CONTRAST, MRA NECK (CAROTIDS) WITHOUT AND WITH CONTRAST, MRA BRAIN ([place]) WITHOUT CONTRAST  LOCATION: [facility]  DATE/TIME: 05/31/2023, 5:10 AM CDT    INDICATION: intermittent left arm numbness. Clumsiness for the last week, 10-20 minute episodes about once a day.  COMPARISON: 02/25/2016.  CONTRAST: 10 mL Gadavist.   TECHNIQUE:   1) Routine multiplanar multisequence head MRI without and with intravenous contrast.  2) 3D time-of-flight head MRA without intravenous contrast.  3) Neck MRA without and with IV contrast. Stenosis measurements made according to NASCET criteria unless otherwise specified.    FINDINGS:  HEAD MRI:  INTRACRANIAL CONTENTS: 7 mm focus of restricted diffusion along the cortex of the left middle frontal gyrus. No associated mass effect or evidence of associated hemorrhage. No mass, acute hemorrhage, or extra-axial fluid collections. Patchy nonspecific   T2/FLAIR hyperintensities within the cerebral white matter most consistent with mild to moderate chronic microvascular ischemic change. Mild to moderate generalized cerebral atrophy. No hydrocephalus. Although FLAIR sequences limited by motion, there is   evidence of abnormal FLAIR signal within the right precentral sulcus. There is also leptomeningeal enhancement within this sulcus. Of note, there is no restricted diffusion within the sulcus nor is there abnormal blooming on the GRE sequence.    SELLA: No abnormality accounting for technique.    OSSEOUS STRUCTURES/SOFT TISSUES: Normal marrow signal. The major intracranial vascular flow-voids are maintained.     ORBITS: No abnormality accounting for technique.     SINUSES/MASTOIDS: No paranasal sinus mucosal disease. No middle ear or mastoid effusion.     HEAD MRA:   ANTERIOR CIRCULATION: No stenosis/occlusion, aneurysm, or high-flow vascular malformation. 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    POSTERIOR CIRCULATION: No stenosis/occlusion, aneurysm, or high-flow vascular malformation. Balanced vertebral arteries supply a normal basilar artery.     NECK MRA:   RIGHT  CAROTID: No measurable stenosis or dissection.    LEFT CAROTID: No measurable stenosis or dissection.    VERTEBRAL ARTERIES: No focal stenosis or dissection. Dominant right and smaller left vertebral arteries.    AORTIC ARCH: Classic aortic arch anatomy with no significant stenosis at the origin of the great vessels.      Impression    IMPRESSION:  HEAD MRI:   1.  7 mm acute/early subacute infarct in the anterior left frontal lobe without mass effect or evidence of associated hemorrhage.  2.  Nonspecific abnormal sulcal FLAIR signal and leptomeningeal enhancement within the right precentral sulcus, the exact nature of which is difficult to determine. Absence of abnormal blooming on the GRE sequence would make focal acute subarachnoid   hemorrhage less likely although the possibility of hyperacute hemorrhage is considered. Infection is also considered less likely given the relative focal nature of the finding as well as absence of diffusion signal. Recommend a noncontrast head CT for   further assessment. The possibility of focal subarachnoid hemorrhage in the setting of reversible cerebral vasoconstriction syndrome is considered.  3.  Underlying mild to moderate volume loss and presumed chronic small vessel ischemic changes.    HEAD MRA:   1.  Normal MRA [place] [facility] [place].    NECK MRA:  1.  Normal neck MRA.      [Critical Result: 7 mm acute infarct in the left frontal lobe.]    Finding was identified on 05/31/2023 at 5:14 AM CDT.     Dr. Saul Leavitt was contacted by me on 05/31/2023 at 5:36 AM CDT and verbalized understanding of the critical result.        MRA Brain ([place]) wo Contrast   Result Value    Radiologist flags 7 mm acute infarct in the left frontal lobe. (AA)    Narrative    EXAM: MR BRAIN WITHOUT AND WITH CONTRAST, MRA NECK (CAROTIDS) WITHOUT AND WITH CONTRAST, MRA BRAIN ([place]) WITHOUT CONTRAST  LOCATION: [facility]  DATE/TIME: 05/31/2023, 5:10 AM  CDT    INDICATION: intermittent left arm numbness. Clumsiness for the last week, 10-20 minute episodes about once a day.  COMPARISON: 02/25/2016.  CONTRAST: 10 mL Gadavist.   TECHNIQUE:   1) Routine multiplanar multisequence head MRI without and with intravenous contrast.  2) 3D time-of-flight head MRA without intravenous contrast.  3) Neck MRA without and with IV contrast. Stenosis measurements made according to NASCET criteria unless otherwise specified.    FINDINGS:  HEAD MRI:  INTRACRANIAL CONTENTS: 7 mm focus of restricted diffusion along the cortex of the left middle frontal gyrus. No associated mass effect or evidence of associated hemorrhage. No mass, acute hemorrhage, or extra-axial fluid collections. Patchy nonspecific   T2/FLAIR hyperintensities within the cerebral white matter most consistent with mild to moderate chronic microvascular ischemic change. Mild to moderate generalized cerebral atrophy. No hydrocephalus. Although FLAIR sequences limited by motion, there is   evidence of abnormal FLAIR signal within the right precentral sulcus. There is also leptomeningeal enhancement within this sulcus. Of note, there is no restricted diffusion within the sulcus nor is there abnormal blooming on the GRE sequence.    SELLA: No abnormality accounting for technique.    OSSEOUS STRUCTURES/SOFT TISSUES: Normal marrow signal. The major intracranial vascular flow-voids are maintained.     ORBITS: No abnormality accounting for technique.     SINUSES/MASTOIDS: No paranasal sinus mucosal disease. No middle ear or mastoid effusion.     HEAD MRA:   ANTERIOR CIRCULATION: No stenosis/occlusion, aneurysm, or high-flow vascular malformation. 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    POSTERIOR CIRCULATION: No stenosis/occlusion, aneurysm, or high-flow vascular malformation. Balanced vertebral arteries supply a normal basilar artery.     NECK MRA:   RIGHT CAROTID: No measurable stenosis or dissection.    LEFT CAROTID:  No measurable stenosis or dissection.    VERTEBRAL ARTERIES: No focal stenosis or dissection. Dominant right and smaller left vertebral arteries.    AORTIC ARCH: Classic aortic arch anatomy with no significant stenosis at the origin of the great vessels.      Impression    IMPRESSION:  HEAD MRI:   1.  7 mm acute/early subacute infarct in the anterior left frontal lobe without mass effect or evidence of associated hemorrhage.  2.  Nonspecific abnormal sulcal FLAIR signal and leptomeningeal enhancement within the right precentral sulcus, the exact nature of which is difficult to determine. Absence of abnormal blooming on the GRE sequence would make focal acute subarachnoid   hemorrhage less likely although the possibility of hyperacute hemorrhage is considered. Infection is also considered less likely given the relative focal nature of the finding as well as absence of diffusion signal. Recommend a noncontrast head CT for   further assessment. The possibility of focal subarachnoid hemorrhage in the setting of reversible cerebral vasoconstriction syndrome is considered.  3.  Underlying mild to moderate volume loss and presumed chronic small vessel ischemic changes.    HEAD MRA:   1.  Normal MRA [place].    NECK MRA:  1.  Normal neck MRA.      [Critical Result: 7 mm acute infarct in the left frontal lobe.]    Finding was identified on 05/31/2023 at 5:14 AM CDT.     Dr. Saul Leavitt was contacted by me on 05/31/2023 at 5:36 AM CDT and verbalized understanding of the critical result.        MRA Neck (Carotids) wo & w Contrast   Result Value    Radiologist flags 7 mm acute infarct in the left frontal lobe. (AA)    Narrative    EXAM: MR BRAIN WITHOUT AND WITH CONTRAST, MRA NECK (CAROTIDS) WITHOUT AND WITH CONTRAST, MRA BRAIN ([place] OF [place]) WITHOUT CONTRAST  LOCATION: [facility]  DATE/TIME: 05/31/2023, 5:10 AM CDT    INDICATION: intermittent left arm numbness. Clumsiness for the last  week, 10-20 minute episodes about once a day.  COMPARISON: 02/25/2016.  CONTRAST: 10 mL Gadavist.   TECHNIQUE:   1) Routine multiplanar multisequence head MRI without and with intravenous contrast.  2) 3D time-of-flight head MRA without intravenous contrast.  3) Neck MRA without and with IV contrast. Stenosis measurements made according to NASCET criteria unless otherwise specified.    FINDINGS:  HEAD MRI:  INTRACRANIAL CONTENTS: 7 mm focus of restricted diffusion along the cortex of the left middle frontal gyrus. No associated mass effect or evidence of associated hemorrhage. No mass, acute hemorrhage, or extra-axial fluid collections. Patchy nonspecific   T2/FLAIR hyperintensities within the cerebral white matter most consistent with mild to moderate chronic microvascular ischemic change. Mild to moderate generalized cerebral atrophy. No hydrocephalus. Although FLAIR sequences limited by motion, there is   evidence of abnormal FLAIR signal within the right precentral sulcus. There is also leptomeningeal enhancement within this sulcus. Of note, there is no restricted diffusion within the sulcus nor is there abnormal blooming on the GRE sequence.    SELLA: No abnormality accounting for technique.    OSSEOUS STRUCTURES/SOFT TISSUES: Normal marrow signal. The major intracranial vascular flow-voids are maintained.     ORBITS: No abnormality accounting for technique.     SINUSES/MASTOIDS: No paranasal sinus mucosal disease. No middle ear or mastoid effusion.     HEAD MRA:   ANTERIOR CIRCULATION: No stenosis/occlusion, aneurysm, or high-flow vascular malformation. 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    POSTERIOR CIRCULATION: No stenosis/occlusion, aneurysm, or high-flow vascular malformation. Balanced vertebral arteries supply a normal basilar artery.     NECK MRA:   RIGHT CAROTID: No measurable stenosis or dissection.    LEFT CAROTID: No measurable stenosis or dissection.    VERTEBRAL ARTERIES: No focal  stenosis or dissection. Dominant right and smaller left vertebral arteries.    AORTIC ARCH: Classic aortic arch anatomy with no significant stenosis at the origin of the great vessels.      Impression    IMPRESSION:  HEAD MRI:   1.  7 mm acute/early subacute infarct in the anterior left frontal lobe without mass effect or evidence of associated hemorrhage.  2.  Nonspecific abnormal sulcal FLAIR signal and leptomeningeal enhancement within the right precentral sulcus, the exact nature of which is difficult to determine. Absence of abnormal blooming on the GRE sequence would make focal acute subarachnoid   hemorrhage less likely although the possibility of hyperacute hemorrhage is considered. Infection is also considered less likely given the relative focal nature of the finding as well as absence of diffusion signal. Recommend a noncontrast head CT for   further assessment. The possibility of focal subarachnoid hemorrhage in the setting of reversible cerebral vasoconstriction syndrome is considered.  3.  Underlying mild to moderate volume loss and presumed chronic small vessel ischemic changes.    HEAD MRA:   1.  Normal MRA [place].    NECK MRA:  1.  Normal neck MRA.      [Critical Result: 7 mm acute infarct in the left frontal lobe.]    Finding was identified on 05/31/2023 at 5:14 AM CDT.     Dr. Saul Leavitt was contacted by me on 05/31/2023 at 5:36 AM CDT and verbalized understanding of the critical result.        Head CT w/o contrast    Narrative    EXAM: CT HEAD W/O CONTRAST  LOCATION: [facility]  DATE/TIME: 5/31/2023 6:08 AM CDT    INDICATION: Stroke follow-up with possible hemorrhage on MRI.  COMPARISON: 05/31/2023  TECHNIQUE: Routine CT Head without IV contrast. Multiplanar reformats. Dose reduction techniques were used.    FINDINGS:  INTRACRANIAL CONTENTS: Isodense subarachnoid hemorrhage in the right precentral sulcus, suspicious for subacute subarachnoid hemorrhage. No  evidence of acute hemorrhage. The small infarct in the left frontal lobe is better appreciated on the prior MRI.   Redemonstrated general volume loss and sequelae of chronic microvascular ischemic disease. No hydrocephalus.     VISUALIZED ORBITS/SINUSES/MASTOIDS: No intraorbital abnormality. No paranasal sinus mucosal disease. No middle ear or mastoid effusion.    BONES/SOFT TISSUES: No acute abnormality.      Impression    IMPRESSION:  1.  Subacute subarachnoid hemorrhage in the right precentral sulcus.  2.  The small infarct in the left frontal lobe is better seen on the prior MRI.   Echocardiogram Complete - For age > 60 yrs    Narrative    [id number]  [id number]  [id number]  [id number]^CLAUDIA^GLENN^NINA     [facility]  Echocardiography Laboratory  [address] [place] [address]     Name: GRISELDA AVILA  MRN: [id number]  : 1940  Study Date: 2023 01:33 PM  Age: 83 yrs  Gender: Female  Patient Location: [facility]  Reason For Study: Cerebrovascular Incident  Ordering Physician: GLENN TAYLOR  Referring Physician: Dariel Weber  Performed By: Amy Garcias RDCS     BSA: 1.8 m2  Height: 67 in  Weight: 160 lb  BP: 136/72 mmHg  ______________________________________________________________________________  Procedure  Complete Portable Echo Adult.  ______________________________________________________________________________  Interpretation Summary     No cardiac source of emboli noted.  ______________________________________________________________________________  Left Ventricle  The left ventricle is normal in structure, function and size. A sigmoid septum  is present. Left ventricular diastolic function is normal.     Right Ventricle  The right ventricle is normal in structure, function and size.     Atria  Normal left atrial size. Right atrial size is normal.     Mitral Valve  The mitral valve is normal in structure and function. There is mild mitral  annular calcification. There  is trace to mild mitral regurgitation.     Tricuspid Valve  Normal tricuspid valve.     Aortic Valve  There is mild trileaflet aortic sclerosis.     Pulmonic Valve  The pulmonic valve is not well seen, but is grossly normal.     Vessels  The aortic root is normal size. The ascending aorta is Mildly dilated. The  inferior vena cava is normal.     Pericardium  There is no pericardial effusion.     Rhythm  Sinus rhythm was noted.  ______________________________________________________________________________  MMode/2D Measurements & Calculations     IVSd: 0.93 cm  LVIDd: 4.1 cm  LVIDs: 2.6 cm  LVPWd: 1.0 cm  IVC diam: 1.6 cm  FS: 37.1 %  LV mass(C)d: 127.2 grams  LV mass(C)dI: 69.1 grams/m2  Ao root diam: 3.0 cm  asc Aorta Diam: 4.2 cm  RV Base: 2.5 cm  RWT: 0.49  TAPSE: 1.7 cm     Doppler Measurements & Calculations  MV E max lowell: 87.5 cm/sec  MV A max lowell: 106.0 cm/sec  MV E/A: 0.83  MV max P.2 mmHg  MV mean P.9 mmHg  MV V2 VTI: 36.4 cm  MV dec time: 0.26 sec  Ao V2 max: 243.0 cm/sec  Ao max P.6 mmHg  Ao V2 mean: 159.8 cm/sec  Ao mean P.7 mmHg  Ao V2 VTI: 54.0 cm  LV V1 max P.7 mmHg  LV V1 max: 129.4 cm/sec  LV V1 VTI: 27.5 cm     AV [place]ell Ratio (DI): 0.53  E/E' avg: 10.8  Lateral E/e': 10.1  Medial E/e': 11.5  RV S [place]ell: 12.1 cm/sec     ______________________________________________________________________________  Report approved by: Maria Del Carmen Pang 2023 03:45 PM             _____________________________________________________________________________  _____________________________________________________________________________          ASSESSMENT     Episodes of involuntary jerking of left upper extremity and numbness of left face and arm: 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        RECOMMENDATIONS   EEG pending  Start patient on Keppra 500 mg twice a day.

## 2023-05-31 NOTE — CONSULTS
[facility]    Stroke Telephone Note    I was called by Saul Leavitt on 05/31/23 regarding patient Liana Giraldo.     Liana Giraldo is a 84 YO F w/vascular RFs: prior TIA involving L sensory disturbance on ASA, HLD on statin, ??untreated HTN who presents on 5/31 for days of transient episodic  LUE sensory disturbance.     She decided to come to ED tonight given this episode lasted the longest - 20 minutes. Prior episodes have lasted < 10 min. These events have occurred for days now.     In further chart review she called PCP given LUE ataxia on 5/24 - describes days of LUE 'doesn't work right'.with associated shaking. She went to ED that day but went home given long ED wait time.     In 2016 she went to ED for L face sensory disturbance - MRI/MRA negative for neurovascular lesion at this time, this was her TIA event.     In ED exam reported to be non-focal.     In ED /86. Initial labs unremarkable.     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 Given recurrent, transient, episodic events of LUE sensory disturbance, unless significant R cervical carotid or intracranial ICA steno-occlusive or new acute/subacut stroke in corresponding vascular territory is seen, stroke mimic is most likely as central embolic events causing the exact same symptoms is very highly unlikely. It is possible she has new subacute stroke with recrudescence/fuctuating symptoms given similar events of LUE sensory disturbance and LUE ataxia noted on 5/24.   - Agree with MRA H/N, MRI brain  - Please page stroke neuro if subacute stroke or symptomatic neurovascular lesion is seen on imaging  - If imaging is negative, would not pursue further stroke workup as stroke mimic is most likely etiology per above, and pt should f/u with Gen Neuro as outpt and pt can cont PTA ASA     My recommendations are based on the information provided over the phone by Liana Giraldo's in-person providers.  "They are not intended to replace the clinical judgment of her in-person providers. I was not requested to personally see or examine the patient at this time.    The Stroke Staff is Dr. Carlos Ireland MD  Vascular Neurology Fellow    To page me or covering stroke neurology team member, click here: [facility]  Choose \"On Call\" tab at top, then select \"NEUROLOGY/ALL SITES\" from middle drop-down box, press Enter, then look for \"stroke\" or \"telestroke\" for your site.      "

## 2023-06-01 ENCOUNTER — APPOINTMENT (OUTPATIENT)
Dept: CARDIOLOGY | Facility: CLINIC | Age: 83
DRG: 064 | End: 2023-06-01
Attending: PHYSICIAN ASSISTANT
Payer: COMMERCIAL

## 2023-06-01 ENCOUNTER — APPOINTMENT (OUTPATIENT)
Dept: EDUCATION SERVICES | Facility: CLINIC | Age: 83
End: 2023-06-01
Attending: PHYSICIAN ASSISTANT
Payer: COMMERCIAL

## 2023-06-01 VITALS
OXYGEN SATURATION: 97 % | RESPIRATION RATE: 15 BRPM | HEIGHT: 67 IN | SYSTOLIC BLOOD PRESSURE: 146 MMHG | WEIGHT: 155.8 LBS | TEMPERATURE: 98.4 F | BODY MASS INDEX: 24.45 KG/M2 | DIASTOLIC BLOOD PRESSURE: 74 MMHG | HEART RATE: 59 BPM

## 2023-06-01 LAB
ANION GAP SERPL CALCULATED.3IONS-SCNC: 11 MMOL/L (ref 7–15)
BUN SERPL-MCNC: 14.9 MG/DL (ref 8–23)
CALCIUM SERPL-MCNC: 8.9 MG/DL (ref 8.8–10.2)
CHLORIDE SERPL-SCNC: 106 MMOL/L (ref 98–107)
CHOLEST SERPL-MCNC: 271 MG/DL
CREAT SERPL-MCNC: 0.75 MG/DL (ref 0.51–0.95)
DEPRECATED HCO3 PLAS-SCNC: 25 MMOL/L (ref 22–29)
ERYTHROCYTE [DISTWIDTH] IN BLOOD BY AUTOMATED COUNT: 12.5 % (ref 10–15)
GFR SERPL CREATININE-BSD FRML MDRD: 79 ML/MIN/1.73M2
GLUCOSE BLDC GLUCOMTR-MCNC: 104 MG/DL (ref 70–99)
GLUCOSE BLDC GLUCOMTR-MCNC: 87 MG/DL (ref 70–99)
GLUCOSE SERPL-MCNC: 97 MG/DL (ref 70–99)
HCT VFR BLD AUTO: 35.9 % (ref 35–47)
HDLC SERPL-MCNC: 58 MG/DL
HGB BLD-MCNC: 12.1 G/DL (ref 11.7–15.7)
LDLC SERPL CALC-MCNC: 199 MG/DL
MCH RBC QN AUTO: 31.6 PG (ref 26.5–33)
MCHC RBC AUTO-ENTMCNC: 33.7 G/DL (ref 31.5–36.5)
MCV RBC AUTO: 94 FL (ref 78–100)
NONHDLC SERPL-MCNC: 213 MG/DL
PLATELET # BLD AUTO: 226 10E3/UL (ref 150–450)
POTASSIUM SERPL-SCNC: 4 MMOL/L (ref 3.4–5.3)
RBC # BLD AUTO: 3.83 10E6/UL (ref 3.8–5.2)
SODIUM SERPL-SCNC: 142 MMOL/L (ref 136–145)
TRIGL SERPL-MCNC: 72 MG/DL
WBC # BLD AUTO: 4.7 10E3/UL (ref 4–11)

## 2023-06-01 PROCEDURE — 93272 ECG/REVIEW INTERPRET ONLY: CPT | Performed by: INTERNAL MEDICINE

## 2023-06-01 PROCEDURE — 250N000013 HC RX MED GY IP 250 OP 250 PS 637: Performed by: PSYCHIATRY & NEUROLOGY

## 2023-06-01 PROCEDURE — 80048 BASIC METABOLIC PNL TOTAL CA: CPT | Performed by: PHYSICIAN ASSISTANT

## 2023-06-01 PROCEDURE — 36415 COLL VENOUS BLD VENIPUNCTURE: CPT | Performed by: PHYSICIAN ASSISTANT

## 2023-06-01 PROCEDURE — 99238 HOSP IP/OBS DSCHRG MGMT 30/<: CPT | Performed by: PHYSICIAN ASSISTANT

## 2023-06-01 PROCEDURE — 93270 REMOTE 30 DAY ECG REV/REPORT: CPT

## 2023-06-01 PROCEDURE — 80061 LIPID PANEL: CPT | Performed by: PHYSICIAN ASSISTANT

## 2023-06-01 PROCEDURE — 85014 HEMATOCRIT: CPT | Performed by: PHYSICIAN ASSISTANT

## 2023-06-01 RX ORDER — LEVETIRACETAM 500 MG/1
500 TABLET ORAL 2 TIMES DAILY
Qty: 60 TABLET | Refills: 0 | Status: SHIPPED | OUTPATIENT
Start: 2023-06-01 | End: 2023-07-01

## 2023-06-01 RX ADMIN — LEVETIRACETAM 500 MG: 500 TABLET, FILM COATED ORAL at 11:16

## 2023-06-01 ASSESSMENT — ACTIVITIES OF DAILY LIVING (ADL)
ADLS_ACUITY_SCORE: 20

## 2023-06-01 NOTE — PLAN OF CARE
"[id number]    Inpatient Progress Note:    /58 (BP Location: Right arm)   Pulse 54   Temp 98.3  F (36.8  C) (Oral)   Resp 16   Ht 1.702 m (5' 7\")   Wt 70.7 kg (155 lb 12.8 oz)   LMP  (LMP Unknown)   SpO2 96%   BMI 24.40 kg/m       Pt Aox4. Denies pain, nausea, and numbness/tingling. All neuros intact. Ind in room. PIV SL. Seizure pads on side rails. Tele SB 50's. Tolerating regular diet. Started on Keppra. General neurology following. Will continue to monitor and provide supportive cares.     "

## 2023-06-01 NOTE — DISCHARGE SUMMARY
"[facility]  Hospitalist Discharge Summary      Date of Admission:  5/31/2023  Date of Discharge:  6/1/2023  Discharging Provider: Miladis Barahona PA-C  Discharge Service: Hospitalist Service    Discharge Diagnoses   Left arm discoordination, possible seizure  CVA        Clinically Significant Risk Factors          Follow-ups Needed After Discharge   Follow-up Appointments     Follow-up and recommended labs and tests       Follow-up with primary care in the next 1 to 2 weeks.  You will need a   repeat MRI brain  Follow up with neurology for possible seizure             Unresulted Labs Ordered in the Past 30 Days of this Admission     Date and Time Order Name Status Description    5/31/2023  6:48 AM Blood Culture Arm, Left Preliminary     5/31/2023  6:48 AM Blood Culture Hand, Right Preliminary       These results will be followed up by primary care provider    Discharge Disposition   Discharged to home  Condition at discharge: Stable    Hospital Course    Liana Giraldo is a 83 year old female with PMhx of HLD, TIA (2016), anxiety, who was admitted on 5/31/2023 after presenting with transient sensory disturbance of her left hand.     \"Ms. Giraldo first noticed development of a numbness in her fingertips up to her elbow about 1 week ago.  Not painful.  Episodes lasting about 10 minutes.  She would try to move her hands to see if the numbness would resolve with time.  She was evaluated last week by her primary at [facility] when this first happened and they recommended follow-up with a neurologist and was referred to the ED although she was not evaluated.  Has been having ongoing episodes since then not associated with other symptoms or LOC. Last night due to persistent symptoms episode lasting longer about 20 minutes presented to the emergency department for evaluation.  Has had no loss of consciousness.  No pain in her left upper extremity or neck.  Episodes are not painful. She has " "also described as her hand \"doesn't work right\". She reports mild discomfort with turning her neck laterally that has been going on for several months as well as shoulder discomfort.   Denies any history of prior neurologic problems or stroke.  She has never been on medications to treat seizure.  She has had no headache.  No weakness in her legs.  She is at baseline ambulatory and does not use assistive devices.  She denies any history of neuropathy.  No recent medication changes.  She has had no fever chest pain or shortness of breath.  She reports fatigue with this has been going on for months\"    In the ED she was afebrile with pulse 66, pressure 153/82 and breathing comfortably on room air without hypoxia. Labs unremarkable, INR normal. MRI brain with  7 mm acute/early subacute infarct in the anterior left frontal lobe without mass effect or evidence of associated hemorrhage. Nonspecific abnormal sulcal FLAIR signal and leptomeningeal enhancement within the right precentral sulcus, undetermined. Per Radiology - Absence of abnormal blooming on the GRE sequence would make focal acute subarachnoid  hemorrhage less likely although the possibility of hyperacute hemorrhage is considered. Infection is also considered less likely given the relative focal nature of the finding as well as absence of diffusion signal. Recommend a noncontrast head CT for   further assessment. The possibility of focal subarachnoid hemorrhage in the setting of reversible cerebral vasoconstriction syndrome is considered. Underlying mild to moderate volume loss and presumed chronic small vessel ischemic changes.   MRI was followed by noncontrast head CT which showed a subacute SAH in the right precentral sulcus.     Vascular neurology was consulted who did not think the presentation of subarachnoid hemorrhage was concerning and recommended no further follow-up.  Stroke neurology consulted who believes the stroke was embolic in nature and " recommend starting aspirin 81 mg daily, continue with pravastatin 80 mg daily and having a cardiac event monitor placed.  Ideally blood pressure would be less than 140/90 and A1c goal of less than 7.  They were concerned that her left arm discoordination was related to seizure so neurology was consulted and started her on Keppra.  EEG was performed and read as normal but they would like her to continue on Keppra at this time.    She should follow-up with repeat MRI brain in 1 to 2 weeks, primary care in 1 to 2 weeks and neurology for follow-up of MRI and possible seizure disorder.  At the time of discharge she was having no left arm symptoms             Consultations This Hospital Stay   NEUROLOGY IP CONSULT  NEUROSURGERY IP CONSULT  PATIENT LEARNING CENTER IP CONSULT  NEUROLOGY IP STROKE CONSULT  SPEECH LANGUAGE PATH ADULT IP CONSULT  PHARMACY IP CONSULT  PHARMACY IP CONSULT  PHARMACY IP CONSULT  OCCUPATIONAL THERAPY ADULT IP CONSULT  CARE MANAGEMENT / SOCIAL WORK IP CONSULT    Code Status   Full Code    Time Spent on this Encounter   IMiladis PA-C, personally saw the patient today and spent less than or equal to 30 minutes discharging this patient.       Miladis Barahona PA-C  [facility] OBSERVATION DEPT  [address] [place] [address]  Phone: [phone]  ______________________________________________________________________    Physical Exam   Vital Signs: Temp: 98.4  F (36.9  C) Temp src: Oral BP: (!) 146/74 Pulse: 59   Resp: 15 SpO2: 97 % O2 Device: None (Room air)    Weight: 155 lbs 12.8 oz  General Appearance: Alert and oriented x3  Respiratory: Clear to auscultation bilaterally  Cardiovascular: RRR without murmur  GI: Bowel sounds are present without tenderness  Skin: No rashes or open sores are noted         Primary Care Physician   Dariel Weber    Discharge Orders      Primary Care - Care Coordination Referral      Reason for your hospital stay    You  were admitted for concern of left arm sensory disturbance.  MRI imaging showed leptomeningeal enhancement within the right precentral sulcus. Finding initially felt to represent blood, however, upon review with neuroradiology, this is felt to be unlikely.  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    You were seen by stroke neurology who would like you to continue your pravastatin, start a baby aspirin and have a 30-day event monitor with repeat MRI brain in 1-2 weeks    You were seen by neurosurgery who did not believe the brain imaging represented a bleed    You were seen by neurology with concern of your left arm sensory deficit being a seizure and they started you on Keppra 500 mg twice daily.  You will need to follow-up with them in clinic.     Follow-up and recommended labs and tests     Follow-up with primary care in the next 1 to 2 weeks.  You will need a repeat MRI brain  Follow up with neurology for possible seizure     Activity    Your activity upon discharge: activity as tolerated     Diet    Follow this diet upon discharge: Regular       Significant Results and Procedures   Most Recent 3 CBC's:Recent Labs   Lab Test 06/01/23  0750 05/31/23  0043 05/24/23  1607   WBC 4.7 6.4 6.4   HGB 12.1 12.3 13.1   MCV 94 93 95    240 244     Most Recent 3 BMP's:Recent Labs   Lab Test 06/01/23  1311 06/01/23  0750 06/01/23  0256 05/31/23  1732 05/31/23  0043 05/31/23  0033 05/24/23  1607   NA  --  142  --   --  142  --  141   POTASSIUM  --  4.0  --   --  3.9  --  4.5   CHLORIDE  --  106  --   --  105  --  109   CO2  --  25  --   --  25  --  32   BUN  --  14.9  --   --  11.5  --  16   CR  --  0.75  --   --  0.77  --  0.60   ANIONGAP  --  11  --   --  12  --  <1*   KYRA  --  8.9  --   --  9.1  --  9.7   GLC 87 97 104*   < > 94   < > 117*    < > = values in this interval not displayed.   ,   Results for orders placed or performed during the hospital  encounter of 05/31/23   MR Brain w/o & w Contrast     Value    Radiologist flags 7 mm acute infarct in the left frontal lobe. (AA)    Narrative    EXAM: MR BRAIN WITHOUT AND WITH CONTRAST, MRA NECK (CAROTIDS) WITHOUT AND WITH CONTRAST, MRA BRAIN ([place]) WITHOUT CONTRAST  LOCATION: [facility]  DATE/TIME: 05/31/2023, 5:10 AM CDT    INDICATION: intermittent left arm numbness. Clumsiness for the last week, 10-20 minute episodes about once a day.  COMPARISON: 02/25/2016.  CONTRAST: 10 mL Gadavist.   TECHNIQUE:   1) Routine multiplanar multisequence head MRI without and with intravenous contrast.  2) 3D time-of-flight head MRA without intravenous contrast.  3) Neck MRA without and with IV contrast. Stenosis measurements made according to NASCET criteria unless otherwise specified.    FINDINGS:  HEAD MRI:  INTRACRANIAL CONTENTS: 7 mm focus of restricted diffusion along the cortex of the left middle frontal gyrus. No associated mass effect or evidence of associated hemorrhage. No mass, acute hemorrhage, or extra-axial fluid collections. Patchy nonspecific   T2/FLAIR hyperintensities within the cerebral white matter most consistent with mild to moderate chronic microvascular ischemic change. Mild to moderate generalized cerebral atrophy. No hydrocephalus. Although FLAIR sequences limited by motion, there is   evidence of abnormal FLAIR signal within the right precentral sulcus. There is also leptomeningeal enhancement within this sulcus. Of note, there is no restricted diffusion within the sulcus nor is there abnormal blooming on the GRE sequence.    SELLA: No abnormality accounting for technique.    OSSEOUS STRUCTURES/SOFT TISSUES: Normal marrow signal. The major intracranial vascular flow-voids are maintained.     ORBITS: No abnormality accounting for technique.     SINUSES/MASTOIDS: No paranasal sinus mucosal disease. No middle ear or mastoid effusion.     HEAD MRA:   ANTERIOR  CIRCULATION: No stenosis/occlusion, aneurysm, or high-flow vascular malformation. 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    POSTERIOR CIRCULATION: No stenosis/occlusion, aneurysm, or high-flow vascular malformation. Balanced vertebral arteries supply a normal basilar artery.     NECK MRA:   RIGHT CAROTID: No measurable stenosis or dissection.    LEFT CAROTID: No measurable stenosis or dissection.    VERTEBRAL ARTERIES: No focal stenosis or dissection. Dominant right and smaller left vertebral arteries.    AORTIC ARCH: Classic aortic arch anatomy with no significant stenosis at the origin of the great vessels.      Impression    IMPRESSION:  HEAD MRI:   1.  7 mm acute/early subacute infarct in the anterior left frontal lobe without mass effect or evidence of associated hemorrhage.  2.  Nonspecific abnormal sulcal FLAIR signal and leptomeningeal enhancement within the right precentral sulcus, the exact nature of which is difficult to determine. Absence of abnormal blooming on the GRE sequence would make focal acute subarachnoid   hemorrhage less likely although the possibility of hyperacute hemorrhage is considered. Infection is also considered less likely given the relative focal nature of the finding as well as absence of diffusion signal. Recommend a noncontrast head CT for   further assessment. The possibility of focal subarachnoid hemorrhage in the setting of reversible cerebral vasoconstriction syndrome is considered.  3.  Underlying mild to moderate volume loss and presumed chronic small vessel ischemic changes.    HEAD MRA:   1.  Normal MRA [place].    NECK MRA:  1.  Normal neck MRA.      [Critical Result: 7 mm acute infarct in the left frontal lobe.]    Finding was identified on 05/31/2023 at 5:14 AM CDT.     Dr. Saul Leavitt was contacted by me on 05/31/2023 at 5:36 AM CDT and verbalized understanding of the critical result.        MRA Brain ([place]) wo Contrast     Value     Radiologist flags 7 mm acute infarct in the left frontal lobe. (AA)    Narrative    EXAM: MR BRAIN WITHOUT AND WITH CONTRAST, MRA NECK (CAROTIDS) WITHOUT AND WITH CONTRAST, MRA BRAIN ([place] OF [place]IZ) WITHOUT CONTRAST  LOCATION: [facility]  DATE/TIME: 05/31/2023, 5:10 AM CDT    INDICATION: intermittent left arm numbness. Clumsiness for the last week, 10-20 minute episodes about once a day.  COMPARISON: 02/25/2016.  CONTRAST: 10 mL Gadavist.   TECHNIQUE:   1) Routine multiplanar multisequence head MRI without and with intravenous contrast.  2) 3D time-of-flight head MRA without intravenous contrast.  3) Neck MRA without and with IV contrast. Stenosis measurements made according to NASCET criteria unless otherwise specified.    FINDINGS:  HEAD MRI:  INTRACRANIAL CONTENTS: 7 mm focus of restricted diffusion along the cortex of the left middle frontal gyrus. No associated mass effect or evidence of associated hemorrhage. No mass, acute hemorrhage, or extra-axial fluid collections. Patchy nonspecific   T2/FLAIR hyperintensities within the cerebral white matter most consistent with mild to moderate chronic microvascular ischemic change. Mild to moderate generalized cerebral atrophy. No hydrocephalus. Although FLAIR sequences limited by motion, there is   evidence of abnormal FLAIR signal within the right precentral sulcus. There is also leptomeningeal enhancement within this sulcus. Of note, there is no restricted diffusion within the sulcus nor is there abnormal blooming on the GRE sequence.    SELLA: No abnormality accounting for technique.    OSSEOUS STRUCTURES/SOFT TISSUES: Normal marrow signal. The major intracranial vascular flow-voids are maintained.     ORBITS: No abnormality accounting for technique.     SINUSES/MASTOIDS: No paranasal sinus mucosal disease. No middle ear or mastoid effusion.     HEAD MRA:   ANTERIOR CIRCULATION: No stenosis/occlusion, aneurysm, or high-flow vascular  malformation. 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    POSTERIOR CIRCULATION: No stenosis/occlusion, aneurysm, or high-flow vascular malformation. Balanced vertebral arteries supply a normal basilar artery.     NECK MRA:   RIGHT CAROTID: No measurable stenosis or dissection.    LEFT CAROTID: No measurable stenosis or dissection.    VERTEBRAL ARTERIES: No focal stenosis or dissection. Dominant right and smaller left vertebral arteries.    AORTIC ARCH: Classic aortic arch anatomy with no significant stenosis at the origin of the great vessels.      Impression    IMPRESSION:  HEAD MRI:   1.  7 mm acute/early subacute infarct in the anterior left frontal lobe without mass effect or evidence of associated hemorrhage.  2.  Nonspecific abnormal sulcal FLAIR signal and leptomeningeal enhancement within the right precentral sulcus, the exact nature of which is difficult to determine. Absence of abnormal blooming on the GRE sequence would make focal acute subarachnoid   hemorrhage less likely although the possibility of hyperacute hemorrhage is considered. Infection is also considered less likely given the relative focal nature of the finding as well as absence of diffusion signal. Recommend a noncontrast head CT for   further assessment. The possibility of focal subarachnoid hemorrhage in the setting of reversible cerebral vasoconstriction syndrome is considered.  3.  Underlying mild to moderate volume loss and presumed chronic small vessel ischemic changes.    HEAD MRA:   1.  Normal MRA [place].    NECK MRA:  1.  Normal neck MRA.      [Critical Result: 7 mm acute infarct in the left frontal lobe.]    Finding was identified on 05/31/2023 at 5:14 AM CDT.     Dr. Saul Leavitt was contacted by me on 05/31/2023 at 5:36 AM CDT and verbalized understanding of the critical result.        MRA Neck (Carotids) wo & w Contrast     Value    Radiologist flags 7 mm acute infarct in the left frontal lobe. (AA)     Narrative    EXAM: MR BRAIN WITHOUT AND WITH CONTRAST, MRA NECK (CAROTIDS) WITHOUT AND WITH CONTRAST, MRA BRAIN ([place]) WITHOUT CONTRAST  LOCATION: [facility]  DATE/TIME: 05/31/2023, 5:10 AM CDT    INDICATION: intermittent left arm numbness. Clumsiness for the last week, 10-20 minute episodes about once a day.  COMPARISON: 02/25/2016.  CONTRAST: 10 mL Gadavist.   TECHNIQUE:   1) Routine multiplanar multisequence head MRI without and with intravenous contrast.  2) 3D time-of-flight head MRA without intravenous contrast.  3) Neck MRA without and with IV contrast. Stenosis measurements made according to NASCET criteria unless otherwise specified.    FINDINGS:  HEAD MRI:  INTRACRANIAL CONTENTS: 7 mm focus of restricted diffusion along the cortex of the left middle frontal gyrus. No associated mass effect or evidence of associated hemorrhage. No mass, acute hemorrhage, or extra-axial fluid collections. Patchy nonspecific   T2/FLAIR hyperintensities within the cerebral white matter most consistent with mild to moderate chronic microvascular ischemic change. Mild to moderate generalized cerebral atrophy. No hydrocephalus. Although FLAIR sequences limited by motion, there is   evidence of abnormal FLAIR signal within the right precentral sulcus. There is also leptomeningeal enhancement within this sulcus. Of note, there is no restricted diffusion within the sulcus nor is there abnormal blooming on the GRE sequence.    SELLA: No abnormality accounting for technique.    OSSEOUS STRUCTURES/SOFT TISSUES: Normal marrow signal. The major intracranial vascular flow-voids are maintained.     ORBITS: No abnormality accounting for technique.     SINUSES/MASTOIDS: No paranasal sinus mucosal disease. No middle ear or mastoid effusion.     HEAD MRA:   ANTERIOR CIRCULATION: No stenosis/occlusion, aneurysm, or high-flow vascular malformation. 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    POSTERIOR  CIRCULATION: No stenosis/occlusion, aneurysm, or high-flow vascular malformation. Balanced vertebral arteries supply a normal basilar artery.     NECK MRA:   RIGHT CAROTID: No measurable stenosis or dissection.    LEFT CAROTID: No measurable stenosis or dissection.    VERTEBRAL ARTERIES: No focal stenosis or dissection. Dominant right and smaller left vertebral arteries.    AORTIC ARCH: Classic aortic arch anatomy with no significant stenosis at the origin of the great vessels.      Impression    IMPRESSION:  HEAD MRI:   1.  7 mm acute/early subacute infarct in the anterior left frontal lobe without mass effect or evidence of associated hemorrhage.  2.  Nonspecific abnormal sulcal FLAIR signal and leptomeningeal enhancement within the right precentral sulcus, the exact nature of which is difficult to determine. Absence of abnormal blooming on the GRE sequence would make focal acute subarachnoid   hemorrhage less likely although the possibility of hyperacute hemorrhage is considered. Infection is also considered less likely given the relative focal nature of the finding as well as absence of diffusion signal. Recommend a noncontrast head CT for   further assessment. The possibility of focal subarachnoid hemorrhage in the setting of reversible cerebral vasoconstriction syndrome is considered.  3.  Underlying mild to moderate volume loss and presumed chronic small vessel ischemic changes.    HEAD MRA:   1.  Normal MRA [place].    NECK MRA:  1.  Normal neck MRA.      [Critical Result: 7 mm acute infarct in the left frontal lobe.]    Finding was identified on 05/31/2023 at 5:14 AM CDT.     Dr. Saul Leavitt was contacted by me on 05/31/2023 at 5:36 AM CDT and verbalized understanding of the critical result.        Head CT w/o contrast    Narrative    EXAM: CT HEAD W/O CONTRAST  LOCATION: [facility]  DATE/TIME: 5/31/2023 6:08 AM CDT    INDICATION: Stroke follow-up with possible hemorrhage  on MRI.  COMPARISON: 2023  TECHNIQUE: Routine CT Head without IV contrast. Multiplanar reformats. Dose reduction techniques were used.    FINDINGS:  INTRACRANIAL CONTENTS: Isodense subarachnoid hemorrhage in the right precentral sulcus, suspicious for subacute subarachnoid hemorrhage. No evidence of acute hemorrhage. The small infarct in the left frontal lobe is better appreciated on the prior MRI.   Redemonstrated general volume loss and sequelae of chronic microvascular ischemic disease. No hydrocephalus.     VISUALIZED ORBITS/SINUSES/MASTOIDS: No intraorbital abnormality. No paranasal sinus mucosal disease. No middle ear or mastoid effusion.    BONES/SOFT TISSUES: No acute abnormality.      Impression    IMPRESSION:  1.  Subacute subarachnoid hemorrhage in the right precentral sulcus.  2.  The small infarct in the left frontal lobe is better seen on the prior MRI.   Echocardiogram Complete - For age > 60 yrs    Narrative    [id number]  [id number]  [id number]  [id number]^CLAUDIA^GLENN^NINA     [facility]  Echocardiography Laboratory  [address] [place] [address]     Name: GRISELDA AVILA  MRN: [id number]  : 1940  Study Date: 2023 01:33 PM  Age: 83 yrs  Gender: Female  Patient Location: [facility]  Reason For Study: Cerebrovascular Incident  Ordering Physician: GLENN TAYLOR  Referring Physician: Dariel Weber  Performed By: Amy Garcias RDCS     BSA: 1.8 m2  Height: 67 in  Weight: 160 lb  BP: 136/72 mmHg  ______________________________________________________________________________  Procedure  Complete Portable Echo Adult.  ______________________________________________________________________________  Interpretation Summary     No cardiac source of emboli noted.  ______________________________________________________________________________  Left Ventricle  The left ventricle is normal in structure, function and size. A sigmoid septum  is present. Left ventricular  diastolic function is normal.     Right Ventricle  The right ventricle is normal in structure, function and size.     Atria  Normal left atrial size. Right atrial size is normal.     Mitral Valve  The mitral valve is normal in structure and function. There is mild mitral  annular calcification. There is trace to mild mitral regurgitation.     Tricuspid Valve  Normal tricuspid valve.     Aortic Valve  There is mild trileaflet aortic sclerosis.     Pulmonic Valve  The pulmonic valve is not well seen, but is grossly normal.     Vessels  The aortic root is normal size. The ascending aorta is Mildly dilated. The  inferior vena cava is normal.     Pericardium  There is no pericardial effusion.     Rhythm  Sinus rhythm was noted.  ______________________________________________________________________________  MMode/2D Measurements & Calculations     IVSd: 0.93 cm  LVIDd: 4.1 cm  LVIDs: 2.6 cm  LVPWd: 1.0 cm  IVC diam: 1.6 cm  FS: 37.1 %  LV mass(C)d: 127.2 grams  LV mass(C)dI: 69.1 grams/m2  Ao root diam: 3.0 cm  asc Aorta Diam: 4.2 cm  RV Base: 2.5 cm  RWT: 0.49  TAPSE: 1.7 cm     Doppler Measurements & Calculations  MV E max lowell: 87.5 cm/sec  MV A max lowell: 106.0 cm/sec  MV E/A: 0.83  MV max P.2 mmHg  MV mean P.9 mmHg  MV V2 VTI: 36.4 cm  MV dec time: 0.26 sec  Ao V2 max: 243.0 cm/sec  Ao max P.6 mmHg  Ao V2 mean: 159.8 cm/sec  Ao mean P.7 mmHg  Ao V2 VTI: 54.0 cm  LV V1 max P.7 mmHg  LV V1 max: 129.4 cm/sec  LV V1 VTI: 27.5 cm     AV [place] Ratio (DI): 0.53  E/E' avg: 10.8  Lateral E/e': 10.1  Medial E/e': 11.5  RV S [place]: 12.1 cm/sec     ______________________________________________________________________________  Report approved by: Maria Del Carmen Pang 2023 03:45 PM               Discharge Medications   Current Discharge Medication List      START taking these medications    Details   levETIRAcetam (KEPPRA) 500 MG tablet Take 1 tablet (500 mg) by mouth 2 times daily for 30 days  Qty: 60 tablet,  Refills: 0    Associated Diagnoses: Partial seizure (H)         CONTINUE these medications which have NOT CHANGED    Details   aspirin (ASA) 81 MG tablet Take 1 tablet (81 mg) by mouth daily    Associated Diagnoses: History of TIA (transient ischemic attack) and stroke      Cyanocobalamin (B-12) 1000 MCG SUBL Place 1 tablet under the tongue daily    Associated Diagnoses: Neuropathy      LORazepam (ATIVAN) 0.5 MG tablet Take 1 tablet (0.5 mg) by mouth daily as needed for anxiety  Qty: 30 tablet, Refills: 0    Associated Diagnoses: Anxiety      pravastatin (PRAVACHOL) 80 MG tablet Take 1 tablet by mouth. at bedtime.  Qty: 90 tablet, Refills: 3    Comments: Update your computer.  Approval for  future refills when needed. Pt doesn't require refill at this time  Associated Diagnoses: Hyperlipidemia LDL goal <100      vitamin D3 (CHOLECALCIFEROL) 2000 units (50 mcg) tablet 1 tab  daily  Qty: 90 tablet, Refills: 3    Comments: Profile only. Pt doesn't require refill at this time  Associated Diagnoses: Vitamin D deficiency disease           Allergies   Allergies   Allergen Reactions     Lipitor [Atorvastatin Calcium]      Myalgias     Oxycodone Nausea and Vomiting     Rosuvastatin Calcium      muscle achiness     Simvastatin      muscle achiness

## 2023-06-01 NOTE — PLAN OF CARE
Patient's After Visit Summary was reviewed with patient and/or family.   Patient verbalized understanding of After Visit Summary, recommended follow up and was given an opportunity to ask questions.   Discharge medications sent home with patient/family: No   Discharged with son    OBSERVATION patient END time: 5877

## 2023-06-01 NOTE — CARE PLAN
"Shift from 1500 2300     Inpatient Progress Note:  For complete assessment see flow sheet documentation.     Orientation: A&O X 4.  Neuros: Intact.  Pain status:Denies pain.  Activity: SBA AX1.  Resp: WDL  Cardiac: WDL Per Tele NSR 70 S.  GI: WDL  : WDL  LDA: PIV  Infusions: SL  Diet: Regular diet  Pertinent Labs:   Safety: bed on alarm.  Consults:   Discharge Plan:Possible discharge tomorrow.  /76 (BP Location: Right arm)   Pulse 64   Temp 98.2  F (36.8  C) (Oral)   Resp 18   Ht 1.702 m (5' 7\")   Wt 70.7 kg (155 lb 12.8 oz)   LMP  (LMP Unknown)   SpO2 94%   BMI 24.40 kg/m    "

## 2023-06-01 NOTE — PHARMACY-CONSULT NOTE
Pharmacy Consult to evaluate for medication related stroke core measures    Liana Giraldo, 83 year old female admitted for transient sensory disturbance of her left hand on 5/31/2023.    Thrombolytic was not given because of Clinical contraindications    VTE Prophylaxis SCDs /PCDs placed on 5/31, as appropriate prior to end of hospital day 2.    Antithrombotic: aspirin started on 5/31/2023, as appropriate by end of hospital day 2. Continue antithrombotic therapy on discharge to meet quality measures, unless contraindicated.    Anticoagulation if history of A-fib/flutter: Patient does not have history of A-fib/flutter - anticoagulation not required for medication related stroke core measures.     LDL Cholesterol Calculated   Date Value Ref Range Status   06/01/2023 199 (H) <=100 mg/dL Final   10/20/2020 107 (H) <100 mg/dL Final     Comment:     Above desirable:  100-129 mg/dl  Borderline High:  130-159 mg/dL  High:             160-189 mg/dL  Very high:       >189 mg/dl         Patient's home statin, Pravachol (pravastatin) restarted; continue statin on discharge to meet quality measures, unless contraindicated.     Recommendations: None at this time    Thank you for the consult.    Faby Vernon RPH 6/1/2023 1:16 PM

## 2023-06-01 NOTE — CONSULTS
06/01/23 0856 Kelly Marcus, RYNE     Stroke Education Note     The following information has been reviewed with the patient:     1. Warning signs of stroke     2. Calling 911 if having warning signs of stroke     3. All modifiable risk factors: hypertension, CAD, atrial fib, diabetes, hypercholesterolemia, smoking, substance abuse, diet, physical inactivity, obesity, sleep apnea.     4. Patient's risk factors for stroke which include: sleep apnea, physical inactivity, HLD, diet,      5. Follow-up plan for after discharge     6. Discharge medications which include:STATIN     In addition, the above information was given to the patient in writing as a part of the [facility] Stroke Class Handout.     Learner's response to risk factors / lifestyle modification education: Desire to change Ability to change Reasons to change Need to change Committment to change      Kelly Marcus RN,

## 2023-06-01 NOTE — CONSULTS
Care Management Discharge Note    Discharge Date: 06/01/23     Discharge Disposition:  Home    Discharge Services:  None anticipated    Discharge Transportation:  Family or friend will provide    Private pay costs discussed: Not applicable    Does the patient's insurance plan have a 3 day qualifying hospital stay waiver?  No    Handoff Referral Completed: No    Additional Information:  SW consulted for discharge planning. Patient was admitted for acute CVA, seizure rule out. Therapy consults cancelled. Neuro and neurosurgery consulted. Pt lives at home independently. Anticipated discharge to home today. No CC/SW needs identified this admission. SW will remain available for any further needs.     SHASHANK Barrera, [facility]   Inpatient Care Coordination  [facility]   [phone]

## 2023-06-02 ENCOUNTER — PATIENT OUTREACH (OUTPATIENT)
Dept: NURSING | Facility: CLINIC | Age: 83
End: 2023-06-02
Payer: COMMERCIAL

## 2023-06-02 ASSESSMENT — ACTIVITIES OF DAILY LIVING (ADL): DEPENDENT_IADLS:: INDEPENDENT

## 2023-06-02 NOTE — LETTER
[facility] CARE COORDINATION  [address] [facility] [address]    June 2, 2023    Liana Giraldo  [address] [facility] [address]      Dear Liana,    I am a  clinic care coordinator who works with Dariel Weber MD with the [facility]. I wanted to thank you for spending the time to talk with me.  Below is a description of clinic care coordination and how I can further assist you.       The clinic care coordination team is made up of a registered nurse, , financial resource worker and community health worker who understand the health care system. The goal of clinic care coordination is to help you manage your health and improve access to the health care system. Our team works alongside your provider to assist you in determining your health and social needs. We can help you obtain health care and community resources, providing you with necessary information and education. We can work with you through any barriers and develop a care plan that helps coordinate and strengthen the communication between you and your care team.  Our services are voluntary and are offered without charge to you personally.    Please feel free to contact me with any questions or concerns regarding care coordination and what we can offer.      We are focused on providing you with the highest-quality healthcare experience possible.    Sincerely,      Danelle Darnell RN, BSN, PHN  Primary Care / Care Coordinator   [facility]  E-mail [email or web address]   [phone]

## 2023-06-02 NOTE — PROGRESS NOTES
"Clinic Care Coordination Contact    Clinic Care Coordination Contact  OUTREACH with Post Discharge Assessment    Referral Information:  Referral Source: IP Handoff    Primary Diagnosis: Other (include Comment box) (Partial seizure, CVA, Anxiety)    Chief Complaint   Patient presents with     Clinic Care Coordination - Initial      Universal Utilization:   [facility]  Clinic Utilization:  [facility]  Difficulty keeping appointments:: No  Compliance Concerns: No  No-Show Concerns: No  Utilization    Hospital Admissions  1             ED Visits  1             No Show Count (past year)  0                Current as of: 6/1/2023  5:49 PM            Clinical Concerns:  Current Medical Concerns:  Recent discharge from hospital    Current Behavioral Concerns: Anxiety    Education Provided to patient:   RNCC called and spoke with patient/caregiver; introduced self, discussed role of Care Coordination and explained reason for call      Health Maintenance Reviewed: Due/Overdue   Health Maintenance Due   Topic Date Due     COVID-19 Vaccine (5 - Pfizer series) 05/10/2023     ZOSTER IMMUNIZATION (3 of 3) 05/19/2023     Clinical Pathway: None    Admission:    Admission Date: 05/31/23   Reason for Admission: Partial seizure, CVA, Anxiety  Discharge:   Discharge Date: 06/01/23  Discharge Diagnosis: Partial seizure, CVA, Anxiety    Discharge Assessment  How are you doing now that you are home?: \"I slept really good last night\"  How are your symptoms? (Red Flag symptoms escalate to triage hotline per guidelines): Improved  Do you feel your condition is stable enough to be safe at home until your provider visit?: Yes  Does the patient have their discharge instructions? : Yes  Does the patient have questions regarding their discharge instructions? : No  Were you started on any new medications or were there changes to any of your previous medications? : Yes  Does the patient have all of their " medications?: Yes  Do you have questions regarding any of your medications? : No  Do you have all of your needed medical supplies or equipment (DME)?  (i.e. oxygen tank, CPAP, cane, etc.): Yes  Discharge follow-up appointment scheduled within 14 calendar days? : No  Is patient agreeable to assistance with scheduling? : Yes (Patient verbalized she will call to schedule PCP/hospital follow up visit)    Post-op (Clinicians Only)  Did the patient have surgery or a procedure: No  Fever: No  Chills: No  Eating & Drinking: eating and drinking without complaints/concerns  PO Intake: regular diet  Additional Symptoms:  (Patient denies)  Bowel Function: normal  Date of last BM: 05/31/23  Urinary Status: voiding without complaint/concerns    Medication Management:  Medication review status: Medications reviewed and no changes reported per patient.        Current Outpatient Medications   Medication     aspirin (ASA) 81 MG tablet     Cyanocobalamin (B-12) 1000 MCG SUBL     levETIRAcetam (KEPPRA) 500 MG tablet     LORazepam (ATIVAN) 0.5 MG tablet     pravastatin (PRAVACHOL) 80 MG tablet     vitamin D3 (CHOLECALCIFEROL) 2000 units (50 mcg) tablet     No current facility-administered medications for this visit.     Functional Status:  Dependent ADLs:: Independent  Dependent IADLs:: Independent  Bed or wheelchair confined:: No  Mobility Status: Independent    Living Situation:  Current living arrangement:: I live alone  Type of residence:: Private home - stairs    Lifestyle & Psychosocial Needs:    Social Determinants of Health     Tobacco Use: Low Risk  (5/24/2023)    Patient History      Smoking Tobacco Use: Never      Smokeless Tobacco Use: Never      Passive Exposure: Not on file   Alcohol Use: Not on file   Financial Resource Strain: Not on file   Food Insecurity: Not on file   Transportation Needs: Not on file   Physical Activity: Not on file   Stress: Not on file   Social Connections: Not on file   Intimate Partner Violence:  Not on file   Depression: Not at risk (3/17/2023)    PHQ-2      PHQ-2 Score: 0   Housing Stability: Not on file     Diet:: Regular  Inadequate nutrition (GOAL):: No  Tube Feeding: No  Inadequate activity/exercise (GOAL):: No  Significant changes in sleep pattern (GOAL): No  Transportation means:: Regular car     Sikh or spiritual beliefs that impact treatment:: No  Mental health DX:: Yes  Mental health DX how managed:: Medication  Mental health management concern (GOAL):: No  Chemical Dependency Status: No Current Concerns  Chemical Dependency Management:  (NA)  Informal Support system:: Children, Family, Friends      Resources and Interventions:  Current Resources:   Community Resources: None  Supplies Currently Used at Home: None  Equipment Currently Used at Home: other (see comments), none (Not currently using but has walker from brother)  Employment Status: retired     Advance Care Plan/Directive  Advanced Care Plans/Directives on file:: Yes  Type Advanced Care Plans/Directives:  (Full code)    Referrals Placed: None     Care Plan: NA     Plan:   -Patient will contact the care team with questions, concerns, support needs   -Patient will use the clinic as a resource and understands (s)he can contact the [facility] with 24/7 after hours services available  -Care Coordinator will remain available as needed  -No further care coordination outreaches at this time     Danelle Darnell RN, BSN, PHN  Primary Care / Care Coordinator   [facility]  E-mail [email or web address]   [phone]

## 2023-06-05 LAB
BACTERIA BLD CULT: NO GROWTH
BACTERIA BLD CULT: NO GROWTH

## 2023-06-06 ENCOUNTER — MYC MEDICAL ADVICE (OUTPATIENT)
Dept: INTERNAL MEDICINE | Facility: CLINIC | Age: 83
End: 2023-06-06

## 2023-06-16 ENCOUNTER — NURSE TRIAGE (OUTPATIENT)
Dept: INTERNAL MEDICINE | Facility: CLINIC | Age: 83
End: 2023-06-16
Payer: COMMERCIAL

## 2023-06-16 NOTE — TELEPHONE ENCOUNTER
Pt called about hospital follow up (5/31-6/1)    1.)Pt wondering if she can schedule a hospital f/u in 1-2 wks; routing to provider please indicate if ok to schedule in virtual or same day slot? In person preferred, telephone visit may be an option.   2.) repeat MRI - please review pt instructions/advise   3.) Keppra - pt was prescribed this med while in the hospital. She is not sure if she should keep taking this. Her appointment with neurology isn't until 7/17 and she will need a refill before then. She is wondering who to talk to about refill?     Pt also discussed anxiety regarding managing care at home, refused traige. Pt alert, calm, orientated x 4 at time of call. Pt stating that she lives alone and has been feeling overwhelmed with having to manage a cardiac monitor at home by herself. Pt needing reinforcement for follow up instructions, clarifying why they were ordered by the hospital doctor.     Pt agrees to follow up with her provider regarding results of cardiac monitor. Pt declined anxiety information/resources at this time but agrees to notify her doctor if symptoms worsen and will also discuss questions with her provider at her next appointment.       Keppra instructions reviewed. Pt wondering if she should continue to take  this beyond 30 days - started taking during hospitalization and will need a refill prior to next scheduled appointment on 7/17 with neurology; routing to PCP for review/recommendations.     Reason for Disposition    Information only question and nurse able to answer    Additional Information    Negative: Nursing judgment    Negative: Nursing judgment    Protocols used: INFORMATION ONLY CALL - NO TRIAGE-A-[place]

## 2023-06-16 NOTE — TELEPHONE ENCOUNTER
Pt would a hosp follow up appointment. The first opening is 7/18, pt would like to be seen sooner. She would prefer to be seen in person but is ok to try a telephone visit.     Please indicate if ok to use a same day or virtual in the next 1-2 weeks.     July 17th neurology appointment is outside of [facility].

## 2023-06-16 NOTE — TELEPHONE ENCOUNTER
Reason for Disposition    Information only question and nurse able to answer    Additional Information    Negative: Nursing judgment    Negative: Nursing judgment    Protocols used: INFORMATION ONLY CALL - NO TRIAGE-A-[place]

## 2023-07-24 ENCOUNTER — OFFICE VISIT (OUTPATIENT)
Dept: INTERNAL MEDICINE | Facility: CLINIC | Age: 83
End: 2023-07-24
Payer: COMMERCIAL

## 2023-07-24 VITALS
OXYGEN SATURATION: 98 % | SYSTOLIC BLOOD PRESSURE: 115 MMHG | BODY MASS INDEX: 25.1 KG/M2 | HEIGHT: 67 IN | WEIGHT: 159.9 LBS | HEART RATE: 65 BPM | DIASTOLIC BLOOD PRESSURE: 76 MMHG | TEMPERATURE: 97.2 F

## 2023-07-24 DIAGNOSIS — R56.9 PARTIAL SEIZURE (H): Primary | ICD-10-CM

## 2023-07-24 DIAGNOSIS — K59.00 CONSTIPATION, UNSPECIFIED CONSTIPATION TYPE: ICD-10-CM

## 2023-07-24 DIAGNOSIS — I63.9 ACUTE CVA (CEREBROVASCULAR ACCIDENT) (H): ICD-10-CM

## 2023-07-24 DIAGNOSIS — E78.5 HYPERLIPIDEMIA LDL GOAL <70: ICD-10-CM

## 2023-07-24 DIAGNOSIS — I60.9 SUBARACHNOID HEMORRHAGE (H): ICD-10-CM

## 2023-07-24 LAB
CHOLEST SERPL-MCNC: 186 MG/DL
HDLC SERPL-MCNC: 61 MG/DL
LDLC SERPL CALC-MCNC: 102 MG/DL
NONHDLC SERPL-MCNC: 125 MG/DL
TRIGL SERPL-MCNC: 115 MG/DL

## 2023-07-24 PROCEDURE — 99214 OFFICE O/P EST MOD 30 MIN: CPT | Performed by: INTERNAL MEDICINE

## 2023-07-24 PROCEDURE — 36415 COLL VENOUS BLD VENIPUNCTURE: CPT | Performed by: INTERNAL MEDICINE

## 2023-07-24 PROCEDURE — 80061 LIPID PANEL: CPT | Performed by: INTERNAL MEDICINE

## 2023-07-24 NOTE — PROGRESS NOTES
ASSESSMENT:    1. Partial seizure (H)  No recurrent activity with use of Keppra.  Continue medical management per neurology    2. Subarachnoid hemorrhage (H)  Small.  Likely related to seizure activity.  Normal MRA [place] and carotid arteries.  Future MRI as ordered by neurology in August    3. Acute CVA (cerebrovascular accident) (H)  No residual deficit currently visible.  Area of stroke consistent with previous arm movement issues.  Continue medical management    4. Constipation, unspecified constipation type  Secondary Keppra.  Was asymptomatic prior to medication start.  Will star MiraLAX    5. Hyperlipidemia LDL goal <70  LDL goal less than 70 with history of stroke.  On pravastatin.  Lab today as ordered. Has been intolerant of three other statin trials and previous failed to respond to rtral of Zetia  - Lipid panel reflex to direct LDL Non-fasting; Future  - Lipid panel reflex to direct LDL Non-fasting         PLAN:   Miralax 17mg every other day with glass of water or other liquids to soften stools. If not helping constipation after 4-5 days, then increase to every day. Get over the counter  Continue other medications  MRI brain as scheduled in early August  through Neurology  Follow-up with Neurology per Dr Koroma recommendations   Labs today re: cholesterol      (Chart documentation was completed, in part, with [employer] voice-recognition software. Even though reviewed, some grammatical, spelling, and word errors may remain.)    Dariel Weber MD  Internal Medicine Department  [facility]OB[facility] is a 83 year old, presenting for the following health issues:  Hospital F/U       HPI       Hospital Follow-up Visit:    Hospital/Nursing Home/IP Rehab Facility: [facility]  Date of Admission: 531/2023  Date of Discharge: 6/1/2023  Reason(s) for Admission: Partial Seizure     Was your hospitalization related to COVID-19? No  "  Problems taking medications regularly:  None  Medication changes since discharge: None  Problems adhering to non-medication therapy:  None    Summary of hospitalization:  [facility] discharge summary reviewed  Diagnostic Tests/Treatments reviewed.  Follow up needed:  future repeat MRI brain  Other Healthcare Providers Involved in Patient s Care:         Neurology  Update since discharge: improved.         Plan of care communicated with patient     Discharge summary reviewed. Part of the summary as below:      [facility]  Hospitalist Discharge Summary       Date of Admission:  5/31/2023  Date of Discharge:  6/1/2023  Discharging Provider: Miladis Barahona PA-C  Discharge Service: Hospitalist Service        Discharge Diagnoses  Left arm discoordination, possible seizure  CVA         Clinically Significant Risk Factors           Follow-ups Needed After Discharge  Follow-up Appointments     Follow-up and recommended labs and tests       Follow-up with primary care in the next 1 to 2 weeks.  You will need a   repeat MRI brain  Follow up with neurology for possible seizure                     Unresulted Labs Ordered in the Past 30 Days of this Admission      Date and Time Order Name Status Description     5/31/2023  6:48 AM Blood Culture Arm, Left Preliminary       5/31/2023  6:48 AM Blood Culture Hand, Right Preliminary         These results will be followed up by primary care provider           Discharge Disposition   Discharged to home  Condition at discharge: Stable           Hospital Course  Liana Giraldo is a 83 year old female with PMhx of HLD, TIA (2016), anxiety, who was admitted on 5/31/2023 after presenting with transient sensory disturbance of her left hand.      \"Ms. Giraldo first noticed development of a numbness in her fingertips up to her elbow about 1 week ago.  Not painful.  Episodes lasting about 10 minutes.  She would try to move her hands to see if the " "numbness would resolve with time.  She was evaluated last week by her primary at [facility] when this first happened and they recommended follow-up with a neurologist and was referred to the ED although she was not evaluated.  Has been having ongoing episodes since then not associated with other symptoms or LOC. Last night due to persistent symptoms episode lasting longer about 20 minutes presented to the emergency department for evaluation.  Has had no loss of consciousness.  No pain in her left upper extremity or neck.  Episodes are not painful. She has also described as her hand \"doesn't work right\". She reports mild discomfort with turning her neck laterally that has been going on for several months as well as shoulder discomfort.   Denies any history of prior neurologic problems or stroke.  She has never been on medications to treat seizure.  She has had no headache.  No weakness in her legs.  She is at baseline ambulatory and does not use assistive devices.  She denies any history of neuropathy.  No recent medication changes.  She has had no fever chest pain or shortness of breath.  She reports fatigue with this has been going on for months\"     In the ED she was afebrile with pulse 66, pressure 153/82 and breathing comfortably on room air without hypoxia. Labs unremarkable, INR normal. MRI brain with  7 mm acute/early subacute infarct in the anterior left frontal lobe without mass effect or evidence of associated hemorrhage. Nonspecific abnormal sulcal FLAIR signal and leptomeningeal enhancement within the right precentral sulcus, undetermined. Per Radiology - Absence of abnormal blooming on the GRE sequence would make focal acute subarachnoid  hemorrhage less likely although the possibility of hyperacute hemorrhage is considered. Infection is also considered less likely given the relative focal nature of the finding as well as absence of diffusion signal. Recommend a noncontrast head CT for   further " assessment. The possibility of focal subarachnoid hemorrhage in the setting of reversible cerebral vasoconstriction syndrome is considered. Underlying mild to moderate volume loss and presumed chronic small vessel ischemic changes.   MRI was followed by noncontrast head CT which showed a subacute SAH in the right precentral sulcus.      Vascular neurology was consulted who did not think the presentation of subarachnoid hemorrhage was concerning and recommended no further follow-up.  Stroke neurology consulted who believes the stroke was embolic in nature and recommend starting aspirin 81 mg daily, continue with pravastatin 80 mg daily and having a cardiac event monitor placed.  Ideally blood pressure would be less than 140/90 and A1c goal of less than 7.  They were concerned that her left arm discoordination was related to seizure so neurology was consulted and started her on Keppra.  EEG was performed and read as normal but they would like her to continue on Keppra at this time.     She should follow-up with repeat MRI brain in 1 to 2 weeks, primary care in 1 to 2 weeks and neurology for follow-up of MRI and possible seizure disorder.  At the time of discharge she was having no left arm symptoms                Most recent lab results reviewed with pt.      Patient for neurology 7/13/2023.  Note reviewed.  Will be having future repeat MRI brain to follow-up on small SAH and stroke.  On Keppra.  Denies current movement or other seizure activity since starting medication.  Mild constipation with medication.  Denies blood in stools or abdominal pain.  No chest pain or shortness of breath.  Denies current headache or vision changes.  Denies extremity weakness.  Previous cardiac heart monitor only shows normal sinus rhythm.  No A-fib as cause for stroke.  On pravastatin for hyperlipidemia.  Blood pressure at goal       Additional ROS:   Constitutional, HEENT, Cardiovascular, Pulmonary, GI and , Neuro, MSK and Psych  "review of systems/symptoms are otherwise negative or unchanged from previous, except as noted above.      OBJECTIVE:  /76   Pulse 65   Temp 97.2  F (36.2  C) (Temporal)   Ht 1.702 m (5' 7\")   Wt 72.5 kg (159 lb 14.4 oz)   LMP  (LMP Unknown)   SpO2 98%   BMI 25.04 kg/m     Estimated body mass index is 25.04 kg/m  as calculated from the following:    Height as of this encounter: 1.702 m (5' 7\").    Weight as of this encounter: 72.5 kg (159 lb 14.4 oz).  Eye: PERRL, EOMI  HENT: ear canals and TM's normal and nose and mouth without ulcers or lesions   Neck: no adenopathy. Thyroid normal to palpation. No bruits  Pulm: Lungs clear to auscultation   CV: Regular rates and rhythm  GI: Soft, nontender, Normal active bowel sounds, No hepatosplenomegaly or masses palpable  Ext: Peripheral pulses intact. No edema.  Neuro: Normal strength and tone, sensory exam grossly normal. Stable gait currently. Coordination OK in extremities. No tremor              "

## 2023-07-24 NOTE — PATIENT INSTRUCTIONS
PLAN:    Miralax 17mg every other day with glass of water or other liquids to soften stools. If not helping constipation after 4-5 days, then increase to every day. Get over the counter  Continue other medications  MRI brain as scheduled in early August  through Neurology  Follow-up with Neurology per Dr Koroma recommendations   Labs today re: cholesterol

## 2023-11-21 ENCOUNTER — ANCILLARY PROCEDURE (OUTPATIENT)
Dept: MAMMOGRAPHY | Facility: CLINIC | Age: 83
End: 2023-11-21
Attending: INTERNAL MEDICINE
Payer: COMMERCIAL

## 2023-11-21 DIAGNOSIS — Z12.31 ENCOUNTER FOR SCREENING MAMMOGRAM FOR BREAST CANCER: ICD-10-CM

## 2023-11-21 PROCEDURE — 77067 SCR MAMMO BI INCL CAD: CPT | Mod: TC | Performed by: RADIOLOGY

## 2023-11-21 PROCEDURE — 77063 BREAST TOMOSYNTHESIS BI: CPT | Mod: TC | Performed by: RADIOLOGY

## 2023-11-30 ENCOUNTER — NURSE TRIAGE (OUTPATIENT)
Dept: INTERNAL MEDICINE | Facility: CLINIC | Age: 83
End: 2023-11-30
Payer: COMMERCIAL

## 2023-11-30 NOTE — TELEPHONE ENCOUNTER
Called and spoke with pt to relay provider message below.   Pt verbalized understanding and states she will call the provided number today to get scheduled.     Thank you,  Jael Rahman RN

## 2023-11-30 NOTE — TELEPHONE ENCOUNTER
If not painful, may be dealing with vaginal prolapse rather than a cyst. Either way, would be most appropriate for pt to see GYN for management.   Schedule an appointment with [facility].  Pt to call [phone] to schedule the  appointment

## 2023-11-30 NOTE — TELEPHONE ENCOUNTER
"Nurse Triage SBAR    Is this a 2nd Level Triage? YES, LICENSED PRACTITIONER REVIEW IS REQUIRED    Situation: Patient calling regarding possible vaginal cyst.     Background: Patient first noticed possible cyst a few weeks ago, seems to be getting bigger in size.     Assessment: Patient noticing a \"big bubble\" inside vagina. Not painful. No itching. No vaginal bleeding. No fever. No pain with urination. Denies any injury to the area. Not sexually active. No foreign body.     Protocol Recommended Disposition:   See in Office Within 3 Days    Recommendation: Disposition is for visit within 3 days. RN is inquiring if referral to OB/GYN would be more appropriate. Please review and advise.      Routed to provider    Does the patient meet one of the following criteria for ADS visit consideration? 16+ years old, with an [facility] PCP     TIP  Providers, please consider if this condition is appropriate for management at one of our Acute and Diagnostic Services sites.     If patient is a good candidate, please use dotphrase <dot>triageresponse and select Refer to ADS to document.      Reason for Disposition   Patient wants to be seen    Additional Information   Negative: Patient is worried they have a sexually transmitted infection (STI)   Negative: Vaginal odor (bad smell) not improved > 3 days following Care Advice   Negative: Symptoms of a yeast infection (i.e., itchy, white discharge, not bad smelling) and not improved > 3 days following Care Advice   Negative: Vaginal itching and not improved > 3 days following Care Advice   Negative: MODERATE-SEVERE itching (i.e., interferes with school, work, or sleep)   Negative: Rash (e.g., redness, tiny bumps, sore) of genital area and present > 24 hours   Negative: Tender lump (swelling or \"ball\") at vaginal opening   Negative: SEVERE pain and not improved 2 hours after pain medicine   Negative: Genital area looks infected (e.g., draining sore, spreading redness) and fever   Negative: " Something is hanging out of the vagina and can't easily be pushed back inside   Negative: MILD-MODERATE pain and present > 24 hours  (Exception: Chronic pain.)   Negative: Genital area looks infected (e.g., draining sore, spreading redness)   Negative: Rash with painful tiny water blisters   Negative: Patient sounds very sick or weak to the triager   Negative: Sounds like a life-threatening emergency to the triager   Negative: Followed a genital area injury (e.g., vagina, vulva)   Negative: Vaginal bleeding is main symptom   Negative: Vaginal discharge is main symptom   Negative: Pain or burning with passing urine (urination) is main symptom   Negative: Menstrual cramps is main symptom   Negative: Abdomen pain is main symptom   Negative: Pubic lice suspected   Negative: Itching or rash of female genital area (e.g., labia, vagina, vulva)   Negative: Pregnant and labor suspected    Protocols used: Vaginal Symptoms-A-OH    Jv KULKARNI RN 11/30/2023 at 3:43 PM

## 2024-01-29 DIAGNOSIS — E78.5 HYPERLIPIDEMIA LDL GOAL <100: ICD-10-CM

## 2024-01-29 RX ORDER — PRAVASTATIN SODIUM 80 MG/1
TABLET ORAL
Qty: 90 TABLET | Refills: 1 | Status: SHIPPED | OUTPATIENT
Start: 2024-01-29

## 2024-02-13 ENCOUNTER — OFFICE VISIT (OUTPATIENT)
Dept: URGENT CARE | Facility: URGENT CARE | Age: 84
End: 2024-02-13
Payer: COMMERCIAL

## 2024-02-13 VITALS
WEIGHT: 160 LBS | SYSTOLIC BLOOD PRESSURE: 133 MMHG | HEART RATE: 60 BPM | OXYGEN SATURATION: 97 % | BODY MASS INDEX: 25.06 KG/M2 | TEMPERATURE: 97.6 F | DIASTOLIC BLOOD PRESSURE: 74 MMHG

## 2024-02-13 DIAGNOSIS — N39.0 URINARY TRACT INFECTION WITH HEMATURIA, SITE UNSPECIFIED: Primary | ICD-10-CM

## 2024-02-13 DIAGNOSIS — R31.9 URINARY TRACT INFECTION WITH HEMATURIA, SITE UNSPECIFIED: Primary | ICD-10-CM

## 2024-02-13 DIAGNOSIS — R35.0 URINE FREQUENCY: ICD-10-CM

## 2024-02-13 LAB
ALBUMIN UR-MCNC: 30 MG/DL
APPEARANCE UR: ABNORMAL
BACTERIA #/AREA URNS HPF: ABNORMAL /HPF
BILIRUB UR QL STRIP: NEGATIVE
COLOR UR AUTO: YELLOW
GLUCOSE UR STRIP-MCNC: NEGATIVE MG/DL
HGB UR QL STRIP: ABNORMAL
KETONES UR STRIP-MCNC: NEGATIVE MG/DL
LEUKOCYTE ESTERASE UR QL STRIP: ABNORMAL
NITRATE UR QL: NEGATIVE
PH UR STRIP: 5.5 [PH] (ref 5–7)
RBC #/AREA URNS AUTO: ABNORMAL /HPF
SP GR UR STRIP: 1.02 (ref 1–1.03)
SQUAMOUS #/AREA URNS AUTO: ABNORMAL /LPF
UROBILINOGEN UR STRIP-ACNC: 0.2 E.U./DL
WBC #/AREA URNS AUTO: >100 /HPF

## 2024-02-13 PROCEDURE — 99213 OFFICE O/P EST LOW 20 MIN: CPT | Performed by: PHYSICIAN ASSISTANT

## 2024-02-13 PROCEDURE — 81001 URINALYSIS AUTO W/SCOPE: CPT | Performed by: PHYSICIAN ASSISTANT

## 2024-02-13 PROCEDURE — 87086 URINE CULTURE/COLONY COUNT: CPT | Performed by: PHYSICIAN ASSISTANT

## 2024-02-13 RX ORDER — CEPHALEXIN 500 MG/1
500 CAPSULE ORAL 2 TIMES DAILY
Qty: 14 CAPSULE | Refills: 0 | Status: SHIPPED | OUTPATIENT
Start: 2024-02-13 | End: 2024-02-20

## 2024-02-13 NOTE — PROGRESS NOTES
SUBJECTIVE:   Liana Giraldo is a 84 year old female who  presents today for a possible UTI. Symptoms of dysuria, urgency, frequency, burning, and hesitancy have been going on for 1week(s).  Hematuria no.  gradual onsetand mild.  There is no history of fever, chills, nausea or vomiting.  No history of vaginal or penile discharge. This patient does have a history of urinary tract infections. 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    Osteopenia     Other and unspecified hyperlipidemia     Personal history of colonic polyps 8/05    Hyperplastic    Rotator cuff (capsule) sprain 12/01     right with 3mm supraspinatous tendon tear    Synovial cyst of popliteal space 7/30/03    right    TIA (transient ischemic attack) 2/25/16    Vitamin D deficiency disease 2/23/2013     Current Outpatient Medications   Medication Sig Dispense Refill    aspirin (ASA) 81 MG tablet Take 1 tablet (81 mg) by mouth daily      cephALEXin (KEFLEX) 500 MG capsule Take 1 capsule (500 mg) by mouth 2 times daily for 7 days 14 capsule 0    pravastatin (PRAVACHOL) 80 MG tablet Take 1 tablet by mouth. at bedtime. 90 tablet 1    vitamin D3 (CHOLECALCIFEROL) 2000 units (50 mcg) tablet 1 tab  daily 90 tablet 3    levETIRAcetam (KEPPRA) 500 MG tablet Take 1 tablet (500 mg) by mouth 2 times daily for 30 days 60 tablet 0    LORazepam (ATIVAN) 0.5 MG tablet Take 1 tablet (0.5 mg) by mouth daily as needed for anxiety (Patient not taking: Reported on 2/13/2024) 30 tablet 0     Social History     Tobacco Use    Smoking status: Never     Smokeless tobacco: Never   Substance Use Topics    Alcohol use: Yes     Comment: occ       ROS:   Review of systems negative except as stated above.    OBJECTIVE:  /74 (BP Location: Right arm, Patient Position: Sitting, Cuff Size: Adult Regular)   Pulse 60   Temp 97.6  F (36.4  C) (Oral)   Wt 72.6 kg (160 lb)   LMP  (LMP Unknown)   SpO2 97%   BMI 25.06 kg/m    GENERAL APPEARANCE: healthy, alert and no distress  RESP: lungs clear to auscultation - no rales, rhonchi or wheezes  CV: regular rates and rhythm, normal S1 S2, no murmur noted  BACK: No CVA tenderness  SKIN: no suspicious lesions or rashes      Results for orders placed or performed in visit on 02/13/24   UA Macroscopic with reflex to Microscopic and Culture - Clinic Collect     Status: Abnormal    Specimen: Urine, Clean Catch   Result Value Ref Range    Color Urine Yellow Colorless, Straw, Light Yellow, Yellow    Appearance Urine Cloudy (A) Clear    Glucose Urine Negative Negative mg/dL    Bilirubin Urine Negative Negative    Ketones Urine Negative Negative mg/dL    Specific Gravity Urine 1.025 1.003 - 1.035    Blood Urine Moderate (A) Negative    pH Urine 5.5 5.0 - 7.0    Protein Albumin Urine 30 (A) Negative mg/dL    Urobilinogen Urine 0.2 0.2, 1.0 E.U./dL    Nitrite Urine Negative Negative    Leukocyte Esterase Urine Large (A) Negative   Urine Microscopic Exam     Status: Abnormal   Result Value Ref Range    Bacteria Urine Many (A) None Seen /HPF    RBC Urine 10-25 (A) 0-2 /HPF /HPF    WBC Urine >100 (A) 0-5 /HPF /HPF    Squamous Epithelials Urine Many (A) None Seen /LPF       ASSESSMENT:   (N39.0,  R31.9) Urinary tract infection with hematuria, site unspecified  (primary encounter diagnosis)  Comment: will use medication from last uti 2 years ago as it was effective at the time  Plan: cephALEXin (KEFLEX) 500 MG capsule           (R35.0) Urine frequency  Plan: UA Macroscopic with reflex to Microscopic and         Culture - Clinic Collect,  Urine Microscopic         Exam, Urine Culture         Red flags and emergent follow up discussed, and understood by patient  Follow up with PCP if symptoms worsen or fail to improve

## 2024-02-14 LAB — BACTERIA UR CULT: NORMAL

## 2024-02-16 ENCOUNTER — PATIENT OUTREACH (OUTPATIENT)
Dept: CARE COORDINATION | Facility: CLINIC | Age: 84
End: 2024-02-16
Payer: COMMERCIAL

## 2024-03-01 ENCOUNTER — PATIENT OUTREACH (OUTPATIENT)
Dept: CARE COORDINATION | Facility: CLINIC | Age: 84
End: 2024-03-01
Payer: COMMERCIAL

## 2024-04-04 ENCOUNTER — TRANSFERRED RECORDS (OUTPATIENT)
Dept: HEALTH INFORMATION MANAGEMENT | Facility: CLINIC | Age: 84
End: 2024-04-04
Payer: COMMERCIAL

## 2024-05-27 NOTE — TELEPHONE ENCOUNTER
Patient Quality Outreach      Summary:    Patient has the following on her problem list/HM:   IVD     ASA: Passed    Last LDL:    Lab Results   Component Value Date    CHOL 191 10/20/2020     Lab Results   Component Value Date    HDL 65 10/20/2020     Lab Results   Component Value Date     10/20/2020     Lab Results   Component Value Date    TRIG 97 10/20/2020        Lab Results   Component Value Date    CHOLHDLRATIO 3.0 02/23/2015        Is the patient on a Statin? Yes   Is the patient on Aspirin? Yes                  Medications     HMG CoA Reductase Inhibitors     pravastatin (PRAVACHOL) 80 MG tablet       Salicylates     aspirin (ASA) 81 MG tablet             Last three blood pressure readings:  BP Readings from Last 3 Encounters:   09/18/21 139/71   04/07/21 120/78   02/24/21 124/80        Tobacco History:       Tobacco Use      Smoking status: Never Smoker      Smokeless tobacco: Never Used      Patient is due/failing the following:   Physical  - Due after 10/20/2021  Immunizations  -  Zoster    Type of outreach:    Sent [employer] message.    Questions for provider review:    None                                                                                                                                     Yanique Dey CMA       Chart routed to Care Team.           RN updated Dr. De Leon on US and pt fundal assessments. Order received to send pt to mom/baby

## 2024-06-01 ENCOUNTER — HEALTH MAINTENANCE LETTER (OUTPATIENT)
Age: 84
End: 2024-06-01

## 2024-07-15 ENCOUNTER — LAB (OUTPATIENT)
Dept: LAB | Facility: CLINIC | Age: 84
End: 2024-07-15
Payer: COMMERCIAL

## 2024-07-15 DIAGNOSIS — E78.5 HYPERLIPIDEMIA LDL GOAL <100: ICD-10-CM

## 2024-07-15 LAB
ALBUMIN SERPL BCG-MCNC: 4.3 G/DL (ref 3.5–5.2)
ALP SERPL-CCNC: 67 U/L (ref 40–150)
ALT SERPL W P-5'-P-CCNC: 10 U/L (ref 0–50)
ANION GAP SERPL CALCULATED.3IONS-SCNC: 11 MMOL/L (ref 7–15)
AST SERPL W P-5'-P-CCNC: 18 U/L (ref 0–45)
BILIRUB SERPL-MCNC: 0.6 MG/DL
BUN SERPL-MCNC: 17.3 MG/DL (ref 8–23)
CALCIUM SERPL-MCNC: 9 MG/DL (ref 8.8–10.2)
CHLORIDE SERPL-SCNC: 106 MMOL/L (ref 98–107)
CHOLEST SERPL-MCNC: 204 MG/DL
CREAT SERPL-MCNC: 0.85 MG/DL (ref 0.51–0.95)
EGFRCR SERPLBLD CKD-EPI 2021: 68 ML/MIN/1.73M2
FASTING STATUS PATIENT QL REPORTED: YES
FASTING STATUS PATIENT QL REPORTED: YES
GLUCOSE SERPL-MCNC: 94 MG/DL (ref 70–99)
HCO3 SERPL-SCNC: 25 MMOL/L (ref 22–29)
HDLC SERPL-MCNC: 67 MG/DL
LDLC SERPL CALC-MCNC: 118 MG/DL
NONHDLC SERPL-MCNC: 137 MG/DL
POTASSIUM SERPL-SCNC: 4.1 MMOL/L (ref 3.4–5.3)
PROT SERPL-MCNC: 7.6 G/DL (ref 6.4–8.3)
SODIUM SERPL-SCNC: 142 MMOL/L (ref 135–145)
TRIGL SERPL-MCNC: 94 MG/DL

## 2024-07-15 PROCEDURE — 36415 COLL VENOUS BLD VENIPUNCTURE: CPT

## 2024-07-15 PROCEDURE — 80061 LIPID PANEL: CPT

## 2024-07-15 PROCEDURE — 80053 COMPREHEN METABOLIC PANEL: CPT

## 2024-09-03 ENCOUNTER — MYC MEDICAL ADVICE (OUTPATIENT)
Dept: INTERNAL MEDICINE | Facility: CLINIC | Age: 84
End: 2024-09-03
Payer: COMMERCIAL

## 2024-09-04 NOTE — TELEPHONE ENCOUNTER
Dr. Weber,    Please see pt MyChart message and advise.   Suggest pt schedule an appt with you to discuss? In-person?    Thank you,  Jael Rahman RN

## 2024-09-09 ENCOUNTER — TELEPHONE (OUTPATIENT)
Dept: INTERNAL MEDICINE | Facility: CLINIC | Age: 84
End: 2024-09-09
Payer: COMMERCIAL

## 2024-09-11 ENCOUNTER — TELEPHONE (OUTPATIENT)
Dept: INTERNAL MEDICINE | Facility: CLINIC | Age: 84
End: 2024-09-11
Payer: COMMERCIAL

## 2024-09-11 DIAGNOSIS — E55.9 VITAMIN D DEFICIENCY: ICD-10-CM

## 2024-09-11 DIAGNOSIS — R53.83 OTHER FATIGUE: Primary | ICD-10-CM

## 2024-09-11 DIAGNOSIS — R56.9 PARTIAL SEIZURE (H): ICD-10-CM

## 2024-09-11 NOTE — TELEPHONE ENCOUNTER
Reason for Call:  Appointment Request    Patient requesting this type of appt:  patient called and needs an appt with Tia Perez at  [facility]     Patient needs handicap sticker for vehicle.    Wondering if neesd paperwork and needs in person?    Or a telephone call okay as no paperwork is needed    Please contact patient.  Thank you.    Requested provider: Dariel Weber    Reason patient unable to be scheduled: Needs to be scheduled by clinic    When does patient want to be seen/preferred time:  asap    Comments: callback on Friday    Could we send this information to you in [employer] or would you prefer to receive a phone call?:   Patient would prefer a phone call   Okay to leave a detailed message?: Yes at Home number on file [phone] (home)    Call taken on 9/11/2024 at 1:28 PM by Lexus Marshall

## 2024-09-11 NOTE — TELEPHONE ENCOUNTER
Tried to call pt. KRYSTA. LELA I'll call again tomorrow. Not clear if meeting criteria for permit

## 2024-09-13 ENCOUNTER — PATIENT OUTREACH (OUTPATIENT)
Dept: CARE COORDINATION | Facility: CLINIC | Age: 84
End: 2024-09-13
Payer: COMMERCIAL

## 2024-09-18 ENCOUNTER — LAB (OUTPATIENT)
Dept: LAB | Facility: CLINIC | Age: 84
End: 2024-09-18
Payer: COMMERCIAL

## 2024-09-18 DIAGNOSIS — R53.83 OTHER FATIGUE: ICD-10-CM

## 2024-09-18 DIAGNOSIS — E55.9 VITAMIN D DEFICIENCY: ICD-10-CM

## 2024-09-18 DIAGNOSIS — R56.9 PARTIAL SEIZURE (H): ICD-10-CM

## 2024-09-18 LAB
ALBUMIN SERPL BCG-MCNC: 4.1 G/DL (ref 3.5–5.2)
ALP SERPL-CCNC: 65 U/L (ref 40–150)
ALT SERPL W P-5'-P-CCNC: 9 U/L (ref 0–50)
ANION GAP SERPL CALCULATED.3IONS-SCNC: 8 MMOL/L (ref 7–15)
AST SERPL W P-5'-P-CCNC: 20 U/L (ref 0–45)
BILIRUB SERPL-MCNC: 0.3 MG/DL
BUN SERPL-MCNC: 13.2 MG/DL (ref 8–23)
CALCIUM SERPL-MCNC: 9 MG/DL (ref 8.8–10.4)
CHLORIDE SERPL-SCNC: 105 MMOL/L (ref 98–107)
CORTIS SERPL-MCNC: 8.8 UG/DL
CREAT SERPL-MCNC: 0.81 MG/DL (ref 0.51–0.95)
EGFRCR SERPLBLD CKD-EPI 2021: 72 ML/MIN/1.73M2
ERYTHROCYTE [DISTWIDTH] IN BLOOD BY AUTOMATED COUNT: 12.5 % (ref 10–15)
GLUCOSE SERPL-MCNC: 108 MG/DL (ref 70–99)
HCO3 SERPL-SCNC: 26 MMOL/L (ref 22–29)
HCT VFR BLD AUTO: 37.9 % (ref 35–47)
HGB BLD-MCNC: 12.5 G/DL (ref 11.7–15.7)
LEVETIRACETAM SERPL-MCNC: 8.2 ΜG/ML (ref 10–40)
MCH RBC QN AUTO: 31.5 PG (ref 26.5–33)
MCHC RBC AUTO-ENTMCNC: 33 G/DL (ref 31.5–36.5)
MCV RBC AUTO: 96 FL (ref 78–100)
PLATELET # BLD AUTO: 230 10E3/UL (ref 150–450)
POTASSIUM SERPL-SCNC: 4.2 MMOL/L (ref 3.4–5.3)
PROT SERPL-MCNC: 7.1 G/DL (ref 6.4–8.3)
RBC # BLD AUTO: 3.97 10E6/UL (ref 3.8–5.2)
SODIUM SERPL-SCNC: 139 MMOL/L (ref 135–145)
TSH SERPL DL<=0.005 MIU/L-ACNC: 2.15 UIU/ML (ref 0.3–4.2)
VIT D+METAB SERPL-MCNC: 33 NG/ML (ref 20–50)
WBC # BLD AUTO: 4.2 10E3/UL (ref 4–11)

## 2024-09-18 PROCEDURE — 85027 COMPLETE CBC AUTOMATED: CPT

## 2024-09-18 PROCEDURE — 84443 ASSAY THYROID STIM HORMONE: CPT

## 2024-09-18 PROCEDURE — 80177 DRUG SCRN QUAN LEVETIRACETAM: CPT

## 2024-09-18 PROCEDURE — 82533 TOTAL CORTISOL: CPT

## 2024-09-18 PROCEDURE — 80053 COMPREHEN METABOLIC PANEL: CPT

## 2024-09-18 PROCEDURE — 36415 COLL VENOUS BLD VENIPUNCTURE: CPT

## 2024-09-18 PROCEDURE — 82306 VITAMIN D 25 HYDROXY: CPT

## 2024-09-19 NOTE — TELEPHONE ENCOUNTER
Was able to reach pt 9/13/24 and spoke with pt.  Walking distances that then do not qualify her for handicap parking permit. Has some general fatigue. Sleeping OK. Lives alone but no known snoring. Lab orders placed fro metabolic causes for fatigue

## 2024-12-03 ENCOUNTER — MYC MEDICAL ADVICE (OUTPATIENT)
Dept: INTERNAL MEDICINE | Facility: CLINIC | Age: 84
End: 2024-12-03
Payer: COMMERCIAL

## 2024-12-03 DIAGNOSIS — E78.5 HYPERLIPIDEMIA LDL GOAL <100: ICD-10-CM

## 2024-12-03 DIAGNOSIS — R73.9 BLOOD GLUCOSE ELEVATED: Primary | ICD-10-CM

## 2024-12-03 RX ORDER — PRAVASTATIN SODIUM 80 MG/1
TABLET ORAL
Qty: 90 TABLET | Refills: 3 | Status: SHIPPED | OUTPATIENT
Start: 2024-12-03

## 2024-12-03 NOTE — TELEPHONE ENCOUNTER
Patient is overdue to fill pravastatin. Per our records, last filled 6/25/24 for 90 day supply and is 51 days late to fill. Current prescription out of refills.      New prescription needed given insufficient refills remaining so pended for prescriber review and signature. Upcoming appointment 1/8/25.       Thank you!    María Silva, PharmD, [facility]  Population Health Pharmacist  [phone]

## 2024-12-04 NOTE — TELEPHONE ENCOUNTER
Noted.  Prescription refilled.  [email or web address] message sent to patient requesting follow-up fasting labs in a month prior to seeing me back in clinic in January

## 2025-01-03 SDOH — HEALTH STABILITY: PHYSICAL HEALTH: ON AVERAGE, HOW MANY DAYS PER WEEK DO YOU ENGAGE IN MODERATE TO STRENUOUS EXERCISE (LIKE A BRISK WALK)?: 2 DAYS

## 2025-01-03 SDOH — HEALTH STABILITY: PHYSICAL HEALTH: ON AVERAGE, HOW MANY MINUTES DO YOU ENGAGE IN EXERCISE AT THIS LEVEL?: 40 MIN

## 2025-01-03 ASSESSMENT — SOCIAL DETERMINANTS OF HEALTH (SDOH): HOW OFTEN DO YOU GET TOGETHER WITH FRIENDS OR RELATIVES?: TWICE A WEEK

## 2025-01-08 ENCOUNTER — PATIENT OUTREACH (OUTPATIENT)
Dept: CARE COORDINATION | Facility: CLINIC | Age: 85
End: 2025-01-08

## 2025-01-08 ENCOUNTER — OFFICE VISIT (OUTPATIENT)
Dept: INTERNAL MEDICINE | Facility: CLINIC | Age: 85
End: 2025-01-08
Payer: COMMERCIAL

## 2025-01-08 VITALS
SYSTOLIC BLOOD PRESSURE: 139 MMHG | WEIGHT: 154.8 LBS | HEIGHT: 67 IN | OXYGEN SATURATION: 99 % | BODY MASS INDEX: 24.3 KG/M2 | HEART RATE: 59 BPM | TEMPERATURE: 98.2 F | DIASTOLIC BLOOD PRESSURE: 80 MMHG

## 2025-01-08 DIAGNOSIS — R73.9 BLOOD GLUCOSE ELEVATED: ICD-10-CM

## 2025-01-08 DIAGNOSIS — G89.29 CHRONIC PAIN OF RIGHT KNEE: ICD-10-CM

## 2025-01-08 DIAGNOSIS — M85.80 OSTEOPENIA, UNSPECIFIED LOCATION: ICD-10-CM

## 2025-01-08 DIAGNOSIS — Z12.31 ENCOUNTER FOR SCREENING MAMMOGRAM FOR BREAST CANCER: ICD-10-CM

## 2025-01-08 DIAGNOSIS — E78.5 HYPERLIPIDEMIA LDL GOAL <100: ICD-10-CM

## 2025-01-08 DIAGNOSIS — M25.561 CHRONIC PAIN OF RIGHT KNEE: ICD-10-CM

## 2025-01-08 DIAGNOSIS — Z00.00 MEDICARE ANNUAL WELLNESS VISIT, SUBSEQUENT: Primary | ICD-10-CM

## 2025-01-08 DIAGNOSIS — G40.909 SEIZURE DISORDER (H): ICD-10-CM

## 2025-01-08 DIAGNOSIS — F41.9 ANXIETY: ICD-10-CM

## 2025-01-08 LAB
ALBUMIN SERPL BCG-MCNC: 4.2 G/DL (ref 3.5–5.2)
ALP SERPL-CCNC: 64 U/L (ref 40–150)
ALT SERPL W P-5'-P-CCNC: 10 U/L (ref 0–50)
ANION GAP SERPL CALCULATED.3IONS-SCNC: 10 MMOL/L (ref 7–15)
AST SERPL W P-5'-P-CCNC: 18 U/L (ref 0–45)
BILIRUB SERPL-MCNC: 0.4 MG/DL
BUN SERPL-MCNC: 10.8 MG/DL (ref 8–23)
CALCIUM SERPL-MCNC: 9.5 MG/DL (ref 8.8–10.4)
CHLORIDE SERPL-SCNC: 104 MMOL/L (ref 98–107)
CHOLEST SERPL-MCNC: 209 MG/DL
CREAT SERPL-MCNC: 0.77 MG/DL (ref 0.51–0.95)
EGFRCR SERPLBLD CKD-EPI 2021: 76 ML/MIN/1.73M2
EST. AVERAGE GLUCOSE BLD GHB EST-MCNC: 111 MG/DL
FASTING STATUS PATIENT QL REPORTED: NO
FASTING STATUS PATIENT QL REPORTED: NO
GLUCOSE SERPL-MCNC: 91 MG/DL (ref 70–99)
HBA1C MFR BLD: 5.5 % (ref 0–5.6)
HCO3 SERPL-SCNC: 27 MMOL/L (ref 22–29)
HDLC SERPL-MCNC: 53 MG/DL
LDLC SERPL CALC-MCNC: 133 MG/DL
NONHDLC SERPL-MCNC: 156 MG/DL
POTASSIUM SERPL-SCNC: 4.3 MMOL/L (ref 3.4–5.3)
PROT SERPL-MCNC: 7.5 G/DL (ref 6.4–8.3)
SODIUM SERPL-SCNC: 141 MMOL/L (ref 135–145)
TRIGL SERPL-MCNC: 117 MG/DL

## 2025-01-08 PROCEDURE — 36415 COLL VENOUS BLD VENIPUNCTURE: CPT | Performed by: INTERNAL MEDICINE

## 2025-01-08 PROCEDURE — 83036 HEMOGLOBIN GLYCOSYLATED A1C: CPT | Performed by: INTERNAL MEDICINE

## 2025-01-08 PROCEDURE — 80061 LIPID PANEL: CPT | Performed by: INTERNAL MEDICINE

## 2025-01-08 PROCEDURE — 80053 COMPREHEN METABOLIC PANEL: CPT | Performed by: INTERNAL MEDICINE

## 2025-01-08 RX ORDER — LORAZEPAM 0.5 MG/1
0.5 TABLET ORAL DAILY PRN
Qty: 30 TABLET | Refills: 0 | Status: CANCELLED | OUTPATIENT
Start: 2025-01-08

## 2025-01-08 RX ORDER — PRAVASTATIN SODIUM 80 MG/1
TABLET ORAL
Qty: 90 TABLET | Refills: 3 | Status: SHIPPED | OUTPATIENT
Start: 2025-01-08

## 2025-01-08 ASSESSMENT — PATIENT HEALTH QUESTIONNAIRE - PHQ9
SUM OF ALL RESPONSES TO PHQ QUESTIONS 1-9: 7
SUM OF ALL RESPONSES TO PHQ QUESTIONS 1-9: 7
10. IF YOU CHECKED OFF ANY PROBLEMS, HOW DIFFICULT HAVE THESE PROBLEMS MADE IT FOR YOU TO DO YOUR WORK, TAKE CARE OF THINGS AT HOME, OR GET ALONG WITH OTHER PEOPLE: NOT DIFFICULT AT ALL

## 2025-01-08 NOTE — PROGRESS NOTES
Preventive Care Visit  [facility]  Dariel Weber MD, Internal Medicine  Jan 8, 2025       ASSESSMENT:    1. Medicare annual wellness visit, subsequent (Primary)  Patient declines COVID and influenza vaccination.  See plan discussion below regarding healthcare maintenance recommendations.  Otherwise up-to-date for age    2. Anxiety  Controlled.  Very rare use of lorazepam.  Patient still has medication leftover from #30 tab prescription from January 2023    3. Hyperlipidemia LDL goal <100  On statin therapy.  Previous LDL mildly elevated.  Needs lab follow-up.  Continue current medication pending lab results  - pravastatin (PRAVACHOL) 80 MG tablet; Take 1 tablet by mouth. at bedtime.  Dispense: 90 tablet; Refill: 3  - Comprehensive metabolic panel  - Lipid panel reflex to direct LDL Fasting    4. Chronic pain of right knee  Chronic stable knee pain.  Continue management per [facility].  Patient declines knee replacement at this time.  No history of fall and not requiring assistance with ambulation    5. Blood glucose elevated  Prior fasting glucose 108.  Has lost weight since that time.  Lab follow-up as ordered  - Hemoglobin A1c  - Comprehensive metabolic panel    6. Seizure disorder (H)  Denies seizure activity in the last year with medication therapy.  Continue Keppra per neurology    7. Encounter for screening mammogram for breast cancer  Candidate for breast cancer screening.  Asymptomatic  - MA Screen Bilateral w/[place]; Future    8. Osteopenia, unspecified location  DEXA 2023 with 5.4% risk hip fracture.  Patient declines prescription therapy at this time      PLAN:  Continue current medications  Prescriptions refilled at your pharmacy.    Labs today as ordered  Mammogram. This will be done at the  [facility]. Call [phone] or use [employer] to schedule.   I would recommend  a  RSV vaccine (respiratory syncytial virus risk reduction). Get at a pharmacy  Follow up in 1 year.. Earlier as  "needed  Because non-acute appointments to see me in clinic are currently booking out about 3 months at the clinic (for multiple reasons), please use [employer] or call the appointment line now to schedule the future appointment so that it is \"on the books\".    Schedule an eye exam appointment. Please ask the eye clinic to fax us a report of your eye exam to [phone], attention Dr Weber  Follow-up with [facility] and neurology per timing of  their previous recommendation instructions to you  Pt was informed regarding extra E&M billing for management of new or established medical issues not related to today's wellness/screening visit           Parker Gaines is a 84 year old, presenting for the following:  Wellness Visit  And follow-up regarding medical issues as above    HPI     Health Care Directive  Patient has a Health Care Directive on file  Advance care planning document is on file and is current.        1/3/2025   General Health   How would you rate your overall physical health? (!) FAIR   Feel stress (tense, anxious, or unable to sleep) Only a little   (!) STRESS CONCERN      1/3/2025   Nutrition   Diet: Regular (no restrictions)         1/3/2025   Exercise   Days per week of moderate/strenous exercise 2 days   Average minutes spent exercising at this level 40 min   (!) EXERCISE CONCERN      1/3/2025   Social Factors   Frequency of gathering with friends or relatives Twice a week   Worry food won't last until get money to buy more No   Food not last or not have enough money for food? No   Do you have housing? (Housing is defined as stable permanent housing and does not include staying ouside in a car, in a tent, in an abandoned building, in an overnight shelter, or couch-surfing.) Yes   Are you worried about losing your housing? No   Lack of transportation? No   Unable to get utilities (heat,electricity)? No         1/8/2025   Fall Risk   Fallen 2 or more times in the past year? No     No   Trouble " with walking or balance? Yes     Yes   Gait Speed Test (Document in seconds) 6.9   Gait Speed Test Interpretation Greater than 5.01 seconds - ABNORMAL       Proxy-reported    Multiple values from one day are sorted in reverse-chronological order          1/3/2025   Activities of Daily Living- Home Safety   Needs help with the following daily activites None of the above   Safety concerns in the home None of the above         1/3/2025   Dental   Dentist two times every year? Yes         1/3/2025   Hearing Screening   Hearing concerns? (!) TROUBLE UNDERSTANDING SOFT OR WHISPERED SPEECH.         1/3/2025   Driving Risk Screening   Patient/family members have concerns about driving No         1/3/2025   General Alertness/Fatigue Screening   Have you been more tired than usual lately? (!) YES         1/3/2025   Urinary Incontinence Screening   Bothered by leaking urine in past 6 months Yes         1/3/2025   TB Screening   Were you born outside of the [place]? No       Today's PHQ-9 Score:       1/8/2025     1:37 PM   PHQ-9 SCORE   PHQ-9 Total Score MyChart 7 (Mild depression)   PHQ-9 Total Score 7        Patient-reported         1/3/2025   Substance Use   Alcohol more than 3/day or more than 7/wk No   Do you have a current opioid prescription? No   How severe/bad is pain from 1 to 10? 6/10   Do you use any other substances recreationally? (!) AMPHETAMINES     Social History     Tobacco Use    Smoking status: Never    Smokeless tobacco: Never   Vaping Use    Vaping status: Never Used   Substance Use Topics    Alcohol use: Yes     Comment: occ    Drug use: No            11/21/2023   LAST FHS-7 RESULTS   1st degree relative breast or ovarian cancer Yes   Any relative bilateral breast cancer Yes      Mammogram Screening - After age 74- determine frequency with patient based on health status, life expectancy and patient goals         Current providers sharing in care for this patient include:  Patient Care Team:  Dariel Weber MD  "as PCP - General  Dariel Weber MD as Assigned PCP  Ede Orona MD as MD (OB/Gyn)    The following health maintenance items are reviewed in Epic and correct as of today:  Health Maintenance   Topic Date Due    ANNUAL REVIEW OF HM ORDERS  Never done    RSV VACCINE (1 - 1-dose 75+ series) Never done    MEDICARE ANNUAL WELLNESS VISIT  03/17/2024    COVID-19 Vaccine (5 - 2024-25 season) 09/01/2024    LIPID  07/15/2025    FALL RISK ASSESSMENT  01/08/2026    DTAP/TDAP/TD IMMUNIZATION (2 - Td or Tdap) 03/20/2027    ADVANCE CARE PLANNING  03/18/2028    DEXA  03/23/2038    PHQ-2 (once per calendar year)  Completed    Pneumococcal Vaccine: 50+ Years  Completed    ZOSTER IMMUNIZATION  Completed    HPV IMMUNIZATION  Aged Out    MENINGITIS IMMUNIZATION  Aged Out    RSV MONOCLONAL ANTIBODY  Aged Out    INFLUENZA VACCINE  Discontinued         Review of Systems  CONSTITUTIONAL: NEGATIVE for fever, chills, Weight down 6 pounds  INTEGUMENTARY/SKIN: NEGATIVE for worrisome rashes, moles or lesions  EYES: NEGATIVE for vision changes or irritation  ENT/MOUTH:  POSITIVE for URI 2 weeks with nasal drainage and occ clear cough. Getting  better  RESP: NEGATIVE fo SOB  BREAST: NEGATIVE for masses, tenderness or discharge  CV: NEGATIVE for chest pain, palpitations or peripheral edema  GI: NEGATIVE for nausea, abdominal pain, heartburn, or change in bowel habits  : NEGATIVE for frequency, dysuria, or hematuria  MUSCULOSKELETAL:  POSITIVE for occ right knee pain. Still walking. Has seen [facility] in past. Some  stiffness in hands in AM only   NEURO: NEGATIVE for weakness, dizziness or paresthesias  ENDOCRINE: NEGATIVE for temperature intolerance.  On statin. Due for follow-up  lipids  HEME: NEGATIVE for bleeding problems  PSYCHIATRIC: NEGATIVE for changes in mood or affect overall. Has been volunteering \"too  much\" with some stress but will be doing less     Objective    Exam  /80   Pulse 59   Temp 98.2  F (36.8  C) (Temporal)   Ht " "1.702 m (5' 7\")   Wt 70.2 kg (154 lb 12.8 oz)   LMP  (LMP Unknown)   SpO2 99%   BMI 24.25 kg/m     Estimated body mass index is 24.25 kg/m  as calculated from the following:    Height as of this encounter: 1.702 m (5' 7\").    Weight as of this encounter: 70.2 kg (154 lb 12.8 oz).    Physical Exam  General appearance - healthy, alert, no distress  Skin - No rashes or lesions.  Head - normocephalic, atraumatic  Eyes - RAFI, EOMI, fundi exam with nondilated pupils negative.  Ears - External ears normal. Canals clear. TM's normal.  Nose/Sinuses - Nares normal. Septum midline. Mucosa normal. No drainage or sinus tenderness.  Oropharynx - No erythema, no adenopathy, no exudates.  Neck - Supple without adenopathy or thyromegaly. No bruits.  Lungs - Clear to auscultation without wheezes/rhonchi.  Heart - Regular rate and rhythm without murmurs, clicks, or gallops.  Nodes - No supraclavicular, axillary, or inguinal adenopathy palpable.  Breasts - deferred  Abdomen - Abdomen soft, non-tender. BS normal. No masses or hepatosplenomegaly palpable. No bruits.  Extremities -No cyanosis, clubbing or edema.    Musculoskeletal - Spine ROM normal. Muscular strength intact.  Mild tenderness palpation right knee joint line.  No effusion  Peripheral pulses - radial=4/4, femoral=4/4, posterior tibial=4/4, dorsalis pedis=4/4,  Neuro - Gait  slower and mildly antalgic but stable.  Reflexes normal and symmetric. Sensation grossly WNL.  Genital/Rectal - deferred           1/8/2025   Mini Cog   Mini-Cog Not Completed (choose reason) Patient declines             Signed Electronically by: Dariel Weber MD     "

## 2025-01-08 NOTE — PATIENT INSTRUCTIONS
"Continue current medications  Prescriptions refilled at your pharmacy.    Labs today as ordered  Mammogram. This will be done at the  [facility]. Call [phone] or use [employer] to schedule.   I would recommend  a  RSV vaccine (respiratory syncytial virus risk reduction). Get at a pharmacy  Follow up in 1 year.. Earlier as needed  Because non-acute appointments to see me in clinic are currently booking out about 3 months at the clinic (for multiple reasons), please use [employer] or call the appointment line now to schedule the future appointment so that it is \"on the books\".    Schedule an eye exam appointment. Please ask the eye clinic to fax us a report of your eye exam to [phone], attention Dr Weber  Follow-up with [facility] and neurology per timing of  their previous recommendation instructions to you  Pt was informed regarding extra E&M billing for management of new or established medical issues not related to today's wellness/screening visit    "

## 2025-01-09 PROBLEM — G40.909 SEIZURE DISORDER (H): Status: ACTIVE | Noted: 2025-01-09

## 2025-01-09 PROBLEM — M25.561 CHRONIC PAIN OF RIGHT KNEE: Status: ACTIVE | Noted: 2025-01-09

## 2025-01-09 PROBLEM — G89.29 CHRONIC PAIN OF RIGHT KNEE: Status: ACTIVE | Noted: 2025-01-09

## 2025-01-09 PROBLEM — I60.9 SUBARACHNOID HEMORRHAGE (H): Status: RESOLVED | Noted: 2023-05-31 | Resolved: 2025-01-09

## 2025-01-09 PROBLEM — I63.9 ACUTE CVA (CEREBROVASCULAR ACCIDENT) (H): Status: RESOLVED | Noted: 2023-05-31 | Resolved: 2025-01-09

## 2025-01-09 PROBLEM — L65.9 HAIR LOSS: Status: RESOLVED | Noted: 2020-10-25 | Resolved: 2025-01-09

## 2025-04-23 ENCOUNTER — TRANSFERRED RECORDS (OUTPATIENT)
Dept: HEALTH INFORMATION MANAGEMENT | Facility: CLINIC | Age: 85
End: 2025-04-23
Payer: COMMERCIAL

## 2025-05-21 ENCOUNTER — MYC MEDICAL ADVICE (OUTPATIENT)
Dept: INTERNAL MEDICINE | Facility: CLINIC | Age: 85
End: 2025-05-21
Payer: COMMERCIAL

## 2025-07-28 ENCOUNTER — OFFICE VISIT (OUTPATIENT)
Dept: URGENT CARE | Facility: URGENT CARE | Age: 85
End: 2025-07-28
Payer: COMMERCIAL

## 2025-07-28 VITALS
WEIGHT: 157 LBS | TEMPERATURE: 97.4 F | BODY MASS INDEX: 24.64 KG/M2 | HEIGHT: 67 IN | SYSTOLIC BLOOD PRESSURE: 117 MMHG | DIASTOLIC BLOOD PRESSURE: 74 MMHG | RESPIRATION RATE: 18 BRPM | OXYGEN SATURATION: 96 % | HEART RATE: 71 BPM

## 2025-07-28 DIAGNOSIS — N30.00 ACUTE CYSTITIS WITHOUT HEMATURIA: Primary | ICD-10-CM

## 2025-07-28 DIAGNOSIS — R35.0 URINARY FREQUENCY: ICD-10-CM

## 2025-07-28 LAB
ALBUMIN UR-MCNC: NEGATIVE MG/DL
APPEARANCE UR: CLEAR
BACTERIA #/AREA URNS HPF: ABNORMAL /HPF
BILIRUB UR QL STRIP: NEGATIVE
COLOR UR AUTO: YELLOW
GLUCOSE UR STRIP-MCNC: NEGATIVE MG/DL
HGB UR QL STRIP: ABNORMAL
KETONES UR STRIP-MCNC: NEGATIVE MG/DL
LEUKOCYTE ESTERASE UR QL STRIP: ABNORMAL
NITRATE UR QL: NEGATIVE
PH UR STRIP: 6 [PH] (ref 5–7)
RBC #/AREA URNS AUTO: ABNORMAL /HPF
SP GR UR STRIP: 1.01 (ref 1–1.03)
SQUAMOUS #/AREA URNS AUTO: ABNORMAL /LPF
UROBILINOGEN UR STRIP-ACNC: 0.2 E.U./DL
WBC #/AREA URNS AUTO: ABNORMAL /HPF

## 2025-07-28 PROCEDURE — 81001 URINALYSIS AUTO W/SCOPE: CPT | Performed by: INTERNAL MEDICINE

## 2025-07-28 PROCEDURE — 99213 OFFICE O/P EST LOW 20 MIN: CPT | Performed by: INTERNAL MEDICINE

## 2025-07-28 PROCEDURE — 3078F DIAST BP <80 MM HG: CPT | Performed by: INTERNAL MEDICINE

## 2025-07-28 PROCEDURE — 3074F SYST BP LT 130 MM HG: CPT | Performed by: INTERNAL MEDICINE

## 2025-07-28 PROCEDURE — 87088 URINE BACTERIA CULTURE: CPT | Performed by: INTERNAL MEDICINE

## 2025-07-28 PROCEDURE — 87086 URINE CULTURE/COLONY COUNT: CPT | Performed by: INTERNAL MEDICINE

## 2025-07-28 RX ORDER — NITROFURANTOIN 25; 75 MG/1; MG/1
100 CAPSULE ORAL 2 TIMES DAILY
Qty: 10 CAPSULE | Refills: 0 | Status: SHIPPED | OUTPATIENT
Start: 2025-07-28 | End: 2025-08-02

## 2025-07-28 NOTE — PROGRESS NOTES
"ASSESSMENT AND PLAN:      ICD-10-CM    1. Acute cystitis without hematuria  N30.00 nitroFURantoin macrocrystal-monohydrate (MACROBID) 100 MG capsule      2. Urinary frequency  R35.0 UA with Microscopic reflex to Culture     UA Microscopic with Reflex to Culture     Urine Culture Aerobic Bacterial - lab collect            PLAN:      Patient Instructions       Urine test - possibly early bladder infection  Due to symptoms, treatment with antibiotics    Macrobid 2 x day for 5 days  Yogurt.  Probiotic      Call or return to clinic if symptoms worsen or fail to improve as anticipated.        Tere Montiel MD  [facility]    Subjective     Liana Giraldo is a 85 year old who presents for Patient presents with:  Urinary Problem: C/o urinary frequency x2 days    an established patient of [facility].    UTI    Onset of symptoms was 2day(s).  Current and associated symptoms frequency and urgency  Treatment and measures tried None  Predisposing factors include none  Patient denies flank pain and temperature > 101 degrees F.        Review of Systems        Objective    /74 (BP Location: Right arm, Patient Position: Sitting, Cuff Size: Adult Regular)   Pulse 71   Temp 97.4  F (36.3  C) (Tympanic)   Resp 18   Ht 1.702 m (5' 7\")   Wt 71.2 kg (157 lb)   LMP  (LMP Unknown)   SpO2 96%   BMI 24.59 kg/m    Physical Exam  Vitals reviewed.   Constitutional:       Appearance: Normal appearance.   Abdominal:      Palpations: Abdomen is soft.      Tenderness: There is no abdominal tenderness. There is no right CVA tenderness or left CVA tenderness.   Neurological:      Mental Status: She is alert.          Recent Results (from the past 24 hours)   UA with Microscopic reflex to Culture    Specimen: Urine, Clean Catch   Result Value Ref Range    Color Urine Yellow Colorless, Straw, Light Yellow, Yellow    Appearance Urine Clear Clear    Glucose Urine Negative Negative mg/dL    Bilirubin Urine Negative " Negative    Ketones Urine Negative Negative mg/dL    Specific Gravity Urine 1.010 1.003 - 1.035    Blood Urine Trace (A) Negative    pH Urine 6.0 5.0 - 7.0    Protein Albumin Urine Negative Negative mg/dL    Urobilinogen Urine 0.2 0.2, 1.0 E.U./dL    Nitrite Urine Negative Negative    Leukocyte Esterase Urine Small (A) Negative   UA Microscopic with Reflex to Culture   Result Value Ref Range    Bacteria Urine Few (A) None Seen /HPF    RBC Urine 0-2 0-2 /HPF /HPF    WBC Urine 5-10 (A) 0-5 /HPF /HPF    Squamous Epithelials Urine Few (A) None Seen /LPF    Narrative    Urine Culture not indicated

## 2025-07-28 NOTE — PROGRESS NOTES
Urgent Care Clinic Visit    Chief Complaint   Patient presents with    Urinary Problem     C/o urinary frequency x2 days               7/28/2025    12:50 PM   Additional Questions   Roomed by Mary Carmen     Pre-Provider Visit Orders- Urinalysis UA/UC  Patient reports the following symptoms:  frequent urination   Does the patient report any of the following symptoms: vaginal discharge, vaginal itching, possible yeast infection, has a urinary catheter in place, or unable to void in a specimen cup?  No

## 2025-07-28 NOTE — PATIENT INSTRUCTIONS
Urine test - possibly early bladder infection  Due to symptoms, treatment with antibiotics    Macrobid 2 x day for 5 days  Yogurt.  Probiotic      Call or return to clinic if symptoms worsen or fail to improve as anticipated.

## 2025-07-29 LAB — BACTERIA UR CULT: ABNORMAL
